# Patient Record
Sex: FEMALE | Race: OTHER | NOT HISPANIC OR LATINO | Employment: OTHER | URBAN - METROPOLITAN AREA
[De-identification: names, ages, dates, MRNs, and addresses within clinical notes are randomized per-mention and may not be internally consistent; named-entity substitution may affect disease eponyms.]

---

## 2022-07-18 ENCOUNTER — OFFICE VISIT (OUTPATIENT)
Dept: URGENT CARE | Facility: CLINIC | Age: 67
End: 2022-07-18
Payer: MEDICARE

## 2022-07-18 VITALS
HEIGHT: 64 IN | SYSTOLIC BLOOD PRESSURE: 142 MMHG | BODY MASS INDEX: 21.34 KG/M2 | HEART RATE: 101 BPM | OXYGEN SATURATION: 98 % | WEIGHT: 125 LBS | RESPIRATION RATE: 18 BRPM | DIASTOLIC BLOOD PRESSURE: 90 MMHG | TEMPERATURE: 99 F

## 2022-07-18 DIAGNOSIS — M25.572 ACUTE LEFT ANKLE PAIN: ICD-10-CM

## 2022-07-18 DIAGNOSIS — M79.672 LEFT FOOT PAIN: Primary | ICD-10-CM

## 2022-07-18 PROCEDURE — 73630 XR FOOT COMPLETE 3 VIEW LEFT: ICD-10-PCS | Mod: LT,S$GLB,, | Performed by: RADIOLOGY

## 2022-07-18 PROCEDURE — 73610 X-RAY EXAM OF ANKLE: CPT | Mod: LT,S$GLB,, | Performed by: RADIOLOGY

## 2022-07-18 PROCEDURE — 73610 XR ANKLE COMPLETE 3 VIEW LEFT: ICD-10-PCS | Mod: LT,S$GLB,, | Performed by: RADIOLOGY

## 2022-07-18 PROCEDURE — 99204 PR OFFICE/OUTPT VISIT, NEW, LEVL IV, 45-59 MIN: ICD-10-PCS | Mod: S$GLB,,, | Performed by: NURSE PRACTITIONER

## 2022-07-18 PROCEDURE — 73630 X-RAY EXAM OF FOOT: CPT | Mod: LT,S$GLB,, | Performed by: RADIOLOGY

## 2022-07-18 PROCEDURE — 99204 OFFICE O/P NEW MOD 45 MIN: CPT | Mod: S$GLB,,, | Performed by: NURSE PRACTITIONER

## 2022-07-18 NOTE — PROGRESS NOTES
"Subjective:       Patient ID: Radha Vu is a 67 y.o. female.    Vitals:  height is 5' 4" (1.626 m) and weight is 56.7 kg (125 lb). Her oral temperature is 98.9 °F (37.2 °C). Her blood pressure is 142/90 (abnormal) and her pulse is 101. Her respiration is 18 and oxygen saturation is 98%.     Chief Complaint: Foot Injury    Pt stomped left foot down on Saturday, she instantly felt sharp , hot, shooting pain. Left ankle is swollen and bruising is present under foot. Pain radiates up the front of shin just below the knee. Pt's  states she has dementia.    Foot Injury   The incident occurred 2 days ago. The incident occurred at home. The injury mechanism was a direct blow. The pain is present in the left ankle and left foot. The quality of the pain is described as burning and shooting. The pain is at a severity of 8/10. Associated symptoms include an inability to bear weight. She has tried ice for the symptoms.       Musculoskeletal: Positive for pain.         Objective:      Physical Exam   HENT:   Head: Normocephalic and atraumatic.   Ears:   Right Ear: External ear normal.   Left Ear: External ear normal.   Pulmonary/Chest: Effort normal. No respiratory distress.   Abdominal: Normal appearance.   Musculoskeletal:         General: Swelling, tenderness and signs of injury present.      Right ankle: Normal. Achilles tendon normal.      Left ankle: She exhibits decreased range of motion. Tenderness. Lateral malleolus and medial malleolus tenderness found.      Comments: Plantar bruising and tenderness    Neurological: She is alert.   Skin: Skin is warm.   Nursing note and vitals reviewed.      XR ANKLE COMPLETE 3 VIEW LEFT    Result Date: 7/18/2022  EXAMINATION: XR ANKLE COMPLETE 3 VIEW LEFT CLINICAL HISTORY: Pain in left ankle and joints of left foot TECHNIQUE: AP, lateral and oblique views of the left ankle were performed. COMPARISON: None FINDINGS: An acute fracture of the tibia, fibula or talus is not " seen.  Soft tissue swelling is not noted.  The ankle mortise is intact.  Mild bony irregularity is noted at the inferior tip of the left lateral malleolus.  This may be congenital, an accessory ossicle, or secondary to prior avulsion fracture or degenerative changes.     Osseous structure inferior to the lateral malleolus may be and accessory ossicle, congenital or secondary to degenerative changes or possible a prior avulsion fracture.  An acute fracture or soft tissue swelling is not seen. Electronically signed by: Miles Hong MD Date:    07/18/2022 Time:    16:42    XR FOOT COMPLETE 3 VIEW LEFT    Result Date: 7/18/2022  EXAMINATION: XR FOOT COMPLETE 3 VIEW LEFT CLINICAL HISTORY: .  Pain in left foot TECHNIQUE: AP, lateral and oblique views of the left foot were performed. COMPARISON: None FINDINGS: There is bunion formation with metatarsus varus and hallux valgus.  A fracture is not identified.  Soft tissue swelling or foreign bodies are not seen.     Bunion formation with metatarsus varus and hallux valgus. Electronically signed by: Miles Hong MD Date:    07/18/2022 Time:    16:40  Assessment:       1. Left foot pain    2. Acute left ankle pain          Plan:     Posterior short leg with Stirrup    applied to left lower extremity. Pulses, circulation and sensation intact.  Extremity pink and warm  Pt referred to Podiatry and splinted until they can rule out LiscFranc injury or other ligament/tendon injury . Xray WNL.    Left foot pain  -     XR FOOT COMPLETE 3 VIEW LEFT; Future; Expected date: 07/18/2022  -     Splint application; Future  -     Ambulatory referral/consult to Podiatry    Acute left ankle pain  -     XR ANKLE COMPLETE 3 VIEW LEFT; Future; Expected date: 07/18/2022                 Patient Instructions                                Orthopedic Follow up Discharge Instructions    If your condition worsens or fails to improve we recommend that you receive another evaluation at the ER  immediately or contact your PCP to discuss your concerns or return here. You must understand that you've received an urgent care treatment only and that you may be released before all your medical problems are known or treated. You the patient will arrange for follouwp care as instructed.   If you were prescribed a narcotic or muscle relaxant do not drive or operate heavy machinery while taking these medications   Tylenol or ibuprofen can also be used as directed for pain unless you have an allergy to them or medical condition such as stomach ulcers, kidney or liver disease or blood thinners etc for which you should not be taking these type of medications.   If you were given a prescription NSAID here do not also take any over the counter NSAID like ibuprofen, aleve, advil, motrin etc   RICE which means rest, ice compression and elevation are helpful.   If you have Low Back Pain and develop bowel or bladder symptoms or increase pain going down your legs go to the ER immediately.   If you were given a splint wear it at all times.   If you were given crutches use them as we instructed. Do not rest your armpits on the foam pad or you risk compressing the nerves and the vessels there   Follow up with the orthopedist in 1 week if you continue with pain.   Sometimes it can take up to 1 week for stress fractures to show up on an X-ray, and may need reimaging or a CT or MRI of the affected area.

## 2022-07-20 ENCOUNTER — OFFICE VISIT (OUTPATIENT)
Dept: PODIATRY | Facility: CLINIC | Age: 67
End: 2022-07-20
Payer: MEDICARE

## 2022-07-20 VITALS — OXYGEN SATURATION: 98 % | HEART RATE: 91 BPM | HEIGHT: 64 IN | WEIGHT: 125 LBS | BODY MASS INDEX: 21.34 KG/M2

## 2022-07-20 DIAGNOSIS — M25.572 ACUTE LEFT ANKLE PAIN: ICD-10-CM

## 2022-07-20 DIAGNOSIS — S99.912A INJURY OF LEFT ANKLE, INITIAL ENCOUNTER: ICD-10-CM

## 2022-07-20 DIAGNOSIS — M25.472 LEFT ANKLE SWELLING: ICD-10-CM

## 2022-07-20 DIAGNOSIS — S96.912A TENDON TEAR, ANKLE, LEFT, INITIAL ENCOUNTER: Primary | ICD-10-CM

## 2022-07-20 DIAGNOSIS — M79.672 LEFT FOOT PAIN: ICD-10-CM

## 2022-07-20 DIAGNOSIS — T14.8XXA LIGAMENT TEAR: ICD-10-CM

## 2022-07-20 DIAGNOSIS — S93.402A SPRAIN OF LEFT ANKLE, UNSPECIFIED LIGAMENT, INITIAL ENCOUNTER: ICD-10-CM

## 2022-07-20 PROCEDURE — 99203 PR OFFICE/OUTPT VISIT, NEW, LEVL III, 30-44 MIN: ICD-10-PCS | Mod: S$GLB,,, | Performed by: PODIATRIST

## 2022-07-20 PROCEDURE — 99203 OFFICE O/P NEW LOW 30 MIN: CPT | Mod: S$GLB,,, | Performed by: PODIATRIST
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
## 2022-07-31 ENCOUNTER — HOSPITAL ENCOUNTER (OUTPATIENT)
Dept: RADIOLOGY | Facility: HOSPITAL | Age: 67
Discharge: HOME | End: 2022-07-31
Attending: PODIATRIST
Payer: MEDICARE

## 2022-07-31 DIAGNOSIS — S96.912A TENDON TEAR, ANKLE, LEFT, INITIAL ENCOUNTER: ICD-10-CM

## 2022-07-31 DIAGNOSIS — T14.8XXA LIGAMENT TEAR: ICD-10-CM

## 2022-07-31 PROCEDURE — 73721 MRI JNT OF LWR EXTRE W/O DYE: CPT | Mod: TC,LT

## 2022-07-31 NOTE — PROGRESS NOTES
MRI of the Left Ankle was done. Patient injured ankle several months ago. Increasing pain and limited range of motion. Hurts to bear weight on it.

## 2022-08-02 DIAGNOSIS — S92.045A OTHER CLOSED NONDISPLACED FRACTURE OF TUBEROSITY OF LEFT CALCANEUS, INITIAL ENCOUNTER: Primary | ICD-10-CM

## 2022-08-03 ENCOUNTER — OFFICE VISIT (OUTPATIENT)
Dept: ORTHOPEDICS | Facility: CLINIC | Age: 67
End: 2022-08-03
Payer: MEDICARE

## 2022-08-03 ENCOUNTER — TELEPHONE (OUTPATIENT)
Dept: ORTHOPEDICS | Facility: CLINIC | Age: 67
End: 2022-08-03
Payer: MEDICARE

## 2022-08-03 VITALS — HEIGHT: 64 IN | WEIGHT: 125 LBS | BODY MASS INDEX: 21.34 KG/M2

## 2022-08-03 DIAGNOSIS — S92.045A OTHER CLOSED NONDISPLACED FRACTURE OF TUBEROSITY OF LEFT CALCANEUS, INITIAL ENCOUNTER: ICD-10-CM

## 2022-08-03 PROCEDURE — 99203 PR OFFICE/OUTPT VISIT, NEW, LEVL III, 30-44 MIN: ICD-10-PCS | Mod: S$PBB,,, | Performed by: ORTHOPAEDIC SURGERY

## 2022-08-03 PROCEDURE — 99203 OFFICE O/P NEW LOW 30 MIN: CPT | Mod: S$PBB,,, | Performed by: ORTHOPAEDIC SURGERY

## 2022-08-03 PROCEDURE — 99213 OFFICE O/P EST LOW 20 MIN: CPT | Mod: PBBFAC,PN | Performed by: ORTHOPAEDIC SURGERY

## 2022-08-03 PROCEDURE — 99999 PR PBB SHADOW E&M-EST. PATIENT-LVL III: CPT | Mod: PBBFAC,,, | Performed by: ORTHOPAEDIC SURGERY

## 2022-08-03 PROCEDURE — 99999 PR PBB SHADOW E&M-EST. PATIENT-LVL III: ICD-10-PCS | Mod: PBBFAC,,, | Performed by: ORTHOPAEDIC SURGERY

## 2022-08-03 RX ORDER — HYDROCODONE BITARTRATE AND ACETAMINOPHEN 5; 325 MG/1; MG/1
1 TABLET ORAL EVERY 8 HOURS PRN
Qty: 30 TABLET | Refills: 0 | Status: SHIPPED | OUTPATIENT
Start: 2022-08-03 | End: 2023-01-20
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
## 2022-08-03 NOTE — TELEPHONE ENCOUNTER
----- Message from Dyllan Martins MA sent at 8/2/2022  2:44 PM CDT -----  Contact: [facility]  Please note referral in system from Dr. Green for S92.045A (ICD-10-CM) - Other closed nondisplaced fracture of tuberosity of left calcaneus, initial encounter.    Please call patient to schedule.    Thanks,  Dyllan

## 2022-08-05 ENCOUNTER — HOSPITAL ENCOUNTER (EMERGENCY)
Facility: HOSPITAL | Age: 67
Discharge: PSYCHIATRIC OR SUBSTANCE USE FACILITY | End: 2022-08-06
Attending: EMERGENCY MEDICINE
Payer: MEDICARE

## 2022-08-05 DIAGNOSIS — F03.91 DEMENTIA WITH BEHAVIORAL DISTURBANCE, UNSPECIFIED DEMENTIA TYPE: Primary | ICD-10-CM

## 2022-08-05 LAB
ALBUMIN SERPL BCP-MCNC: 4.4 G/DL (ref 3.5–5.2)
ALP SERPL-CCNC: 138 U/L (ref 55–135)
ALT SERPL W/O P-5'-P-CCNC: 25 U/L (ref 10–44)
AMPHET+METHAMPHET UR QL: NEGATIVE
ANION GAP SERPL CALC-SCNC: 16 MMOL/L (ref 8–16)
APAP SERPL-MCNC: <3 UG/ML (ref 10–20)
AST SERPL-CCNC: 22 U/L (ref 10–40)
BACTERIA #/AREA URNS HPF: ABNORMAL /HPF
BARBITURATES UR QL SCN>200 NG/ML: NEGATIVE
BASOPHILS # BLD AUTO: 0.03 K/UL (ref 0–0.2)
BASOPHILS NFR BLD: 0.2 % (ref 0–1.9)
BENZODIAZ UR QL SCN>200 NG/ML: NEGATIVE
BILIRUB SERPL-MCNC: 0.7 MG/DL (ref 0.1–1)
BILIRUB UR QL STRIP: NEGATIVE
BUN SERPL-MCNC: 14 MG/DL (ref 8–23)
BZE UR QL SCN: NEGATIVE
CALCIUM SERPL-MCNC: 10.9 MG/DL (ref 8.7–10.5)
CANNABINOIDS UR QL SCN: ABNORMAL
CHLORIDE SERPL-SCNC: 104 MMOL/L (ref 95–110)
CLARITY UR: CLEAR
CO2 SERPL-SCNC: 21 MMOL/L (ref 23–29)
COLOR UR: YELLOW
CREAT SERPL-MCNC: 1.2 MG/DL (ref 0.5–1.4)
CREAT UR-MCNC: 109.8 MG/DL (ref 15–325)
DIFFERENTIAL METHOD: ABNORMAL
EOSINOPHIL # BLD AUTO: 0 K/UL (ref 0–0.5)
EOSINOPHIL NFR BLD: 0 % (ref 0–8)
ERYTHROCYTE [DISTWIDTH] IN BLOOD BY AUTOMATED COUNT: 13.8 % (ref 11.5–14.5)
EST. GFR  (NO RACE VARIABLE): 50 ML/MIN/1.73 M^2
ETHANOL SERPL-MCNC: <10 MG/DL
GLUCOSE SERPL-MCNC: 109 MG/DL (ref 70–110)
GLUCOSE UR QL STRIP: NEGATIVE
HCT VFR BLD AUTO: 39.4 % (ref 37–48.5)
HGB BLD-MCNC: 13.6 G/DL (ref 12–16)
HGB UR QL STRIP: ABNORMAL
HYALINE CASTS #/AREA URNS LPF: 6 /LPF
IMM GRANULOCYTES # BLD AUTO: 0.05 K/UL (ref 0–0.04)
IMM GRANULOCYTES NFR BLD AUTO: 0.3 % (ref 0–0.5)
KETONES UR QL STRIP: ABNORMAL
LEUKOCYTE ESTERASE UR QL STRIP: NEGATIVE
LYMPHOCYTES # BLD AUTO: 0.9 K/UL (ref 1–4.8)
LYMPHOCYTES NFR BLD: 5.6 % (ref 18–48)
MCH RBC QN AUTO: 30.8 PG (ref 27–31)
MCHC RBC AUTO-ENTMCNC: 34.5 G/DL (ref 32–36)
MCV RBC AUTO: 89 FL (ref 82–98)
METHADONE UR QL SCN>300 NG/ML: NEGATIVE
MICROSCOPIC COMMENT: ABNORMAL
MONOCYTES # BLD AUTO: 1.1 K/UL (ref 0.3–1)
MONOCYTES NFR BLD: 6.6 % (ref 4–15)
NEUTROPHILS # BLD AUTO: 14.6 K/UL (ref 1.8–7.7)
NEUTROPHILS NFR BLD: 87.3 % (ref 38–73)
NITRITE UR QL STRIP: NEGATIVE
NRBC BLD-RTO: 0 /100 WBC
OPIATES UR QL SCN: ABNORMAL
PCP UR QL SCN>25 NG/ML: NEGATIVE
PH UR STRIP: 7 [PH] (ref 5–8)
PLATELET # BLD AUTO: 257 K/UL (ref 150–450)
PMV BLD AUTO: 11.5 FL (ref 9.2–12.9)
POTASSIUM SERPL-SCNC: 3.6 MMOL/L (ref 3.5–5.1)
PROT SERPL-MCNC: 8.3 G/DL (ref 6–8.4)
PROT UR QL STRIP: ABNORMAL
RBC # BLD AUTO: 4.42 M/UL (ref 4–5.4)
RBC #/AREA URNS HPF: 0 /HPF (ref 0–4)
SARS-COV-2 RDRP RESP QL NAA+PROBE: NEGATIVE
SODIUM SERPL-SCNC: 141 MMOL/L (ref 136–145)
SP GR UR STRIP: 1.02 (ref 1–1.03)
SQUAMOUS #/AREA URNS HPF: 4 /HPF
TOXICOLOGY INFORMATION: ABNORMAL
TSH SERPL DL<=0.005 MIU/L-ACNC: 0.86 UIU/ML (ref 0.4–4)
URN SPEC COLLECT METH UR: ABNORMAL
UROBILINOGEN UR STRIP-ACNC: NEGATIVE EU/DL
WBC # BLD AUTO: 16.72 K/UL (ref 3.9–12.7)
WBC #/AREA URNS HPF: 5 /HPF (ref 0–5)

## 2022-08-05 PROCEDURE — 85025 COMPLETE CBC W/AUTO DIFF WBC: CPT | Performed by: EMERGENCY MEDICINE

## 2022-08-05 PROCEDURE — 80307 DRUG TEST PRSMV CHEM ANLYZR: CPT | Performed by: EMERGENCY MEDICINE

## 2022-08-05 PROCEDURE — 80143 DRUG ASSAY ACETAMINOPHEN: CPT | Performed by: EMERGENCY MEDICINE

## 2022-08-05 PROCEDURE — G0425 INPT/ED TELECONSULT30: HCPCS | Mod: 95,,, | Performed by: PSYCHIATRY & NEUROLOGY

## 2022-08-05 PROCEDURE — 84443 ASSAY THYROID STIM HORMONE: CPT | Performed by: EMERGENCY MEDICINE

## 2022-08-05 PROCEDURE — 80053 COMPREHEN METABOLIC PANEL: CPT | Performed by: EMERGENCY MEDICINE

## 2022-08-05 PROCEDURE — G0425 PR INPT TELEHEALTH CONSULT 30M: ICD-10-PCS | Mod: 95,,, | Performed by: PSYCHIATRY & NEUROLOGY

## 2022-08-05 PROCEDURE — 99285 EMERGENCY DEPT VISIT HI MDM: CPT | Mod: 25

## 2022-08-05 PROCEDURE — 82077 ASSAY SPEC XCP UR&BREATH IA: CPT | Performed by: EMERGENCY MEDICINE

## 2022-08-05 PROCEDURE — U0002 COVID-19 LAB TEST NON-CDC: HCPCS | Performed by: EMERGENCY MEDICINE

## 2022-08-05 PROCEDURE — 36415 COLL VENOUS BLD VENIPUNCTURE: CPT | Performed by: EMERGENCY MEDICINE

## 2022-08-05 PROCEDURE — 86803 HEPATITIS C AB TEST: CPT | Performed by: EMERGENCY MEDICINE

## 2022-08-05 PROCEDURE — 81000 URINALYSIS NONAUTO W/SCOPE: CPT | Mod: 59 | Performed by: EMERGENCY MEDICINE

## 2022-08-05 PROCEDURE — 25000003 PHARM REV CODE 250: Performed by: EMERGENCY MEDICINE

## 2022-08-05 RX ORDER — ACETAMINOPHEN 500 MG
1000 TABLET ORAL
Status: COMPLETED | OUTPATIENT
Start: 2022-08-05 | End: 2022-08-05

## 2022-08-05 RX ORDER — CLONIDINE HYDROCHLORIDE 0.1 MG/1
0.2 TABLET ORAL
Status: COMPLETED | OUTPATIENT
Start: 2022-08-05 | End: 2022-08-05

## 2022-08-05 RX ORDER — QUETIAPINE FUMARATE 25 MG/1
25 TABLET, FILM COATED ORAL 2 TIMES DAILY
Status: DISCONTINUED | OUTPATIENT
Start: 2022-08-05 | End: 2022-08-06 | Stop reason: HOSPADM

## 2022-08-05 RX ADMIN — CLONIDINE HYDROCHLORIDE 0.2 MG: 0.1 TABLET ORAL at 03:08

## 2022-08-05 RX ADMIN — QUETIAPINE 25 MG: 25 TABLET ORAL at 07:08

## 2022-08-05 RX ADMIN — ACETAMINOPHEN 1000 MG: 500 TABLET ORAL at 11:08
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
## 2022-08-06 VITALS
HEART RATE: 106 BPM | WEIGHT: 125 LBS | SYSTOLIC BLOOD PRESSURE: 173 MMHG | DIASTOLIC BLOOD PRESSURE: 98 MMHG | BODY MASS INDEX: 21.34 KG/M2 | HEIGHT: 64 IN | OXYGEN SATURATION: 98 % | RESPIRATION RATE: 18 BRPM | TEMPERATURE: 98 F

## 2022-08-08 DIAGNOSIS — R76.8 HEPATITIS C ANTIBODY POSITIVE IN BLOOD: Primary | ICD-10-CM

## 2022-08-08 LAB — HCV AB SERPL QL IA: POSITIVE

## 2022-08-24 DIAGNOSIS — S92.045A OTHER CLOSED NONDISPLACED FRACTURE OF TUBEROSITY OF LEFT CALCANEUS, INITIAL ENCOUNTER: Primary | ICD-10-CM

## 2022-08-31 ENCOUNTER — OFFICE VISIT (OUTPATIENT)
Dept: ORTHOPEDICS | Facility: CLINIC | Age: 67
End: 2022-08-31
Payer: MEDICARE

## 2022-08-31 ENCOUNTER — HOSPITAL ENCOUNTER (OUTPATIENT)
Dept: RADIOLOGY | Facility: HOSPITAL | Age: 67
Discharge: HOME | End: 2022-08-31
Attending: ORTHOPAEDIC SURGERY
Payer: MEDICARE

## 2022-08-31 DIAGNOSIS — S92.015D CLOSED NONDISPLACED FRACTURE OF BODY OF LEFT CALCANEUS WITH ROUTINE HEALING, SUBSEQUENT ENCOUNTER: Primary | ICD-10-CM

## 2022-08-31 DIAGNOSIS — S92.045A OTHER CLOSED NONDISPLACED FRACTURE OF TUBEROSITY OF LEFT CALCANEUS, INITIAL ENCOUNTER: ICD-10-CM

## 2022-08-31 PROCEDURE — 99213 PR OFFICE/OUTPT VISIT, EST, LEVL III, 20-29 MIN: ICD-10-PCS | Mod: S$PBB,,, | Performed by: ORTHOPAEDIC SURGERY

## 2022-08-31 PROCEDURE — 99211 OFF/OP EST MAY X REQ PHY/QHP: CPT | Mod: PBBFAC,PN | Performed by: ORTHOPAEDIC SURGERY

## 2022-08-31 PROCEDURE — 73610 X-RAY EXAM OF ANKLE: CPT | Mod: 26,LT,, | Performed by: RADIOLOGY

## 2022-08-31 PROCEDURE — 73610 X-RAY EXAM OF ANKLE: CPT | Mod: TC,PN,LT

## 2022-08-31 PROCEDURE — 99213 OFFICE O/P EST LOW 20 MIN: CPT | Mod: S$PBB,,, | Performed by: ORTHOPAEDIC SURGERY

## 2022-08-31 PROCEDURE — 99999 PR PBB SHADOW E&M-EST. PATIENT-LVL I: CPT | Mod: PBBFAC,,, | Performed by: ORTHOPAEDIC SURGERY

## 2022-08-31 PROCEDURE — 73610 XR ANKLE COMPLETE 3 VIEW LEFT: ICD-10-PCS | Mod: 26,LT,, | Performed by: RADIOLOGY

## 2022-08-31 PROCEDURE — 99999 PR PBB SHADOW E&M-EST. PATIENT-LVL I: ICD-10-PCS | Mod: PBBFAC,,, | Performed by: ORTHOPAEDIC SURGERY
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
## 2022-10-03 DIAGNOSIS — S92.015D CLOSED NONDISPLACED FRACTURE OF BODY OF LEFT CALCANEUS WITH ROUTINE HEALING, SUBSEQUENT ENCOUNTER: Primary | ICD-10-CM

## 2022-10-05 ENCOUNTER — HOSPITAL ENCOUNTER (OUTPATIENT)
Dept: RADIOLOGY | Facility: HOSPITAL | Age: 67
Discharge: HOME | End: 2022-10-05
Attending: ORTHOPAEDIC SURGERY
Payer: MEDICARE

## 2022-10-05 ENCOUNTER — OFFICE VISIT (OUTPATIENT)
Dept: ORTHOPEDICS | Facility: CLINIC | Age: 67
End: 2022-10-05
Payer: MEDICARE

## 2022-10-05 VITALS — BODY MASS INDEX: 21.34 KG/M2 | WEIGHT: 125 LBS | HEIGHT: 64 IN

## 2022-10-05 DIAGNOSIS — S92.015D CLOSED NONDISPLACED FRACTURE OF BODY OF LEFT CALCANEUS WITH ROUTINE HEALING, SUBSEQUENT ENCOUNTER: Primary | ICD-10-CM

## 2022-10-05 DIAGNOSIS — S92.015D CLOSED NONDISPLACED FRACTURE OF BODY OF LEFT CALCANEUS WITH ROUTINE HEALING, SUBSEQUENT ENCOUNTER: ICD-10-CM

## 2022-10-05 PROCEDURE — 73610 XR ANKLE COMPLETE 3 VIEW LEFT: ICD-10-PCS | Mod: 26,LT,, | Performed by: RADIOLOGY

## 2022-10-05 PROCEDURE — 99999 PR PBB SHADOW E&M-EST. PATIENT-LVL III: CPT | Mod: PBBFAC,,, | Performed by: ORTHOPAEDIC SURGERY

## 2022-10-05 PROCEDURE — 99213 OFFICE O/P EST LOW 20 MIN: CPT | Mod: PBBFAC,PN | Performed by: ORTHOPAEDIC SURGERY

## 2022-10-05 PROCEDURE — 73610 X-RAY EXAM OF ANKLE: CPT | Mod: TC,PN,LT

## 2022-10-05 PROCEDURE — 99213 OFFICE O/P EST LOW 20 MIN: CPT | Mod: S$PBB,,, | Performed by: ORTHOPAEDIC SURGERY

## 2022-10-05 PROCEDURE — 73610 X-RAY EXAM OF ANKLE: CPT | Mod: 26,LT,, | Performed by: RADIOLOGY

## 2022-10-05 PROCEDURE — 99213 PR OFFICE/OUTPT VISIT, EST, LEVL III, 20-29 MIN: ICD-10-PCS | Mod: S$PBB,,, | Performed by: ORTHOPAEDIC SURGERY

## 2022-10-05 PROCEDURE — 99999 PR PBB SHADOW E&M-EST. PATIENT-LVL III: ICD-10-PCS | Mod: PBBFAC,,, | Performed by: ORTHOPAEDIC SURGERY

## 2022-10-05 RX ORDER — GABAPENTIN 100 MG/1
CAPSULE ORAL
COMMUNITY
Start: 2022-08-19 | End: 2023-01-20

## 2022-10-05 RX ORDER — AMLODIPINE BESYLATE 2.5 MG/1
TABLET ORAL
COMMUNITY
Start: 2022-08-19 | End: 2023-01-20

## 2022-10-05 RX ORDER — OLANZAPINE 5 MG/1
TABLET ORAL NIGHTLY
COMMUNITY
Start: 2022-08-19 | End: 2023-04-19 | Stop reason: SDUPTHER

## 2022-10-05 RX ORDER — NYSTATIN 500000 [USP'U]/1
TABLET, COATED ORAL
COMMUNITY
Start: 2022-06-24 | End: 2023-03-21

## 2022-10-05 NOTE — PROGRESS NOTES
10/5/2022    Past Medical History:   Diagnosis Date    Arthritis     Cancer     COPD (chronic obstructive pulmonary disease)     Hypertension     Pulmonary embolism     Respiratory distress     Unspecified viral hepatitis C without hepatic coma        Past Surgical History:   Procedure Laterality Date    HYSTERECTOMY         Current Outpatient Medications   Medication Sig    amLODIPine (NORVASC) 2.5 MG tablet     gabapentin (NEURONTIN) 100 MG capsule     HYDROcodone-acetaminophen (NORCO) 5-325 mg per tablet Take 1 tablet by mouth every 8 (eight) hours as needed for Pain. (Patient not taking: Reported on 10/5/2022)    nystatin (MYCOSTATIN) 500,000 unit Tab Take by mouth.    OLANZapine (ZYPREXA) 5 MG tablet      No current facility-administered medications for this visit.       Review of patient's allergies indicates:   Allergen Reactions    Penicillins     Sulfa (sulfonamide antibiotics)        History reviewed. No pertinent family history.    Social History     Socioeconomic History    Marital status:    Tobacco Use    Smoking status: Every Day     Packs/day: 2.00     Years: 50.00     Pack years: 100.00     Types: Cigarettes    Smokeless tobacco: Never   Substance and Sexual Activity    Alcohol use: Not Currently    Drug use: Never       Chief Complaint:   Chief Complaint   Patient presents with    Left Foot - Follow-up, Fracture     DOI: 07/18/2022 - 3 m 5 d S/P Left Calcaneus FX. She states she still has moderate pain the increases at night. She states she gets a lot of cramping in her foot as well.          History of present illness:    This is a 67 y.o. year old female who complains of patient is now about 3 months status post left calcaneus fracture she states she will still has some moderate pain which increases at night she complains some cramping in her foot is well    Review of Systems:    Constitution: Denies chills, fever, and sweats.  HENT: Denies headaches or blurry vision.  Cardiovascular:  "Denies chest pain or irregular heart beat.  Respiratory: Denies cough or shortness of breath.  Gastrointestinal: Denies abdominal pain, nausea, or vomiting.  Musculoskeletal:  Denies muscle cramps.  Neurological: Denies dizziness or focal weakness.  Psychiatric/Behavioral: Normal mental status.  Hematologic/Lymphatic: Denies bleeding problem or easy bruising/bleeding.  Skin: Denies rash or suspicious lesions.    Examination:    Vital Signs:    Vitals:    10/05/22 1313 10/05/22 1343   Weight: 56.7 kg (125 lb) 56.7 kg (125 lb)   Height: 5' 4" (1.626 m) 5' 4" (1.626 m)   PainSc:    5   PainLoc:  Foot       Body mass index is 21.46 kg/m².    This a well-developed, well nourished patient in no acute distress.    Alert and oriented x 3 and cooperative to examination.       Physical Exam:  Left foot-patient is ambulating full weight-bearing with no evidence of any significant discomfort she has full range of motion of the ankle full subtalar joint motion she has no tenderness to palpation on the plantar aspect of the foot    Imaging:  X-ray of the left ankle show healing of the posterior inferior portion of the calcaneus the ankle joint looks good no evidence of any arthritic changes fractures     Assessment: Closed nondisplaced fracture of body of left calcaneus with routine healing, subsequent encounter      Plan:  The patient is able to fully weightbear she can come out of the walking boot she can take some Motrin over-the-counter as needed she has been instructed on some stretching exercises some warm soaks she can return to her normal shoe wear and return as needed      DISCLAIMER: This note may have been dictated using voice recognition software and may contain grammatical errors.     NOTE: Consult report sent to referring provider via [employer] EMR.      "

## 2023-01-19 DIAGNOSIS — M25.512 ACUTE PAIN OF LEFT SHOULDER: Primary | ICD-10-CM

## 2023-01-20 ENCOUNTER — HOSPITAL ENCOUNTER (OUTPATIENT)
Dept: RADIOLOGY | Facility: HOSPITAL | Age: 68
Discharge: HOME | End: 2023-01-20
Attending: ORTHOPAEDIC SURGERY
Payer: MEDICARE

## 2023-01-20 ENCOUNTER — OFFICE VISIT (OUTPATIENT)
Dept: ORTHOPEDICS | Facility: CLINIC | Age: 68
End: 2023-01-20
Payer: MEDICARE

## 2023-01-20 VITALS — BODY MASS INDEX: 21.34 KG/M2 | HEIGHT: 64 IN | WEIGHT: 125 LBS

## 2023-01-20 DIAGNOSIS — M25.512 ACUTE PAIN OF LEFT SHOULDER: ICD-10-CM

## 2023-01-20 DIAGNOSIS — M54.2 NECK PAIN: Primary | ICD-10-CM

## 2023-01-20 DIAGNOSIS — M54.2 NECK PAIN: ICD-10-CM

## 2023-01-20 DIAGNOSIS — M50.90 CERVICAL DISC DISEASE: Primary | ICD-10-CM

## 2023-01-20 PROCEDURE — 99999 PR PBB SHADOW E&M-EST. PATIENT-LVL III: CPT | Mod: PBBFAC,,, | Performed by: ORTHOPAEDIC SURGERY

## 2023-01-20 PROCEDURE — 99213 PR OFFICE/OUTPT VISIT, EST, LEVL III, 20-29 MIN: ICD-10-PCS | Mod: S$PBB,,, | Performed by: ORTHOPAEDIC SURGERY

## 2023-01-20 PROCEDURE — 99213 OFFICE O/P EST LOW 20 MIN: CPT | Mod: S$PBB,,, | Performed by: ORTHOPAEDIC SURGERY

## 2023-01-20 PROCEDURE — 73030 X-RAY EXAM OF SHOULDER: CPT | Mod: 26,LT,, | Performed by: RADIOLOGY

## 2023-01-20 PROCEDURE — 73030 X-RAY EXAM OF SHOULDER: CPT | Mod: TC,PN,LT

## 2023-01-20 PROCEDURE — 73030 XR SHOULDER COMPLETE 2 OR MORE VIEWS LEFT: ICD-10-PCS | Mod: 26,LT,, | Performed by: RADIOLOGY

## 2023-01-20 PROCEDURE — 72040 XR CERVICAL SPINE AP LATERAL: ICD-10-PCS | Mod: 26,,, | Performed by: RADIOLOGY

## 2023-01-20 PROCEDURE — 72040 X-RAY EXAM NECK SPINE 2-3 VW: CPT | Mod: TC,PN

## 2023-01-20 PROCEDURE — 99999 PR PBB SHADOW E&M-EST. PATIENT-LVL III: ICD-10-PCS | Mod: PBBFAC,,, | Performed by: ORTHOPAEDIC SURGERY

## 2023-01-20 PROCEDURE — 72040 X-RAY EXAM NECK SPINE 2-3 VW: CPT | Mod: 26,,, | Performed by: RADIOLOGY

## 2023-01-20 PROCEDURE — 99213 OFFICE O/P EST LOW 20 MIN: CPT | Mod: PBBFAC,PN | Performed by: ORTHOPAEDIC SURGERY

## 2023-01-20 RX ORDER — HYDROCODONE BITARTRATE AND ACETAMINOPHEN 5; 325 MG/1; MG/1
1 TABLET ORAL EVERY 6 HOURS PRN
Qty: 30 TABLET | Refills: 0 | Status: ON HOLD | OUTPATIENT
Start: 2023-01-20 | End: 2023-03-29 | Stop reason: HOSPADM

## 2023-01-20 RX ORDER — COLESEVELAM 180 1/1
TABLET ORAL 2 TIMES DAILY WITH MEALS
COMMUNITY
Start: 2023-01-18
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
## 2023-01-27 ENCOUNTER — HOSPITAL ENCOUNTER (OUTPATIENT)
Dept: RADIOLOGY | Facility: HOSPITAL | Age: 68
Discharge: HOME | End: 2023-01-27
Attending: ORTHOPAEDIC SURGERY
Payer: MEDICARE

## 2023-01-27 DIAGNOSIS — M50.90 CERVICAL DISC DISEASE: ICD-10-CM

## 2023-01-27 PROCEDURE — 72141 MRI NECK SPINE W/O DYE: CPT | Mod: TC,PO

## 2023-01-31 ENCOUNTER — OFFICE VISIT (OUTPATIENT)
Dept: ORTHOPEDICS | Facility: CLINIC | Age: 68
End: 2023-01-31
Payer: MEDICARE

## 2023-01-31 VITALS — HEIGHT: 64 IN | BODY MASS INDEX: 21.34 KG/M2 | WEIGHT: 125 LBS

## 2023-01-31 DIAGNOSIS — M50.90 CERVICAL DISC DISEASE: Primary | ICD-10-CM

## 2023-01-31 PROCEDURE — 99999 PR PBB SHADOW E&M-EST. PATIENT-LVL III: ICD-10-PCS | Mod: PBBFAC,,, | Performed by: ORTHOPAEDIC SURGERY

## 2023-01-31 PROCEDURE — 99213 OFFICE O/P EST LOW 20 MIN: CPT | Mod: S$PBB,,, | Performed by: ORTHOPAEDIC SURGERY

## 2023-01-31 PROCEDURE — 99213 PR OFFICE/OUTPT VISIT, EST, LEVL III, 20-29 MIN: ICD-10-PCS | Mod: S$PBB,,, | Performed by: ORTHOPAEDIC SURGERY

## 2023-01-31 PROCEDURE — 99999 PR PBB SHADOW E&M-EST. PATIENT-LVL III: CPT | Mod: PBBFAC,,, | Performed by: ORTHOPAEDIC SURGERY

## 2023-01-31 PROCEDURE — 99213 OFFICE O/P EST LOW 20 MIN: CPT | Mod: PBBFAC,PN | Performed by: ORTHOPAEDIC SURGERY

## 2023-01-31 RX ORDER — METHYLPREDNISOLONE 4 MG/1
TABLET ORAL
Qty: 1 EACH | Refills: 0 | Status: SHIPPED | OUTPATIENT
Start: 2023-01-31 | End: 2023-03-15

## 2023-01-31 RX ORDER — HYDROCODONE BITARTRATE AND ACETAMINOPHEN 5; 325 MG/1; MG/1
1 TABLET ORAL EVERY 6 HOURS PRN
Qty: 30 TABLET | Refills: 0 | Status: ON HOLD | OUTPATIENT
Start: 2023-01-31 | End: 2023-03-29 | Stop reason: HOSPADM

## 2023-01-31 NOTE — PROGRESS NOTES
1/31/2023    Past Medical History:   Diagnosis Date    Arthritis     Cancer     COPD (chronic obstructive pulmonary disease)     Hypertension     Pulmonary embolism     Respiratory distress     Unspecified viral hepatitis C without hepatic coma        Past Surgical History:   Procedure Laterality Date    HYSTERECTOMY         Current Outpatient Medications   Medication Sig    colesevelam (WELCHOL) 625 mg tablet Take by mouth.    HYDROcodone-acetaminophen (NORCO) 5-325 mg per tablet Take 1 tablet by mouth every 6 (six) hours as needed for Pain.    nystatin (MYCOSTATIN) 500,000 unit Tab Take by mouth.    OLANZapine (ZYPREXA) 5 MG tablet      No current facility-administered medications for this visit.       Review of patient's allergies indicates:   Allergen Reactions    Penicillins     Sulfa (sulfonamide antibiotics)        History reviewed. No pertinent family history.    Social History     Socioeconomic History    Marital status:    Tobacco Use    Smoking status: Every Day     Packs/day: 2.00     Years: 50.00     Pack years: 100.00     Types: Cigarettes    Smokeless tobacco: Never   Substance and Sexual Activity    Alcohol use: Not Currently    Drug use: Never       Chief Complaint:   Chief Complaint   Patient presents with    Spine - Results, Follow-up     MRI Review of C-spine. PL 8/10 today. Reports no improvement in symptoms. States pain has actually progressed since her last office visit.          History of present illness:    This is a 67 y.o. year old female who complains of patient is following up today for her neck pain her pain level is 8/10 no improvement of symptoms    Review of Systems:    Constitution: Denies chills, fever, and sweats.  HENT: Denies headaches or blurry vision.  Cardiovascular: Denies chest pain or irregular heart beat.  Respiratory: Denies cough or shortness of breath.  Gastrointestinal: Denies abdominal pain, nausea, or vomiting.  Musculoskeletal:  Denies muscle  "cramps.  Neurological: Denies dizziness or focal weakness.  Psychiatric/Behavioral: Normal mental status.  Hematologic/Lymphatic: Denies bleeding problem or easy bruising/bleeding.  Skin: Denies rash or suspicious lesions.    Examination:    Vital Signs:    Vitals:    01/31/23 1459   Weight: 56.7 kg (125 lb)   Height: 5' 4" (1.626 m)   PainSc:   8   PainLoc: Neck       Body mass index is 21.46 kg/m².    This a well-developed, well nourished patient in no acute distress.    Alert and oriented x 3 and cooperative to examination.       Physical Exam:  Neck-patient has restricted range of motion of the neck has pain radiating into trapezial area and into the left arm some weakness on extension of the elbow    Imaging:  Patient had an MRI of her cervical spine the MRI showed moderate facet arthritic changes at the C5-C6 area resulting in moderate stenosis bilaterally possibly involving the C6 nerve root this appeared to be the worst involved level       Assessment: Cervical disc disease        Plan:  We will refer this patient to Dr. Heller for neurosurgical evaluation I am going to put her on a Medrol Dosepak and also refill her Norco      DISCLAIMER: This note may have been dictated using voice recognition software and may contain grammatical errors.     NOTE: Consult report sent to referring provider via [employer] EMR.    "

## 2023-03-06 ENCOUNTER — OFFICE VISIT (OUTPATIENT)
Dept: NEUROSURGERY | Facility: CLINIC | Age: 68
End: 2023-03-06
Payer: MEDICARE

## 2023-03-06 VITALS
HEIGHT: 64 IN | SYSTOLIC BLOOD PRESSURE: 155 MMHG | BODY MASS INDEX: 20.65 KG/M2 | HEART RATE: 105 BPM | DIASTOLIC BLOOD PRESSURE: 91 MMHG | WEIGHT: 120.94 LBS

## 2023-03-06 DIAGNOSIS — G99.2 STENOSIS OF CERVICAL SPINE WITH MYELOPATHY: ICD-10-CM

## 2023-03-06 DIAGNOSIS — M50.20 CERVICAL DISC DISPLACEMENT: Primary | ICD-10-CM

## 2023-03-06 DIAGNOSIS — M50.30 DDD (DEGENERATIVE DISC DISEASE), CERVICAL: ICD-10-CM

## 2023-03-06 DIAGNOSIS — M48.02 STENOSIS OF CERVICAL SPINE WITH MYELOPATHY: ICD-10-CM

## 2023-03-06 DIAGNOSIS — M50.90 CERVICAL DISC DISEASE: ICD-10-CM

## 2023-03-06 PROCEDURE — 99205 OFFICE O/P NEW HI 60 MIN: CPT | Mod: S$GLB,,, | Performed by: NEUROLOGICAL SURGERY

## 2023-03-06 PROCEDURE — 99205 PR OFFICE/OUTPT VISIT, NEW, LEVL V, 60-74 MIN: ICD-10-PCS | Mod: S$GLB,,, | Performed by: NEUROLOGICAL SURGERY

## 2023-03-06 RX ORDER — HYDROCODONE BITARTRATE AND ACETAMINOPHEN 5; 325 MG/1; MG/1
1 TABLET ORAL EVERY 8 HOURS PRN
Qty: 21 TABLET | Refills: 0 | Status: ON HOLD | OUTPATIENT
Start: 2023-03-06 | End: 2023-03-29 | Stop reason: HOSPADM

## 2023-03-06 NOTE — H&P (VIEW-ONLY)
I appreciate this referral from Dr. Fernandez    HPI:  This is a very pleasant 67-year-old woman presents with progressive, severe neck pain with radiation into the left arm, forearm and hand.  She describes burning, stabbing pain in the left C6 dermatome.  She endorses dropping objects with both hands.  She reports imbalance and notesa fall downstairs approximately 2 months ago.  She states she struck her head and had multiple teeth knocked out.  She denies loss of consciousness.  Her neck and left arm symptoms began shortly after the fall.  She notes a history of neck pain has undergone previous STELLA.  She reports that the most recent STELLA was not helpful.  In addition to the above, she also reports urinary urgency and wears diapers daily.    She is accompanied by her  who reports that she has mild dementia.  She is a retired .    Her  reports she has a history of rheumatoid arthritis and lupus.  They recently relocated to [place].  She has no PCP.  They live on a boat.    She notes that she will likely require extensive dental work    Smoking status:  Active.  Currently smokes 1 and half packs per day.  Notes previous 2-1/2-3 packs daily.  One hundred pack years  Past Medical History:   Diagnosis Date    Arthritis     Cancer     COPD (chronic obstructive pulmonary disease)     Hypertension     Pulmonary embolism     Respiratory distress     Unspecified viral hepatitis C without hepatic coma       Past Surgical History:   Procedure Laterality Date    HYSTERECTOMY       Social History     Tobacco Use    Smoking status: Every Day     Packs/day: 2.00     Years: 50.00     Pack years: 100.00     Types: Cigarettes    Smokeless tobacco: Never   Substance Use Topics    Alcohol use: Not Currently    Drug use: Never       Review of Systems: All systems reviewed and are as above or otherwise negative.    General: well developed, well nourished, no distress.   Head: normocephalic, atraumatic  Eyes:  pupils equal, round, reactive to light with accomodation, EOMI.   Neck: trachea midline. No JVD  Cardiovascular: No LE edema   Pulmonary: normal respirations, no signs of respiratory distress  Abdomen: soft, non-distended, not tender to palpation  Sensory: intact to light touch throughout  Extremities: No bruising, deformity  Skin: Skin is warm, dry and intact.    Motor Strength: Moves all extremities spontaneously with good tone.  Full strength upper and lower extremities. No abnormal movements seen.     Strength  Deltoids Triceps Biceps Wrist Extension Wrist Flexion Hand    Upper: R 5/5 5/5 5/5 5/5 5/5 5/5    L 5/5 5/5 5/5 5/5 5/5 5/5     Iliopsoas Quadriceps Knee  Flexion Tibialis  anterior Gastro- cnemius EHL   Lower: R 5/5 5/5 5/5 5/5 5/5 5/5    L 5/5 5/5 5/5 5/5 5/5 5/5     Neurologic: Alert and oriented. Thought content appropriate.  GCS: Motor: 6/Verbal: 5/Eyes: 4 GCS Total: 15  Mental Status: Awake, Alert, Oriented x 4  Language: No aphasia  Speech: No dysarthria  Cranial nerves: face symmetric, tongue midline, CN II-XII grossly intact.     Pronator Drift: no drift noted  Finger-to-nose: Intact bilaterally  DTR's: 2 + and symmetric in UE and LE  Cummins: absent  Clonus: absent  Babinski: absent    Gait: normal    Tandem Gait: No difficulty           Able to walk on heels & toes    Cervical Spine  ROM: full    Nontender to palpation    Lumbar Spine   ROM: full   Nontender to palpation    Pain on Hip ROM: Negative  Straight leg raise: negative bilaterally     SI Joint tenderness: Negative bilaterally   FRANCI: Negative bilaterally  Thigh Thrust: Negative bilaterally  Gaenslen: Negative bilaterally    Significant Exam Findings:  Left  graded 4/5, left triceps graded 4/5.  Huy sign present right.  Hyperreflexic in all extremities.  Tandem walking normal.    Significant Radiology Findings:  I reviewed MRI cervical spine without contrast in detail with the patient and her .  Date of the study  01/27/2023.  Pertinent findings include advanced degenerative disc disease C5-6 C6-7.  Retrolisthesis C5 on C6.  Severe spinal canal and foraminal stenosis bilaterally at C5-6.  On my review there is at least moderate spinal canal and bilateral foraminal stenosis at C6-7.    Analysis:  Based on the above clinical and radiographic findings, I recommended ACDF C5-6 C6-7.  I outlined the goals of surgery, material risks, and treatment alternatives.  I explained that her heavy smoking increases her risk for nonunion or other postoperative complications.  I explained that an updated bone density DEXA would be indicated to exclude significant osteoporosis.  She voiced understanding and wishes to proceed with surgery.  Her  also voiced understanding and concurs with surgery.  I provided her with a rigid cervical orthosis and instructed her to wear when ambulating or riding in a car.  I ordered a DEXA bone density, CT cervical spine, and cervical x-rays with flexion-extension views for preoperative planning.  I also recommended she establish care with a local primary care physician; referral made today.  I provided her with a refill of Norco 5/325 for symptom management until surgery.    [unfilled]   Nichelle was seen today for neck pain.    Diagnoses and all orders for this visit:    Cervical disc displacement  -     HYDROcodone-acetaminophen (NORCO) 5-325 mg per tablet; Take 1 tablet by mouth every 8 (eight) hours as needed for Pain.    Cervical disc disease  -     Ambulatory referral/consult to Neurosurgery  -     HYDROcodone-acetaminophen (NORCO) 5-325 mg per tablet; Take 1 tablet by mouth every 8 (eight) hours as needed for Pain.    DDD (degenerative disc disease), cervical  -     HYDROcodone-acetaminophen (NORCO) 5-325 mg per tablet; Take 1 tablet by mouth every 8 (eight) hours as needed for Pain.    Stenosis of cervical spine with myelopathy  -     HYDROcodone-acetaminophen (NORCO) 5-325 mg per tablet; Take 1  tablet by mouth every 8 (eight) hours as needed for Pain.         No follow-ups on file.     I spent a total of 60 minutes on the day of the visit.  This includes face to face time and non-face to face time preparing to see the patient (eg, review of tests), obtaining and/or reviewing separately obtained history, documenting clinical information in the electronic or other health record, independently interpreting results and communicating results to the patient/family/caregiver, or care coordinator.

## 2023-03-06 NOTE — PROGRESS NOTES
I appreciate this referral from Dr. Fernandez    HPI:  This is a very pleasant 67-year-old woman presents with progressive, severe neck pain with radiation into the left arm, forearm and hand.  She describes burning, stabbing pain in the left C6 dermatome.  She endorses dropping objects with both hands.  She reports imbalance and notesa fall downstairs approximately 2 months ago.  She states she struck her head and had multiple teeth knocked out.  She denies loss of consciousness.  Her neck and left arm symptoms began shortly after the fall.  She notes a history of neck pain has undergone previous STELLA.  She reports that the most recent STELLA was not helpful.  In addition to the above, she also reports urinary urgency and wears diapers daily.    She is accompanied by her  who reports that she has mild dementia.  She is a retired .    Her  reports she has a history of rheumatoid arthritis and lupus.  They recently relocated to [place].  She has no PCP.  They live on a boat.    She notes that she will likely require extensive dental work    Smoking status:  Active.  Currently smokes 1 and half packs per day.  Notes previous 2-1/2-3 packs daily.  One hundred pack years  Past Medical History:   Diagnosis Date    Arthritis     Cancer     COPD (chronic obstructive pulmonary disease)     Hypertension     Pulmonary embolism     Respiratory distress     Unspecified viral hepatitis C without hepatic coma       Past Surgical History:   Procedure Laterality Date    HYSTERECTOMY       Social History     Tobacco Use    Smoking status: Every Day     Packs/day: 2.00     Years: 50.00     Pack years: 100.00     Types: Cigarettes    Smokeless tobacco: Never   Substance Use Topics    Alcohol use: Not Currently    Drug use: Never       Review of Systems: All systems reviewed and are as above or otherwise negative.    General: well developed, well nourished, no distress.   Head: normocephalic, atraumatic  Eyes:  pupils equal, round, reactive to light with accomodation, EOMI.   Neck: trachea midline. No JVD  Cardiovascular: No LE edema   Pulmonary: normal respirations, no signs of respiratory distress  Abdomen: soft, non-distended, not tender to palpation  Sensory: intact to light touch throughout  Extremities: No bruising, deformity  Skin: Skin is warm, dry and intact.    Motor Strength: Moves all extremities spontaneously with good tone.  Full strength upper and lower extremities. No abnormal movements seen.     Strength  Deltoids Triceps Biceps Wrist Extension Wrist Flexion Hand    Upper: R 5/5 5/5 5/5 5/5 5/5 5/5    L 5/5 5/5 5/5 5/5 5/5 5/5     Iliopsoas Quadriceps Knee  Flexion Tibialis  anterior Gastro- cnemius EHL   Lower: R 5/5 5/5 5/5 5/5 5/5 5/5    L 5/5 5/5 5/5 5/5 5/5 5/5     Neurologic: Alert and oriented. Thought content appropriate.  GCS: Motor: 6/Verbal: 5/Eyes: 4 GCS Total: 15  Mental Status: Awake, Alert, Oriented x 4  Language: No aphasia  Speech: No dysarthria  Cranial nerves: face symmetric, tongue midline, CN II-XII grossly intact.     Pronator Drift: no drift noted  Finger-to-nose: Intact bilaterally  DTR's: 2 + and symmetric in UE and LE  Cummins: absent  Clonus: absent  Babinski: absent    Gait: normal    Tandem Gait: No difficulty           Able to walk on heels & toes    Cervical Spine  ROM: full    Nontender to palpation    Lumbar Spine   ROM: full   Nontender to palpation    Pain on Hip ROM: Negative  Straight leg raise: negative bilaterally     SI Joint tenderness: Negative bilaterally   FRANCI: Negative bilaterally  Thigh Thrust: Negative bilaterally  Gaenslen: Negative bilaterally    Significant Exam Findings:  Left  graded 4/5, left triceps graded 4/5.  Huy sign present right.  Hyperreflexic in all extremities.  Tandem walking normal.    Significant Radiology Findings:  I reviewed MRI cervical spine without contrast in detail with the patient and her .  Date of the study  01/27/2023.  Pertinent findings include advanced degenerative disc disease C5-6 C6-7.  Retrolisthesis C5 on C6.  Severe spinal canal and foraminal stenosis bilaterally at C5-6.  On my review there is at least moderate spinal canal and bilateral foraminal stenosis at C6-7.    Analysis:  Based on the above clinical and radiographic findings, I recommended ACDF C5-6 C6-7.  I outlined the goals of surgery, material risks, and treatment alternatives.  I explained that her heavy smoking increases her risk for nonunion or other postoperative complications.  I explained that an updated bone density DEXA would be indicated to exclude significant osteoporosis.  She voiced understanding and wishes to proceed with surgery.  Her  also voiced understanding and concurs with surgery.  I provided her with a rigid cervical orthosis and instructed her to wear when ambulating or riding in a car.  I ordered a DEXA bone density, CT cervical spine, and cervical x-rays with flexion-extension views for preoperative planning.  I also recommended she establish care with a local primary care physician; referral made today.  I provided her with a refill of Norco 5/325 for symptom management until surgery.    [unfilled]   Nichelle was seen today for neck pain.    Diagnoses and all orders for this visit:    Cervical disc displacement  -     HYDROcodone-acetaminophen (NORCO) 5-325 mg per tablet; Take 1 tablet by mouth every 8 (eight) hours as needed for Pain.    Cervical disc disease  -     Ambulatory referral/consult to Neurosurgery  -     HYDROcodone-acetaminophen (NORCO) 5-325 mg per tablet; Take 1 tablet by mouth every 8 (eight) hours as needed for Pain.    DDD (degenerative disc disease), cervical  -     HYDROcodone-acetaminophen (NORCO) 5-325 mg per tablet; Take 1 tablet by mouth every 8 (eight) hours as needed for Pain.    Stenosis of cervical spine with myelopathy  -     HYDROcodone-acetaminophen (NORCO) 5-325 mg per tablet; Take 1  tablet by mouth every 8 (eight) hours as needed for Pain.         No follow-ups on file.     I spent a total of 60 minutes on the day of the visit.  This includes face to face time and non-face to face time preparing to see the patient (eg, review of tests), obtaining and/or reviewing separately obtained history, documenting clinical information in the electronic or other health record, independently interpreting results and communicating results to the patient/family/caregiver, or care coordinator.

## 2023-03-07 ENCOUNTER — TELEPHONE (OUTPATIENT)
Dept: FAMILY MEDICINE | Facility: CLINIC | Age: 68
End: 2023-03-07
Payer: MEDICARE

## 2023-03-07 NOTE — TELEPHONE ENCOUNTER
----- Message from Briana Baeza sent at 3/7/2023  1:56 PM CST -----  Contact: KAYLEY HANNAH [phone]  Type:  Sooner Appointment Request    Caller is requesting a sooner appointment.  Caller declined first available appointment listed below.  Caller will not accept being placed on the waitlist and is requesting a message be sent to doctor.    Name of Caller:   - Spouse     When is the first available appointment?  05-04-23      Best Call Back Number:  [phone] (home)        Additional Information:  Patient is calling office to speak with nurse/MA to schedule sooner appointment. Patient is scheduled for surgery on 04-04-23 and would like to [facility] with Dr. Amanda.    Please call back and advise. Thanks

## 2023-03-07 NOTE — TELEPHONE ENCOUNTER
Called patient back to schedule an establish care/pre-op appointment with Dr. Amanda. Patient's  answered. Patient has dementia, so  takes care of all her business. Messaged superusers to schedule patient on 03/15/2023 at 1pm with Dr. Amanda.

## 2023-03-13 DIAGNOSIS — M48.02 SPINAL STENOSIS, CERVICAL REGION: ICD-10-CM

## 2023-03-13 DIAGNOSIS — Z01.818 PRE-OP EXAM: ICD-10-CM

## 2023-03-13 DIAGNOSIS — M48.02 CERVICAL STENOSIS OF SPINE: Primary | ICD-10-CM

## 2023-03-13 DIAGNOSIS — M81.8 OTHER OSTEOPOROSIS WITHOUT CURRENT PATHOLOGICAL FRACTURE: ICD-10-CM

## 2023-03-13 DIAGNOSIS — M79.603 PAIN OF UPPER EXTREMITY, UNSPECIFIED LATERALITY: ICD-10-CM

## 2023-03-13 RX ORDER — MUPIROCIN 20 MG/G
OINTMENT TOPICAL
Status: CANCELLED | OUTPATIENT
Start: 2023-03-13

## 2023-03-13 RX ORDER — MUPIROCIN 20 MG/G
1 OINTMENT TOPICAL 2 TIMES DAILY
Status: CANCELLED | OUTPATIENT
Start: 2023-03-13 | End: 2023-03-14

## 2023-03-15 ENCOUNTER — OFFICE VISIT (OUTPATIENT)
Dept: FAMILY MEDICINE | Facility: CLINIC | Age: 68
End: 2023-03-15
Payer: MEDICARE

## 2023-03-15 VITALS
OXYGEN SATURATION: 98 % | WEIGHT: 126.56 LBS | HEART RATE: 86 BPM | BODY MASS INDEX: 21.61 KG/M2 | TEMPERATURE: 98 F | SYSTOLIC BLOOD PRESSURE: 130 MMHG | HEIGHT: 64 IN | DIASTOLIC BLOOD PRESSURE: 84 MMHG

## 2023-03-15 DIAGNOSIS — Z13.220 SCREENING FOR LIPID DISORDERS: ICD-10-CM

## 2023-03-15 DIAGNOSIS — G99.2 STENOSIS OF CERVICAL SPINE WITH MYELOPATHY: ICD-10-CM

## 2023-03-15 DIAGNOSIS — Z01.818 PRE-OPERATIVE CLEARANCE: Primary | ICD-10-CM

## 2023-03-15 DIAGNOSIS — M48.02 STENOSIS OF CERVICAL SPINE WITH MYELOPATHY: ICD-10-CM

## 2023-03-15 DIAGNOSIS — Z72.0 NICOTINE ABUSE: ICD-10-CM

## 2023-03-15 DIAGNOSIS — M32.9 SYSTEMIC LUPUS ERYTHEMATOSUS, UNSPECIFIED SLE TYPE, UNSPECIFIED ORGAN INVOLVEMENT STATUS: ICD-10-CM

## 2023-03-15 DIAGNOSIS — M50.20 DISPLACEMENT OF CERVICAL INTERVERTEBRAL DISC: ICD-10-CM

## 2023-03-15 DIAGNOSIS — F02.A4 MILD DEMENTIA ASSOCIATED WITH OTHER UNDERLYING DISEASE, WITH ANXIETY: ICD-10-CM

## 2023-03-15 PROBLEM — S72.90XA FRACTURE, FEMUR: Status: ACTIVE | Noted: 2023-03-15

## 2023-03-15 PROCEDURE — 99214 PR OFFICE/OUTPT VISIT, EST, LEVL IV, 30-39 MIN: ICD-10-PCS | Mod: S$PBB,,, | Performed by: NURSE PRACTITIONER

## 2023-03-15 PROCEDURE — 99999 PR PBB SHADOW E&M-EST. PATIENT-LVL III: ICD-10-PCS | Mod: PBBFAC,,, | Performed by: NURSE PRACTITIONER

## 2023-03-15 PROCEDURE — 99214 OFFICE O/P EST MOD 30 MIN: CPT | Mod: S$PBB,,, | Performed by: NURSE PRACTITIONER

## 2023-03-15 PROCEDURE — 99213 OFFICE O/P EST LOW 20 MIN: CPT | Mod: PBBFAC,PO | Performed by: NURSE PRACTITIONER

## 2023-03-15 PROCEDURE — 99999 PR PBB SHADOW E&M-EST. PATIENT-LVL III: CPT | Mod: PBBFAC,,, | Performed by: NURSE PRACTITIONER

## 2023-03-15 NOTE — PROGRESS NOTES
Patient ID: Nichelle Barone is a 68 y.o. female.    Chief Complaint: Pre-op Exam ([facility] neck surgery ) and Neck Pain (Pain radiate down left arm)      Nichelle Barone is in the office [facility] for discectomy .  Surgery scheduled on 04/04/2023 per Soto Villanueva  First time in [facility]  She is accompanied by   Neck Pain      [place] [facility] [place] States lives on a boat.  Does not have a primary care  provider here in area.  Past Medical History:   Diagnosis Date    Arthritis     Cancer     COPD (chronic obstructive pulmonary disease)     Fracture, femur     right    Fracture, foot     right    Heel fracture     left    Hypertension     Pulmonary embolism     Respiratory distress     Unspecified viral hepatitis C without hepatic coma               Current Outpatient Medications:     HYDROcodone-acetaminophen (NORCO) 5-325 mg per tablet, Take 1 tablet by mouth every 6 (six) hours as needed for Pain., Disp: 30 tablet, Rfl: 0    HYDROcodone-acetaminophen (NORCO) 5-325 mg per tablet, Take 1 tablet by mouth every 6 (six) hours as needed for Pain., Disp: 30 tablet, Rfl: 0    HYDROcodone-acetaminophen (NORCO) 5-325 mg per tablet, Take 1 tablet by mouth every 8 (eight) hours as needed for Pain., Disp: 21 tablet, Rfl: 0    OLANZapine (ZYPREXA) 5 MG tablet, , Disp: , Rfl:     colesevelam (WELCHOL) 625 mg tablet, Take by mouth., Disp: , Rfl:     nystatin (MYCOSTATIN) 500,000 unit Tab, Take by mouth., Disp: , Rfl:     The ASCVD Risk score (Britta DK, et al., 2019) failed to calculate for the following reasons:    Cannot find a previous HDL lab    Cannot find a previous total cholesterol lab     Wt Readings from Last 3 Encounters:   03/15/23 57.4 kg (126 lb 8.7 oz)   03/06/23 54.9 kg (120 lb 14.8 oz)   01/31/23 56.7 kg (125 lb)     Temp Readings from Last 3 Encounters:   03/15/23 97.8 °F (36.6 °C)   08/05/22 98 °F (36.7 °C) (Oral)   07/18/22 98.9 °F (37.2 °C)  (Oral)     BP Readings from Last 3 Encounters:   03/15/23 130/84   03/06/23 (!) 155/91   08/05/22 (!) 173/98     Pulse Readings from Last 3 Encounters:   03/15/23 86   03/06/23 105   08/05/22 106     Resp Readings from Last 3 Encounters:   08/06/22 18   07/18/22 18     PF Readings from Last 3 Encounters:   No data found for PF     SpO2 Readings from Last 3 Encounters:   03/15/23 98%   08/05/22 98%   07/20/22 98%        No results found for: HGBA1C  Lab Results   Component Value Date    CREATININE 1.2 08/05/2022       Review of Systems   Musculoskeletal:  Positive for neck pain.         Objective:      Physical Exam  Vitals and nursing note reviewed.   Constitutional:       Appearance: Normal appearance.   HENT:      Head: Normocephalic and atraumatic.      Mouth/Throat:      Dentition: Abnormal dentition.        Comments: She has 2 upper posterior molars and 2 front upper teeth left,  Most missing on bottom right and left  Eyes:      General: Lids are normal.   Cardiovascular:      Rate and Rhythm: Normal rate and regular rhythm.      Heart sounds: Normal heart sounds.      Comments: Tenderness to left lower leg and states pain from neuropathy and history of broken achilles heel  Pulmonary:      Effort: Pulmonary effort is normal.      Breath sounds: Normal breath sounds and air entry.   Musculoskeletal:      Cervical back: Full passive range of motion without pain, normal range of motion and neck supple.      Right lower leg: No edema.      Left lower leg: No edema.   Skin:     General: Skin is warm and dry.   Neurological:      General: No focal deficit present.      Mental Status: She is alert and oriented to person, place, and time.      Motor: No weakness.      Comments: Tenderness to left lower leg and states pain from neuropathy and history of broken achilles heel   Psychiatric:         Attention and Perception: Attention and perception normal.         Mood and Affect: Mood and affect normal.         Speech:  Speech normal.         Behavior: Behavior normal. Behavior is cooperative.         Thought Content: Thought content normal.         Cognition and Memory: Cognition and memory normal.         Judgment: Judgment normal.           Screening recommendations appropriate to age and health status were reviewed.    Pre-operative clearance    Stenosis of cervical spine with myelopathy    Displacement of cervical intervertebral disc    Mild dementia associated with other underlying disease, with anxiety    Systemic lupus erythematosus, unspecified SLE type, unspecified organ involvement status    Nicotine abuse    Screening for lipid disorders        RCRI risk factors include: high risk type of surgery, history of ischemic disease, history of heart failure, history of cerebrovascular disease, diabetes requiring treatment with insulin, pre-op serum creatinine >2.0, and no known RCRI risk factors. As such, per RCRI the risk of cardiac death, nonfatal myocardial infarction, or nonfatal cardiac arrest is 0.4% and the risk of myocardial infarction, pulmonary edema, ventricular fibrillation, primary cardiac arrest, or complete heart block is 0.5%.  Overall this patient can be considered low risk for this low risk procedure. No further cardiac testing is recommended at this time.     Patient denies any symptoms (as per HPI) concerning for undiagnosed lung disease including KEYON. Would not recommend obtaining chest X-ray, sleep study, or PFTs at this time. Patient was counseled on the importance of quitting smoking ideally 8 weeks prior to surgery. We discussed the benefits of early mobilization and deep breathing after surgery.      Screened patient for alcohol misuse, use of illicit drugs, and personal or family history of anesthetic complications or bleeding diathesis and no substantial concerns were identified. Denies any of the above  Smokes about 3/4 pack cigarettes per day  She has smoked for 50+ years    All current  medications were reviewed and at this time no changes to medications are recommended prior to surgery.     I recommend use of standard pre-op and post-op precautions for this patient. In my opinion, she is medically optimized for this procedure, and can proceed without further evaluation.

## 2023-03-16 DIAGNOSIS — M48.02 CERVICAL STENOSIS OF SPINE: Primary | ICD-10-CM

## 2023-03-16 RX ORDER — HYDROCODONE BITARTRATE AND ACETAMINOPHEN 5; 325 MG/1; MG/1
1 TABLET ORAL EVERY 6 HOURS PRN
Qty: 21 TABLET | Refills: 0 | Status: SHIPPED | OUTPATIENT
Start: 2023-03-16 | End: 2023-03-23

## 2023-03-16 NOTE — TELEPHONE ENCOUNTER
----- Message from Cynthia Barrera sent at 3/16/2023  1:18 PM CDT -----  Contact: Pt's spouse/ Lito  Type:  RX Refill Request    Who Called: Pt's spouse/ Lito  Refill or New Rx:Refill  RX Name and Strength:HYDROcodone-acetaminophen (NORCO) 5-325 mg per tablet  How is the patient currently taking it? (ex. 1XDay):2XDay  Is this a 30 day or 90 day RX:30 day  Preferred Pharmacy with phone number:  [facility] [place], [place] - [address]  [address]  [facility] [address]  Phone: [phone] Fax: [phone]      Local or Mail Order:Local  Ordering Provider:Soto Heller  Would the patient rather a call back or a response via [facility]? call  Best Call Back Number:[phone]    Additional Information: Pt's spouse would like a refill on pt's prescription called in to pt's pharmacy.

## 2023-03-16 NOTE — TELEPHONE ENCOUNTER
----- Message from Cynthia Barrera sent at 3/16/2023  1:18 PM CDT -----  Contact: Pt's spouse/ Lito  Type:  RX Refill Request    Who Called: Pt's spouse/ Lito  Refill or New Rx:Refill  RX Name and Strength:HYDROcodone-acetaminophen (NORCO) 5-325 mg per tablet  How is the patient currently taking it? (ex. 1XDay):2XDay  Is this a 30 day or 90 day RX:30 day  Preferred Pharmacy with phone number:  [facility] [place], [place] - [address]  [address]  [facility] [address]  Phone: [phone] Fax: [phone]      Local or Mail Order:Local  Ordering Provider:Soto Heller  Would the patient rather a call back or a response via [facility]? call  Best Call Back Number:[phone]    Additional Information: Pt's spouse would like a refill on pt's prescription called in to pt's pharmacy.

## 2023-03-16 NOTE — TELEPHONE ENCOUNTER
----- Message from Cynthia Barrera sent at 3/16/2023  1:18 PM CDT -----  Contact: Pt's spouse/ Lito  Type:  RX Refill Request    Who Called: Pt's spouse/ Lito  Refill or New Rx:Refill  RX Name and Strength:HYDROcodone-acetaminophen (NORCO) 5-325 mg per tablet  How is the patient currently taking it? (ex. 1XDay):2XDay  Is this a 30 day or 90 day RX:30 day  Preferred Pharmacy with phone number:  [facility] [place], [place] - [address]  [address]  [facility] [address]  Phone: [phone] Fax: [phone]      Local or Mail Order:Local  Ordering Provider:Soto Heller  Would the patient rather a call back or a response via [facility]? call  Best Call Back Number:[phone]    Additional Information: Pt's spouse would like a refill on pt's prescription called in to pt's pharmacy.

## 2023-03-17 ENCOUNTER — HOSPITAL ENCOUNTER (OUTPATIENT)
Dept: RADIOLOGY | Facility: HOSPITAL | Age: 68
Discharge: HOME | End: 2023-03-17
Attending: NEUROLOGICAL SURGERY
Payer: MEDICARE

## 2023-03-17 DIAGNOSIS — M48.02 SPINAL STENOSIS, CERVICAL REGION: ICD-10-CM

## 2023-03-17 DIAGNOSIS — Z01.818 PRE-OP EXAM: ICD-10-CM

## 2023-03-17 DIAGNOSIS — M81.8 OTHER OSTEOPOROSIS WITHOUT CURRENT PATHOLOGICAL FRACTURE: ICD-10-CM

## 2023-03-17 PROCEDURE — 72125 CT NECK SPINE W/O DYE: CPT | Mod: TC,PO

## 2023-03-17 PROCEDURE — 77080 DXA BONE DENSITY AXIAL: CPT | Mod: TC,PO

## 2023-03-17 PROCEDURE — 72052 X-RAY EXAM NECK SPINE 6/>VWS: CPT | Mod: TC,PO

## 2023-03-21 ENCOUNTER — HOSPITAL ENCOUNTER (OUTPATIENT)
Dept: PREADMISSION TESTING | Facility: HOSPITAL | Age: 68
Discharge: HOME | End: 2023-03-21
Attending: NEUROLOGICAL SURGERY
Payer: MEDICARE

## 2023-03-21 ENCOUNTER — HOSPITAL ENCOUNTER (OUTPATIENT)
Dept: RADIOLOGY | Facility: HOSPITAL | Age: 68
Discharge: HOME | End: 2023-03-21
Attending: NEUROLOGICAL SURGERY
Payer: MEDICARE

## 2023-03-21 VITALS
HEART RATE: 69 BPM | OXYGEN SATURATION: 98 % | WEIGHT: 125 LBS | SYSTOLIC BLOOD PRESSURE: 146 MMHG | RESPIRATION RATE: 20 BRPM | BODY MASS INDEX: 21.34 KG/M2 | DIASTOLIC BLOOD PRESSURE: 89 MMHG | TEMPERATURE: 98 F | HEIGHT: 64 IN

## 2023-03-21 DIAGNOSIS — Z01.818 PRE-OP TESTING: Primary | ICD-10-CM

## 2023-03-21 DIAGNOSIS — M79.603 PAIN OF UPPER EXTREMITY, UNSPECIFIED LATERALITY: ICD-10-CM

## 2023-03-21 DIAGNOSIS — Z01.818 PRE-OP TESTING: ICD-10-CM

## 2023-03-21 DIAGNOSIS — Z01.818 PRE-OP EXAM: ICD-10-CM

## 2023-03-21 DIAGNOSIS — M48.02 SPINAL STENOSIS, CERVICAL REGION: ICD-10-CM

## 2023-03-21 LAB
ANION GAP SERPL CALC-SCNC: 12 MMOL/L (ref 8–16)
APTT BLDCRRT: 28.5 SEC (ref 21–32)
BASOPHILS # BLD AUTO: 0.05 K/UL (ref 0–0.2)
BASOPHILS NFR BLD: 0.5 % (ref 0–1.9)
BUN SERPL-MCNC: 10 MG/DL (ref 8–23)
CALCIUM SERPL-MCNC: 10 MG/DL (ref 8.7–10.5)
CHLORIDE SERPL-SCNC: 98 MMOL/L (ref 95–110)
CO2 SERPL-SCNC: 31 MMOL/L (ref 23–29)
CREAT SERPL-MCNC: 1 MG/DL (ref 0.5–1.4)
DIFFERENTIAL METHOD: NORMAL
EOSINOPHIL # BLD AUTO: 0.1 K/UL (ref 0–0.5)
EOSINOPHIL NFR BLD: 0.8 % (ref 0–8)
ERYTHROCYTE [DISTWIDTH] IN BLOOD BY AUTOMATED COUNT: 13.6 % (ref 11.5–14.5)
EST. GFR  (NO RACE VARIABLE): >60 ML/MIN/1.73 M^2
GLUCOSE SERPL-MCNC: 95 MG/DL (ref 70–110)
HCT VFR BLD AUTO: 44.9 % (ref 37–48.5)
HGB BLD-MCNC: 14.5 G/DL (ref 12–16)
IMM GRANULOCYTES # BLD AUTO: 0.03 K/UL (ref 0–0.04)
IMM GRANULOCYTES NFR BLD AUTO: 0.3 % (ref 0–0.5)
INR PPP: 1 (ref 0.8–1.2)
LYMPHOCYTES # BLD AUTO: 3.2 K/UL (ref 1–4.8)
LYMPHOCYTES NFR BLD: 28.7 % (ref 18–48)
MCH RBC QN AUTO: 30.5 PG (ref 27–31)
MCHC RBC AUTO-ENTMCNC: 32.3 G/DL (ref 32–36)
MCV RBC AUTO: 94 FL (ref 82–98)
MONOCYTES # BLD AUTO: 0.8 K/UL (ref 0.3–1)
MONOCYTES NFR BLD: 7.2 % (ref 4–15)
NEUTROPHILS # BLD AUTO: 6.9 K/UL (ref 1.8–7.7)
NEUTROPHILS NFR BLD: 62.5 % (ref 38–73)
NRBC BLD-RTO: 0 /100 WBC
PLATELET # BLD AUTO: 239 K/UL (ref 150–450)
PMV BLD AUTO: 11 FL (ref 9.2–12.9)
POTASSIUM SERPL-SCNC: 3.8 MMOL/L (ref 3.5–5.1)
PROTHROMBIN TIME: 10.3 SEC (ref 9–12.5)
RBC # BLD AUTO: 4.76 M/UL (ref 4–5.4)
SODIUM SERPL-SCNC: 141 MMOL/L (ref 136–145)
WBC # BLD AUTO: 11.02 K/UL (ref 3.9–12.7)

## 2023-03-21 PROCEDURE — 93010 ELECTROCARDIOGRAM REPORT: CPT | Mod: ,,, | Performed by: GENERAL PRACTICE

## 2023-03-21 PROCEDURE — 85730 THROMBOPLASTIN TIME PARTIAL: CPT | Performed by: NEUROLOGICAL SURGERY

## 2023-03-21 PROCEDURE — 85025 COMPLETE CBC W/AUTO DIFF WBC: CPT | Performed by: NEUROLOGICAL SURGERY

## 2023-03-21 PROCEDURE — 93010 EKG 12-LEAD: ICD-10-PCS | Mod: ,,, | Performed by: GENERAL PRACTICE

## 2023-03-21 PROCEDURE — 93005 ELECTROCARDIOGRAM TRACING: CPT | Performed by: GENERAL PRACTICE

## 2023-03-21 PROCEDURE — 80048 BASIC METABOLIC PNL TOTAL CA: CPT | Performed by: NEUROLOGICAL SURGERY

## 2023-03-21 PROCEDURE — 71046 X-RAY EXAM CHEST 2 VIEWS: CPT | Mod: TC

## 2023-03-21 PROCEDURE — 85610 PROTHROMBIN TIME: CPT | Performed by: NEUROLOGICAL SURGERY

## 2023-03-21 RX ORDER — LANOLIN ALCOHOL/MO/W.PET/CERES
100 CREAM (GRAM) TOPICAL DAILY
COMMUNITY
End: 2023-07-19

## 2023-03-21 RX ORDER — ERGOCALCIFEROL 1.25 MG/1
50000 CAPSULE ORAL
COMMUNITY

## 2023-03-27 ENCOUNTER — TELEPHONE (OUTPATIENT)
Dept: NEUROSURGERY | Facility: CLINIC | Age: 68
End: 2023-03-27
Payer: MEDICARE

## 2023-03-27 NOTE — TELEPHONE ENCOUNTER
Contacted pt and spoke with spouse. Confirmed that pt has not taken any blood thinners/anti inflammatories in the past 10 days and does not have any open wounds/rashes or new medical problems since evaluated by Dr. Heller. Reminded him that pt should be npo after midnight and to contact our office with any questions or concerns. He voiced understanding.

## 2023-03-28 ENCOUNTER — HOSPITAL ENCOUNTER (INPATIENT)
Facility: HOSPITAL | Age: 68
LOS: 1 days | Discharge: HOME | DRG: 472 | End: 2023-03-29
Attending: NEUROLOGICAL SURGERY | Admitting: NEUROLOGICAL SURGERY
Payer: MEDICARE

## 2023-03-28 ENCOUNTER — ANESTHESIA EVENT (OUTPATIENT)
Dept: SURGERY | Facility: HOSPITAL | Age: 68
DRG: 472 | End: 2023-03-28
Payer: MEDICARE

## 2023-03-28 ENCOUNTER — ANESTHESIA (OUTPATIENT)
Dept: SURGERY | Facility: HOSPITAL | Age: 68
DRG: 472 | End: 2023-03-28
Payer: MEDICARE

## 2023-03-28 DIAGNOSIS — R52 PAIN: ICD-10-CM

## 2023-03-28 DIAGNOSIS — M50.30 DDD (DEGENERATIVE DISC DISEASE), CERVICAL: Primary | ICD-10-CM

## 2023-03-28 DIAGNOSIS — G99.2 STENOSIS OF CERVICAL SPINE WITH MYELOPATHY: Primary | ICD-10-CM

## 2023-03-28 DIAGNOSIS — M48.02 STENOSIS OF CERVICAL SPINE WITH MYELOPATHY: Primary | ICD-10-CM

## 2023-03-28 DIAGNOSIS — M48.02 CERVICAL STENOSIS OF SPINE: ICD-10-CM

## 2023-03-28 PROBLEM — K52.9 CHRONIC DIARRHEA: Status: ACTIVE | Noted: 2023-03-28

## 2023-03-28 PROBLEM — J44.9 CHRONIC OBSTRUCTIVE PULMONARY DISEASE: Status: ACTIVE | Noted: 2023-03-28

## 2023-03-28 PROBLEM — I10 ESSENTIAL HYPERTENSION: Status: ACTIVE | Noted: 2023-03-28

## 2023-03-28 PROCEDURE — 63600175 PHARM REV CODE 636 W HCPCS: Performed by: NEUROLOGICAL SURGERY

## 2023-03-28 PROCEDURE — 22853 PR INSERT BIOMECH DEV W/INTERBODY ARTHRODESIS, EA CONTIGUOUS DEFECT: ICD-10-PCS | Mod: ,,, | Performed by: NEUROLOGICAL SURGERY

## 2023-03-28 PROCEDURE — 36620 INSERTION CATHETER ARTERY: CPT | Mod: ,,, | Performed by: ANESTHESIOLOGY

## 2023-03-28 PROCEDURE — 37000009 HC ANESTHESIA EA ADD 15 MINS: Performed by: NEUROLOGICAL SURGERY

## 2023-03-28 PROCEDURE — D9220A PRA ANESTHESIA: ICD-10-PCS | Mod: CRNA,,, | Performed by: NURSE ANESTHETIST, CERTIFIED REGISTERED

## 2023-03-28 PROCEDURE — 37000008 HC ANESTHESIA 1ST 15 MINUTES: Performed by: NEUROLOGICAL SURGERY

## 2023-03-28 PROCEDURE — 25000003 PHARM REV CODE 250

## 2023-03-28 PROCEDURE — 20936 PR AUTOGRAFT SPINE SURGERY LOCAL FROM SAME INCISION: ICD-10-PCS | Mod: ,,, | Performed by: NEUROLOGICAL SURGERY

## 2023-03-28 PROCEDURE — D9220A PRA ANESTHESIA: Mod: ANES,,, | Performed by: ANESTHESIOLOGY

## 2023-03-28 PROCEDURE — 63600175 PHARM REV CODE 636 W HCPCS: Performed by: ANESTHESIOLOGY

## 2023-03-28 PROCEDURE — 25000003 PHARM REV CODE 250: Performed by: NEUROLOGICAL SURGERY

## 2023-03-28 PROCEDURE — C1713 ANCHOR/SCREW BN/BN,TIS/BN: HCPCS | Performed by: NEUROLOGICAL SURGERY

## 2023-03-28 PROCEDURE — 36000711: Performed by: NEUROLOGICAL SURGERY

## 2023-03-28 PROCEDURE — 99900035 HC TECH TIME PER 15 MIN (STAT)

## 2023-03-28 PROCEDURE — 22551 PR ARTHRODESIS ANT INTERBODY INC DISCECTOMY, CERVICAL BELOW C2: ICD-10-PCS | Mod: ,,, | Performed by: NEUROLOGICAL SURGERY

## 2023-03-28 PROCEDURE — 20936 SP BONE AGRFT LOCAL ADD-ON: CPT | Mod: ,,, | Performed by: NEUROLOGICAL SURGERY

## 2023-03-28 PROCEDURE — 22845 PR ANTERIOR INSTRUMENTATION 2-3 VERTEBRAL SEGMENTS: ICD-10-PCS | Mod: 59,,, | Performed by: NEUROLOGICAL SURGERY

## 2023-03-28 PROCEDURE — 36620 ARTERIAL: ICD-10-PCS | Mod: ,,, | Performed by: ANESTHESIOLOGY

## 2023-03-28 PROCEDURE — 71000033 HC RECOVERY, INTIAL HOUR: Performed by: NEUROLOGICAL SURGERY

## 2023-03-28 PROCEDURE — 63600175 PHARM REV CODE 636 W HCPCS: Performed by: NURSE ANESTHETIST, CERTIFIED REGISTERED

## 2023-03-28 PROCEDURE — 25000003 PHARM REV CODE 250: Performed by: ANESTHESIOLOGY

## 2023-03-28 PROCEDURE — D9220A PRA ANESTHESIA: Mod: CRNA,,, | Performed by: NURSE ANESTHETIST, CERTIFIED REGISTERED

## 2023-03-28 PROCEDURE — 22551 ARTHRD ANT NTRBDY CERVICAL: CPT | Mod: ,,, | Performed by: NEUROLOGICAL SURGERY

## 2023-03-28 PROCEDURE — 71000039 HC RECOVERY, EACH ADD'L HOUR: Performed by: NEUROLOGICAL SURGERY

## 2023-03-28 PROCEDURE — 36000710: Performed by: NEUROLOGICAL SURGERY

## 2023-03-28 PROCEDURE — 94761 N-INVAS EAR/PLS OXIMETRY MLT: CPT

## 2023-03-28 PROCEDURE — 20930 PR ALLOGRAFT FOR SPINE SURGERY ONLY MORSELIZED: ICD-10-PCS | Mod: ,,, | Performed by: NEUROLOGICAL SURGERY

## 2023-03-28 PROCEDURE — 22845 INSERT SPINE FIXATION DEVICE: CPT | Mod: 59,,, | Performed by: NEUROLOGICAL SURGERY

## 2023-03-28 PROCEDURE — 22853 INSJ BIOMECHANICAL DEVICE: CPT | Mod: ,,, | Performed by: NEUROLOGICAL SURGERY

## 2023-03-28 PROCEDURE — C1762 CONN TISS, HUMAN(INC FASCIA): HCPCS | Performed by: NEUROLOGICAL SURGERY

## 2023-03-28 PROCEDURE — 25000003 PHARM REV CODE 250: Performed by: NURSE ANESTHETIST, CERTIFIED REGISTERED

## 2023-03-28 PROCEDURE — D9220A PRA ANESTHESIA: ICD-10-PCS | Mod: ANES,,, | Performed by: ANESTHESIOLOGY

## 2023-03-28 PROCEDURE — 21400001 HC TELEMETRY ROOM

## 2023-03-28 PROCEDURE — 94799 UNLISTED PULMONARY SVC/PX: CPT

## 2023-03-28 PROCEDURE — 22552 PR ARTHRODESIS ANT INTERBODY INC DISCECTOMY, CERVICAL BELOW C2 EACH ADDL: ICD-10-PCS | Mod: ,,, | Performed by: NEUROLOGICAL SURGERY

## 2023-03-28 PROCEDURE — 27201423 OPTIME MED/SURG SUP & DEVICES STERILE SUPPLY: Performed by: NEUROLOGICAL SURGERY

## 2023-03-28 PROCEDURE — 20930 SP BONE ALGRFT MORSEL ADD-ON: CPT | Mod: ,,, | Performed by: NEUROLOGICAL SURGERY

## 2023-03-28 PROCEDURE — 22552 ARTHRD ANT NTRBD CERVICAL EA: CPT | Mod: ,,, | Performed by: NEUROLOGICAL SURGERY

## 2023-03-28 DEVICE — IMPLANTABLE DEVICE: Type: IMPLANTABLE DEVICE | Site: SPINE CERVICAL | Status: FUNCTIONAL

## 2023-03-28 RX ORDER — GABAPENTIN 300 MG/1
300 CAPSULE ORAL ONCE
Status: COMPLETED | OUTPATIENT
Start: 2023-03-28 | End: 2023-03-28

## 2023-03-28 RX ORDER — CEFAZOLIN SODIUM 2 G/50ML
2 SOLUTION INTRAVENOUS
Status: COMPLETED | OUTPATIENT
Start: 2023-03-28 | End: 2023-03-29

## 2023-03-28 RX ORDER — BISACODYL 10 MG
10 SUPPOSITORY, RECTAL RECTAL DAILY
Status: DISCONTINUED | OUTPATIENT
Start: 2023-03-29 | End: 2023-03-29 | Stop reason: HOSPADM

## 2023-03-28 RX ORDER — METHYLPREDNISOLONE ACETATE 80 MG/ML
INJECTION, SUSPENSION INTRA-ARTICULAR; INTRALESIONAL; INTRAMUSCULAR; SOFT TISSUE
Status: DISCONTINUED | OUTPATIENT
Start: 2023-03-28 | End: 2023-03-28 | Stop reason: HOSPADM

## 2023-03-28 RX ORDER — PROCHLORPERAZINE EDISYLATE 5 MG/ML
5 INJECTION INTRAMUSCULAR; INTRAVENOUS EVERY 6 HOURS PRN
Status: DISCONTINUED | OUTPATIENT
Start: 2023-03-28 | End: 2023-03-29 | Stop reason: HOSPADM

## 2023-03-28 RX ORDER — FAMOTIDINE 10 MG/ML
INJECTION INTRAVENOUS
Status: DISCONTINUED | OUTPATIENT
Start: 2023-03-28 | End: 2023-03-28

## 2023-03-28 RX ORDER — KETAMINE HYDROCHLORIDE 50 MG/ML
INJECTION, SOLUTION INTRAMUSCULAR; INTRAVENOUS
Status: DISCONTINUED | OUTPATIENT
Start: 2023-03-28 | End: 2023-03-28

## 2023-03-28 RX ORDER — METOPROLOL TARTRATE 1 MG/ML
INJECTION, SOLUTION INTRAVENOUS
Status: COMPLETED
Start: 2023-03-28 | End: 2023-03-28

## 2023-03-28 RX ORDER — LIDOCAINE HYDROCHLORIDE 20 MG/ML
INJECTION, SOLUTION EPIDURAL; INFILTRATION; INTRACAUDAL; PERINEURAL
Status: DISCONTINUED | OUTPATIENT
Start: 2023-03-28 | End: 2023-03-28

## 2023-03-28 RX ORDER — ONDANSETRON 2 MG/ML
INJECTION INTRAMUSCULAR; INTRAVENOUS
Status: DISCONTINUED | OUTPATIENT
Start: 2023-03-28 | End: 2023-03-28

## 2023-03-28 RX ORDER — MUPIROCIN 20 MG/G
OINTMENT TOPICAL
Status: DISCONTINUED | OUTPATIENT
Start: 2023-03-28 | End: 2023-03-28 | Stop reason: HOSPADM

## 2023-03-28 RX ORDER — MUPIROCIN 20 MG/G
OINTMENT TOPICAL 2 TIMES DAILY
Status: DISCONTINUED | OUTPATIENT
Start: 2023-03-28 | End: 2023-03-29 | Stop reason: HOSPADM

## 2023-03-28 RX ORDER — DIPHENHYDRAMINE HYDROCHLORIDE 50 MG/ML
12.5 INJECTION INTRAMUSCULAR; INTRAVENOUS
Status: DISCONTINUED | OUTPATIENT
Start: 2023-03-28 | End: 2023-03-28 | Stop reason: HOSPADM

## 2023-03-28 RX ORDER — MAG HYDROX/ALUMINUM HYD/SIMETH 200-200-20
30 SUSPENSION, ORAL (FINAL DOSE FORM) ORAL EVERY 4 HOURS PRN
Status: DISCONTINUED | OUTPATIENT
Start: 2023-03-28 | End: 2023-03-29 | Stop reason: HOSPADM

## 2023-03-28 RX ORDER — ONDANSETRON 4 MG/1
8 TABLET, ORALLY DISINTEGRATING ORAL EVERY 6 HOURS PRN
Status: DISCONTINUED | OUTPATIENT
Start: 2023-03-28 | End: 2023-03-29 | Stop reason: HOSPADM

## 2023-03-28 RX ORDER — DEXAMETHASONE SODIUM PHOSPHATE 4 MG/ML
2 INJECTION, SOLUTION INTRA-ARTICULAR; INTRALESIONAL; INTRAMUSCULAR; INTRAVENOUS; SOFT TISSUE EVERY 6 HOURS
Status: DISCONTINUED | OUTPATIENT
Start: 2023-03-29 | End: 2023-03-29 | Stop reason: HOSPADM

## 2023-03-28 RX ORDER — HYDRALAZINE HYDROCHLORIDE 20 MG/ML
5 INJECTION INTRAMUSCULAR; INTRAVENOUS
Status: COMPLETED | OUTPATIENT
Start: 2023-03-28 | End: 2023-03-28

## 2023-03-28 RX ORDER — METOPROLOL TARTRATE 1 MG/ML
2.5 INJECTION, SOLUTION INTRAVENOUS EVERY 5 MIN PRN
Status: DISCONTINUED | OUTPATIENT
Start: 2023-03-28 | End: 2023-03-28 | Stop reason: HOSPADM

## 2023-03-28 RX ORDER — FENTANYL CITRATE 50 UG/ML
INJECTION, SOLUTION INTRAMUSCULAR; INTRAVENOUS
Status: DISCONTINUED | OUTPATIENT
Start: 2023-03-28 | End: 2023-03-28

## 2023-03-28 RX ORDER — MUPIROCIN 20 MG/G
1 OINTMENT TOPICAL 2 TIMES DAILY
Status: DISCONTINUED | OUTPATIENT
Start: 2023-03-28 | End: 2023-03-28 | Stop reason: HOSPADM

## 2023-03-28 RX ORDER — OXYCODONE HYDROCHLORIDE 5 MG/1
5 TABLET ORAL
Status: DISCONTINUED | OUTPATIENT
Start: 2023-03-28 | End: 2023-03-28 | Stop reason: HOSPADM

## 2023-03-28 RX ORDER — AMOXICILLIN 250 MG
2 CAPSULE ORAL NIGHTLY PRN
Status: DISCONTINUED | OUTPATIENT
Start: 2023-03-28 | End: 2023-03-29 | Stop reason: HOSPADM

## 2023-03-28 RX ORDER — ACETAMINOPHEN 10 MG/ML
INJECTION, SOLUTION INTRAVENOUS
Status: DISCONTINUED | OUTPATIENT
Start: 2023-03-28 | End: 2023-03-28

## 2023-03-28 RX ORDER — VANCOMYCIN HCL IN 5 % DEXTROSE 1G/250ML
1000 PLASTIC BAG, INJECTION (ML) INTRAVENOUS
Status: DISCONTINUED | OUTPATIENT
Start: 2023-03-28 | End: 2023-03-28 | Stop reason: HOSPADM

## 2023-03-28 RX ORDER — OXYCODONE AND ACETAMINOPHEN 5; 325 MG/1; MG/1
1 TABLET ORAL EVERY 4 HOURS PRN
Status: DISCONTINUED | OUTPATIENT
Start: 2023-03-28 | End: 2023-03-29 | Stop reason: HOSPADM

## 2023-03-28 RX ORDER — SODIUM CHLORIDE 9 MG/ML
INJECTION, SOLUTION INTRAVENOUS CONTINUOUS
Status: DISCONTINUED | OUTPATIENT
Start: 2023-03-28 | End: 2023-03-29 | Stop reason: HOSPADM

## 2023-03-28 RX ORDER — SUCCINYLCHOLINE CHLORIDE 20 MG/ML
INJECTION INTRAMUSCULAR; INTRAVENOUS
Status: DISCONTINUED | OUTPATIENT
Start: 2023-03-28 | End: 2023-03-28

## 2023-03-28 RX ORDER — OXYCODONE HYDROCHLORIDE 5 MG/1
10 TABLET ORAL EVERY 4 HOURS PRN
Status: DISCONTINUED | OUTPATIENT
Start: 2023-03-28 | End: 2023-03-29 | Stop reason: HOSPADM

## 2023-03-28 RX ORDER — DEXMEDETOMIDINE HYDROCHLORIDE 100 UG/ML
INJECTION, SOLUTION INTRAVENOUS
Status: DISPENSED
Start: 2023-03-28 | End: 2023-03-29

## 2023-03-28 RX ORDER — BUPIVACAINE HYDROCHLORIDE AND EPINEPHRINE 5; 5 MG/ML; UG/ML
INJECTION, SOLUTION EPIDURAL; INTRACAUDAL; PERINEURAL
Status: DISCONTINUED | OUTPATIENT
Start: 2023-03-28 | End: 2023-03-28 | Stop reason: HOSPADM

## 2023-03-28 RX ORDER — PROPOFOL 10 MG/ML
VIAL (ML) INTRAVENOUS CONTINUOUS PRN
Status: DISCONTINUED | OUTPATIENT
Start: 2023-03-28 | End: 2023-03-28

## 2023-03-28 RX ORDER — DEXAMETHASONE SODIUM PHOSPHATE 4 MG/ML
INJECTION, SOLUTION INTRA-ARTICULAR; INTRALESIONAL; INTRAMUSCULAR; INTRAVENOUS; SOFT TISSUE
Status: DISCONTINUED | OUTPATIENT
Start: 2023-03-28 | End: 2023-03-28

## 2023-03-28 RX ORDER — SCOLOPAMINE TRANSDERMAL SYSTEM 1 MG/1
1 PATCH, EXTENDED RELEASE TRANSDERMAL ONCE
Status: DISCONTINUED | OUTPATIENT
Start: 2023-03-28 | End: 2023-03-29 | Stop reason: HOSPADM

## 2023-03-28 RX ORDER — MIDAZOLAM HYDROCHLORIDE 1 MG/ML
INJECTION INTRAMUSCULAR; INTRAVENOUS
Status: DISCONTINUED | OUTPATIENT
Start: 2023-03-28 | End: 2023-03-28

## 2023-03-28 RX ORDER — OLANZAPINE 5 MG/1
5 TABLET ORAL NIGHTLY
Status: DISCONTINUED | OUTPATIENT
Start: 2023-03-28 | End: 2023-03-29 | Stop reason: HOSPADM

## 2023-03-28 RX ORDER — PROPOFOL 10 MG/ML
VIAL (ML) INTRAVENOUS
Status: DISCONTINUED | OUTPATIENT
Start: 2023-03-28 | End: 2023-03-28

## 2023-03-28 RX ORDER — PHENYLEPHRINE HCL IN 0.9% NACL 1 MG/10 ML
SYRINGE (ML) INTRAVENOUS
Status: DISCONTINUED | OUTPATIENT
Start: 2023-03-28 | End: 2023-03-28

## 2023-03-28 RX ORDER — ONDANSETRON 2 MG/ML
4 INJECTION INTRAMUSCULAR; INTRAVENOUS DAILY PRN
Status: DISCONTINUED | OUTPATIENT
Start: 2023-03-28 | End: 2023-03-28 | Stop reason: HOSPADM

## 2023-03-28 RX ORDER — HYDROMORPHONE HYDROCHLORIDE 1 MG/ML
0.2 INJECTION, SOLUTION INTRAMUSCULAR; INTRAVENOUS; SUBCUTANEOUS EVERY 5 MIN PRN
Status: DISCONTINUED | OUTPATIENT
Start: 2023-03-28 | End: 2023-03-28 | Stop reason: HOSPADM

## 2023-03-28 RX ORDER — HYDROMORPHONE HYDROCHLORIDE 1 MG/ML
1 INJECTION, SOLUTION INTRAMUSCULAR; INTRAVENOUS; SUBCUTANEOUS
Status: DISCONTINUED | OUTPATIENT
Start: 2023-03-28 | End: 2023-03-29 | Stop reason: HOSPADM

## 2023-03-28 RX ORDER — ACETAMINOPHEN 325 MG/1
650 TABLET ORAL EVERY 6 HOURS PRN
Status: DISCONTINUED | OUTPATIENT
Start: 2023-03-28 | End: 2023-03-29 | Stop reason: HOSPADM

## 2023-03-28 RX ADMIN — PROPOFOL 150 MG: 10 INJECTION, EMULSION INTRAVENOUS at 02:03

## 2023-03-28 RX ADMIN — OXYCODONE HYDROCHLORIDE 5 MG: 5 TABLET ORAL at 07:03

## 2023-03-28 RX ADMIN — ONDANSETRON 4 MG: 2 INJECTION INTRAMUSCULAR; INTRAVENOUS at 02:03

## 2023-03-28 RX ADMIN — KETAMINE HYDROCHLORIDE 50 MG: 50 INJECTION INTRAMUSCULAR; INTRAVENOUS at 03:03

## 2023-03-28 RX ADMIN — HYDROMORPHONE HYDROCHLORIDE 0.2 MG: 1 INJECTION, SOLUTION INTRAMUSCULAR; INTRAVENOUS; SUBCUTANEOUS at 07:03

## 2023-03-28 RX ADMIN — FAMOTIDINE 20 MG: 10 INJECTION, SOLUTION INTRAVENOUS at 03:03

## 2023-03-28 RX ADMIN — SODIUM CHLORIDE: 0.9 INJECTION, SOLUTION INTRAVENOUS at 07:03

## 2023-03-28 RX ADMIN — Medication 100 MG: at 02:03

## 2023-03-28 RX ADMIN — FENTANYL CITRATE 100 MCG: 50 INJECTION, SOLUTION INTRAMUSCULAR; INTRAVENOUS at 04:03

## 2023-03-28 RX ADMIN — GABAPENTIN 300 MG: 300 CAPSULE ORAL at 02:03

## 2023-03-28 RX ADMIN — Medication 100 MCG: at 03:03

## 2023-03-28 RX ADMIN — METOPROLOL TARTRATE 2.5 MG: 1 INJECTION, SOLUTION INTRAVENOUS at 07:03

## 2023-03-28 RX ADMIN — OXYCODONE AND ACETAMINOPHEN 1 TABLET: 5; 325 TABLET ORAL at 11:03

## 2023-03-28 RX ADMIN — ACETAMINOPHEN 1000 MG: 10 INJECTION, SOLUTION INTRAVENOUS at 03:03

## 2023-03-28 RX ADMIN — SODIUM CHLORIDE, SODIUM LACTATE, POTASSIUM CHLORIDE, AND CALCIUM CHLORIDE: .6; .31; .03; .02 INJECTION, SOLUTION INTRAVENOUS at 02:03

## 2023-03-28 RX ADMIN — DEXAMETHASONE SODIUM PHOSPHATE 8 MG: 4 INJECTION, SOLUTION INTRAMUSCULAR; INTRAVENOUS at 03:03

## 2023-03-28 RX ADMIN — SCOPOLAMINE 1 PATCH: 1 PATCH TRANSDERMAL at 02:03

## 2023-03-28 RX ADMIN — KETAMINE HYDROCHLORIDE 50 MG: 50 INJECTION INTRAMUSCULAR; INTRAVENOUS at 02:03

## 2023-03-28 RX ADMIN — MUPIROCIN 1 G: 20 OINTMENT TOPICAL at 10:03

## 2023-03-28 RX ADMIN — SODIUM CHLORIDE: 0.9 INJECTION, SOLUTION INTRAVENOUS at 03:03

## 2023-03-28 RX ADMIN — FENTANYL CITRATE 100 MCG: 50 INJECTION, SOLUTION INTRAMUSCULAR; INTRAVENOUS at 03:03

## 2023-03-28 RX ADMIN — LIDOCAINE HYDROCHLORIDE 100 MG: 20 INJECTION, SOLUTION INTRAVENOUS at 02:03

## 2023-03-28 RX ADMIN — HYDRALAZINE HYDROCHLORIDE 5 MG: 20 INJECTION INTRAMUSCULAR; INTRAVENOUS at 07:03

## 2023-03-28 RX ADMIN — HYDROMORPHONE HYDROCHLORIDE 1 MG: 1 INJECTION, SOLUTION INTRAMUSCULAR; INTRAVENOUS; SUBCUTANEOUS at 10:03

## 2023-03-28 RX ADMIN — DEXAMETHASONE SODIUM PHOSPHATE 2 MG: 4 INJECTION, SOLUTION INTRA-ARTICULAR; INTRALESIONAL; INTRAMUSCULAR; INTRAVENOUS; SOFT TISSUE at 11:03

## 2023-03-28 RX ADMIN — PROPOFOL 50 MCG/KG/MIN: 10 INJECTION, EMULSION INTRAVENOUS at 03:03

## 2023-03-28 RX ADMIN — MIDAZOLAM HYDROCHLORIDE 2 MG: 1 INJECTION, SOLUTION INTRAMUSCULAR; INTRAVENOUS at 02:03

## 2023-03-28 RX ADMIN — PROPOFOL 150 MCG/KG/MIN: 10 INJECTION, EMULSION INTRAVENOUS at 04:03

## 2023-03-28 RX ADMIN — CEFAZOLIN SODIUM 2 G: 2 SOLUTION INTRAVENOUS at 06:03

## 2023-03-28 NOTE — ANESTHESIA PROCEDURE NOTES
Intubation    Date/Time: 3/28/2023 2:54 PM  Performed by: Gloria Amin CRNA  Authorized by: Rodolfo Jensen MD     Intubation:     Induction:  Intravenous    Intubated:  Postinduction    Mask Ventilation:  Easy mask    Attempts:  1    Attempted By:  CRNA    Method of Intubation:  Video laryngoscopy    Blade:  Estes 3    Laryngeal View Grade: Grade IIb - only the arytenoids and epiglottis seen      Difficult Airway Encountered?: No      Complications:  None    Airway Device:  Oral endotracheal tube    Airway Device Size:  7.0    Style/Cuff Inflation:  Cuffed (inflated to minimal occlusive pressure)    Inflation Amount (mL):  9    Tube secured:  21    Secured at:  The lips    Placement Verified By:  Capnometry    Complicating Factors:  None  Notes:      Pt had thick secretions in mouth. Inline suction done with minimal return from ETT tube.

## 2023-03-28 NOTE — TRANSFER OF CARE
"Anesthesia Transfer of Care Note    Patient: Nichelle Barone    Procedure(s) Performed: Procedure(s) (LRB):  DISCECTOMY, SPINE, CERVICAL, ANTERIOR APPROACH, WITH FUSION (N/A)    Patient location: PACU    Anesthesia Type: general    Transport from OR: Transported from OR on room air with adequate spontaneous ventilation    Post pain: adequate analgesia    Post assessment: no apparent anesthetic complications    Post vital signs: stable    Level of consciousness: awake and alert    Nausea/Vomiting: no nausea/vomiting    Complications: none    Transfer of care protocol was followed      Last vitals:   Visit Vitals  /87   Pulse 85   Temp 36.6 °C (97.8 °F) (Oral)   Resp 18   Ht 5' 4" (1.626 m)   Wt 56.7 kg (125 lb)   SpO2 98%   Breastfeeding No   BMI 21.46 kg/m²     "

## 2023-03-28 NOTE — ANESTHESIA PREPROCEDURE EVALUATION
03/28/2023  Nichelle Barone is a 68 y.o., female.    Patient Active Problem List   Diagnosis    Fracture, femur    SLE (systemic lupus erythematosus)    Stenosis of cervical spine with myelopathy    Displacement of cervical intervertebral disc       Past Surgical History:   Procedure Laterality Date    HYSTERECTOMY      TONSILLECTOMY          Tobacco Use:  The patient  reports that she has been smoking cigarettes. She has a 13.75 pack-year smoking history. She has never used smokeless tobacco.     Results for orders placed or performed during the hospital encounter of 03/21/23   EKG 12-lead    Collection Time: 03/21/23  1:37 PM    Narrative    Test Reason : M48.02,Z01.818,    Vent. Rate : 073 BPM     Atrial Rate : 073 BPM     P-R Int : 174 ms          QRS Dur : 066 ms      QT Int : 424 ms       P-R-T Axes : 080 073 057 degrees     QTc Int : 467 ms    Normal sinus rhythm with sinus arrhythmia  Septal infarct ,age undetermined  Abnormal ECG  No previous ECGs available  Confirmed by Sharron SALINAS, Abel REID ([id number]) on 3/23/2023 10:41:26 AM    Referred By:             Confirmed By:Abel Mcdermott MD             Lab Results   Component Value Date    WBC 11.02 03/21/2023    HGB 14.5 03/21/2023    HCT 44.9 03/21/2023    MCV 94 03/21/2023     03/21/2023     BMP  Lab Results   Component Value Date     03/21/2023    K 3.8 03/21/2023    CL 98 03/21/2023    CO2 31 (H) 03/21/2023    BUN 10 03/21/2023    CREATININE 1.0 03/21/2023    CALCIUM 10.0 03/21/2023    ANIONGAP 12 03/21/2023    GLU 95 03/21/2023     08/05/2022       No results found for this or any previous visit.            Pre-op Assessment    I have reviewed the Patient Summary Reports.     I have reviewed the Nursing Notes. I have reviewed the NPO Status.   I have reviewed the Medications.     Review of Systems  Anesthesia Hx:  No problems  with previous Anesthesia  Denies Family Hx of Anesthesia complications.   Denies Personal Hx of Anesthesia complications.   Social:  Smoker    Hematology/Oncology:  Hematology Normal      Hematology Comments: History of pulmonary embolism 1990's   EENT/Dental:EENT/Dental Normal   Cardiovascular:   Hypertension, well controlled    Pulmonary:   COPD    Renal/:  Renal/ Normal     Hepatic/GI:   Hiatal Hernia, GERD, well controlled Hepatitis, C    Musculoskeletal:   Arthritis (osteoarthritis, Rheumatoid arthritis)     Neurological:  Neurology Normal Chronic neck pain and stiffness with occ Left arm, forearm, and hand numbness, burning and tingling   Endocrine:  Endocrine Normal    Psych:   Psychiatric History (psychosis)          Physical Exam  General: Well nourished    Airway:  Mallampati: II / II  Mouth Opening: Normal  TM Distance: Normal  Tongue: Normal  Neck ROM: Extension Decreased    Dental:  Periodontal disease    Chest/Lungs:  Clear to auscultation, Normal Respiratory Rate    Heart:  Rate: Normal  Rhythm: Regular Rhythm        Anesthesia Plan  Type of Anesthesia, risks & benefits discussed:    Anesthesia Type: Gen ETT  Intra-op Monitoring Plan: Standard ASA Monitors  Post Op Pain Control Plan: IV/PO Opioids PRN and multimodal analgesia  Induction:  IV  Airway Plan: Video  Informed Consent: Informed consent signed with the Patient and all parties understand the risks and agree with anesthesia plan.  All questions answered.   ASA Score: 3  Anesthesia Plan Notes:   GETA.  Neurontin 300 po, Scopolamine patch applied in holding  PIV x 2  A-line  IV tylenol  Zofran 4, Decadron (check with surgeon), Pepcid 20      Ready For Surgery From Anesthesia Perspective.     .

## 2023-03-28 NOTE — BRIEF OP NOTE
Date of procedure:  March 28, 2023     Preoperative diagnosis:    1. Cervical spinal stenosis, severe    2. Cervical radiculopathy    3. Cervical myelopathy    4. Cervical degenerative disc disease     5. Osteoporosis    6. COPD     7. Hypertension     Postoperative diagnosis:    1. Same    Procedures:    1. Anterior cervical diskectomy, decompression of spinal cord, bilateral foraminotomy C5-6, C6-7    2. Anterior cervical arthrodesis C5-6, C6-7    3. Insertion of biomechanical device C5-6 C6-7    4. Anterior cervical plating C5-C7     5. Harvesting local autograft, implantation of cell based allograft     6. Intraoperative neuro monitoring    Surgeon: Soto Heller D.O., MBA    Assistant: See medical record     Anesthesia:  General endotracheal     Estimated blood loss:  100 cc     Specimens:  None     Complications:  None    The patient tolerated the above procedure well.  She was extubated in the operating room and transferred to the recovery room in hemodynamically stable condition.  Neuro monitoring signals were stable throughout the procedure The patient's  was updated postoperatively.

## 2023-03-29 VITALS
DIASTOLIC BLOOD PRESSURE: 80 MMHG | RESPIRATION RATE: 18 BRPM | HEIGHT: 64 IN | BODY MASS INDEX: 20.97 KG/M2 | SYSTOLIC BLOOD PRESSURE: 173 MMHG | TEMPERATURE: 99 F | HEART RATE: 109 BPM | WEIGHT: 122.81 LBS | OXYGEN SATURATION: 93 %

## 2023-03-29 LAB
ANION GAP SERPL CALC-SCNC: 13 MMOL/L (ref 8–16)
BASOPHILS # BLD AUTO: 0.01 K/UL (ref 0–0.2)
BASOPHILS NFR BLD: 0.1 % (ref 0–1.9)
BUN SERPL-MCNC: 15 MG/DL (ref 8–23)
CALCIUM SERPL-MCNC: 9.1 MG/DL (ref 8.7–10.5)
CHLORIDE SERPL-SCNC: 104 MMOL/L (ref 95–110)
CO2 SERPL-SCNC: 22 MMOL/L (ref 23–29)
CREAT SERPL-MCNC: 1.2 MG/DL (ref 0.5–1.4)
DIFFERENTIAL METHOD: ABNORMAL
EOSINOPHIL # BLD AUTO: 0 K/UL (ref 0–0.5)
EOSINOPHIL NFR BLD: 0 % (ref 0–8)
ERYTHROCYTE [DISTWIDTH] IN BLOOD BY AUTOMATED COUNT: 13.4 % (ref 11.5–14.5)
EST. GFR  (NO RACE VARIABLE): 49.6 ML/MIN/1.73 M^2
GLUCOSE SERPL-MCNC: 140 MG/DL (ref 70–110)
HCT VFR BLD AUTO: 38.8 % (ref 37–48.5)
HGB BLD-MCNC: 13 G/DL (ref 12–16)
IMM GRANULOCYTES # BLD AUTO: 0.08 K/UL (ref 0–0.04)
IMM GRANULOCYTES NFR BLD AUTO: 0.5 % (ref 0–0.5)
LYMPHOCYTES # BLD AUTO: 0.8 K/UL (ref 1–4.8)
LYMPHOCYTES NFR BLD: 4.3 % (ref 18–48)
MCH RBC QN AUTO: 31 PG (ref 27–31)
MCHC RBC AUTO-ENTMCNC: 33.5 G/DL (ref 32–36)
MCV RBC AUTO: 92 FL (ref 82–98)
MONOCYTES # BLD AUTO: 0.4 K/UL (ref 0.3–1)
MONOCYTES NFR BLD: 2.5 % (ref 4–15)
NEUTROPHILS # BLD AUTO: 16.2 K/UL (ref 1.8–7.7)
NEUTROPHILS NFR BLD: 92.6 % (ref 38–73)
NRBC BLD-RTO: 0 /100 WBC
PLATELET # BLD AUTO: 214 K/UL (ref 150–450)
PMV BLD AUTO: 11 FL (ref 9.2–12.9)
POTASSIUM SERPL-SCNC: 3.9 MMOL/L (ref 3.5–5.1)
RBC # BLD AUTO: 4.2 M/UL (ref 4–5.4)
SODIUM SERPL-SCNC: 139 MMOL/L (ref 136–145)
WBC # BLD AUTO: 17.44 K/UL (ref 3.9–12.7)

## 2023-03-29 PROCEDURE — 97165 OT EVAL LOW COMPLEX 30 MIN: CPT

## 2023-03-29 PROCEDURE — 97535 SELF CARE MNGMENT TRAINING: CPT

## 2023-03-29 PROCEDURE — 63600175 PHARM REV CODE 636 W HCPCS: Performed by: NEUROLOGICAL SURGERY

## 2023-03-29 PROCEDURE — 25000003 PHARM REV CODE 250: Performed by: NEUROLOGICAL SURGERY

## 2023-03-29 PROCEDURE — 36415 COLL VENOUS BLD VENIPUNCTURE: CPT | Performed by: NEUROLOGICAL SURGERY

## 2023-03-29 PROCEDURE — 80048 BASIC METABOLIC PNL TOTAL CA: CPT | Performed by: NEUROLOGICAL SURGERY

## 2023-03-29 PROCEDURE — 97161 PT EVAL LOW COMPLEX 20 MIN: CPT

## 2023-03-29 PROCEDURE — 85025 COMPLETE CBC W/AUTO DIFF WBC: CPT | Performed by: NEUROLOGICAL SURGERY

## 2023-03-29 RX ORDER — AMOXICILLIN 250 MG
2 CAPSULE ORAL NIGHTLY PRN
Qty: 30 TABLET | Refills: 0 | Status: SHIPPED | OUTPATIENT
Start: 2023-03-29 | End: 2023-06-02

## 2023-03-29 RX ORDER — OXYCODONE AND ACETAMINOPHEN 5; 325 MG/1; MG/1
1 TABLET ORAL EVERY 4 HOURS PRN
Qty: 28 TABLET | Refills: 0 | Status: SHIPPED | OUTPATIENT
Start: 2023-03-29 | End: 2023-04-17 | Stop reason: SDUPTHER

## 2023-03-29 RX ORDER — TIZANIDINE 4 MG/1
4 TABLET ORAL EVERY 8 HOURS
Qty: 30 TABLET | Refills: 2 | Status: SHIPPED | OUTPATIENT
Start: 2023-03-29 | End: 2023-04-17

## 2023-03-29 RX ORDER — CEFADROXIL 500 MG/1
500 CAPSULE ORAL EVERY 12 HOURS
Qty: 14 CAPSULE | Refills: 0 | Status: SHIPPED | OUTPATIENT
Start: 2023-03-29 | End: 2023-04-19

## 2023-03-29 RX ADMIN — MUPIROCIN 1 G: 20 OINTMENT TOPICAL at 09:03

## 2023-03-29 RX ADMIN — DEXAMETHASONE SODIUM PHOSPHATE 2 MG: 4 INJECTION, SOLUTION INTRA-ARTICULAR; INTRALESIONAL; INTRAMUSCULAR; INTRAVENOUS; SOFT TISSUE at 05:03

## 2023-03-29 RX ADMIN — HYDROMORPHONE HYDROCHLORIDE 1 MG: 1 INJECTION, SOLUTION INTRAMUSCULAR; INTRAVENOUS; SUBCUTANEOUS at 08:03

## 2023-03-29 RX ADMIN — ONDANSETRON 8 MG: 4 TABLET, ORALLY DISINTEGRATING ORAL at 01:03

## 2023-03-29 RX ADMIN — HYDROMORPHONE HYDROCHLORIDE 1 MG: 1 INJECTION, SOLUTION INTRAMUSCULAR; INTRAVENOUS; SUBCUTANEOUS at 01:03

## 2023-03-29 RX ADMIN — OXYCODONE HYDROCHLORIDE 10 MG: 5 TABLET ORAL at 10:03

## 2023-03-29 RX ADMIN — CEFAZOLIN SODIUM 2 G: 2 SOLUTION INTRAVENOUS at 05:03

## 2023-03-29 NOTE — DISCHARGE SUMMARY
[facility]  Discharge Note  Short Stay    Procedure(s) (LRB):  DISCECTOMY, SPINE, CERVICAL, ANTERIOR APPROACH, WITH FUSION (N/A)      OUTCOME: Patient tolerated treatment/procedure well without complication and is now ready for discharge.    DISPOSITION: Home or Self Care    FINAL DIAGNOSIS:  Chronic obstructive pulmonary disease    FOLLOWUP: In clinic    DISCHARGE INSTRUCTIONS:  No discharge procedures on file.      Clinical Reference Documents Added to Patient Instructions         Document    INTERVERTEBRAL DISCECTOMY DISCHARGE INSTRUCTIONS (ENGLISH)    SPINAL STENOSIS DISCHARGE INSTRUCTIONS (ENGLISH)            TIME SPENT ON DISCHARGE: 20 minutes

## 2023-03-29 NOTE — NURSING
Patient given discharge instructions. Patient informed dr corral office will call with appt for d/c on ЮЛИЯ drain . Educated patient importance of keeping [place] collar on. Patient safety in car .

## 2023-03-29 NOTE — PT/OT/SLP EVAL
Occupational Therapy   Evaluation and Discharge Note    Name: Nichelle Barone  MRN: [id number]  Admitting Diagnosis: Chronic obstructive pulmonary disease  Recent Surgery: Procedure(s) (LRB):  DISCECTOMY, SPINE, CERVICAL, ANTERIOR APPROACH, WITH FUSION (N/A) 1 Day Post-Op    Recommendations:     Discharge Recommendations: home  Discharge Equipment Recommendations: none  Barriers to discharge:  None    Assessment:     Nichelle Barone is a 68 y.o. female with a medical diagnosis of Chronic obstructive pulmonary disease. At this time, patient is functioning at their prior level of function and does not require further acute OT services.     Plan:     During this hospitalization, patient does not require further acute OT services.  Please re-consult if situation changes.    Plan of Care Reviewed with: patient, spouse    Subjective     Chief Complaint: None  Patient/Family Comments/goals: To go home.    Occupational Profile:  Living Environment: lives with spouse on a boat.  Previous level of function: independent with ADLs, IADLs and driving.  Roles and Routines: primary homemaker  Equipment Used at home: none  Assistance upon Discharge: Spouse    Pain/Comfort:  Pain Rating 1: 8/10  Location - Orientation 1: anterior  Location 1: neck  Pain Rating Post-Intervention 1: 8/10    Patients cultural, spiritual, Shinto conflicts given the current situation: no    Objective:     Communicated with: nurse prior to session.  Patient found up in chair with telemetry, peripheral IV upon OT entry to room.    General Precautions: Standard,  (None)  Orthopedic Precautions: spinal precautions  Braces: [place] J collar  Respiratory Status: Room air     Occupational Performance:    Functional Mobility/Transfers:  Patient completed Sit <> Stand Transfer with stand by assistance  with  no assistive device   Functional Mobility: ambulated 10 feet in the hospital room with stand by assistance using no AD.    Activities of Daily Living:  Upper  Body Dressing: stand by assistance for spouse to don/doff [place]. Collar sitting in chair.    Cognitive/Visual Perceptual:  Cognitive/Psychosocial Skills:     -       Oriented to: Person, Place, Time, and Situation   -       Follows Commands/attention:Follows multistep  commands  -       Communication: clear/fluent  -       Memory: No Deficits noted  -       Safety awareness/insight to disability: intact   -       Mood/Affect/Coping skills/emotional control: Cooperative and Pleasant  Visual/Perceptual:      -Intact Acuity    Physical Exam:  Balance:    -       Sitting/Standing: Stand by Assist  Upper Extremity Range of Motion:     -       Right Upper Extremity: WFL  -       Left Upper Extremity: WFL  Upper Extremity Strength:    -       Right Upper Extremity: WFL  -       Left Upper Extremity: WFL   Strength:    -       Right Upper Extremity: WFL  -       Left Upper Extremity: WFL  Fine Motor Coordination:    -       Intact    AMPAC 6 Click ADL:  AMPAC Total Score: 19    Treatment & Education:  Patient and spouse educated on cervical spinal precautions, [place]. Collar wearing and cleaning schedule.    Patient left up in chair with all lines intact, call button in reach, and spouse present    GOALS:   Multidisciplinary Problems       Occupational Therapy Goals       Not on file                    History:     Past Medical History:   Diagnosis Date    Arthritis     Cancer     not sure    Coma 1990    ? after medication  x11 days    COPD (chronic obstructive pulmonary disease)     Decreased circulation     Dementia with psychosis     Endometriosis, unspecified     Fracture, femur     right    Fracture, foot     right    Heel fracture     left    Hiatal hernia with GERD     Hypertension     Pulmonary embolism     Respiratory distress     Rheumatoid arthritis, unspecified     Systemic lupus erythematosus, organ or system involvement unspecified     Unspecified osteoarthritis, unspecified site     Unspecified viral  hepatitis C without hepatic coma          Past Surgical History:   Procedure Laterality Date    ANTERIOR CERVICAL DISCECTOMY W/ FUSION N/A 3/28/2023    Procedure: DISCECTOMY, SPINE, CERVICAL, ANTERIOR APPROACH, WITH FUSION;  Surgeon: Soto Heller DO;  Location: [facility];  Service: Neurosurgery;  Laterality: N/A;  IOM notified 3/27 JG, C-ARM, MICRO, [employer] notified 3/27 JG    HYSTERECTOMY      TONSILLECTOMY         Time Tracking:     OT Date of Treatment: 03/29/23  OT Start Time: 1053  OT Stop Time: 1114  OT Total Time (min): 21 min    Billable Minutes:Evaluation 6  Self Care/Home Management 15    3/29/2023

## 2023-03-29 NOTE — CONSULTS
[facility]  Consult Note    Patient Name: Nichelle Barone  MRN: [id number]  Admission Date: 3/28/2023  Hospital Length of Stay: 0 days  Attending Physician: Soto Heller DO   Primary Care Provider: Primary Doctor No           Patient information was obtained from patient, spouse/SO, past medical records and ER records.     Consults  Subjective:     Principal Problem: Chronic obstructive pulmonary disease    Chief Complaint: No chief complaint on file.       HPI: This is a very pleasant 67-year-old woman presents with progressive, severe neck pain with radiation into the left arm, forearm and hand.  She describes burning, stabbing pain in the left C6 dermatome.  She endorses dropping objects with both hands.  She reports imbalance and notesa fall downstairs approximately 2 months ago.  She states she struck her head and had multiple teeth knocked out.  She denies loss of consciousness.  Her neck and left arm symptoms began shortly after the fall.  She notes a history of neck pain has undergone previous STELLA.  She reports that the most recent STELLA was not helpful.  In addition to the above, she also reports urinary urgency and wears diapers daily.     She is accompanied by her  who reports that she has mild dementia.  She is a retired .    Her  reports she has a history of rheumatoid arthritis and lupus.  They recently relocated to [place].  She has no PCP.  They live on a boat.     She notes that she will likely require extensive dental work     Smoking status:  Active.  Currently smokes 1 and half packs per day.  Notes previous 2-1/2-3 packs daily.  One hundred pack years      Past Medical History:   Diagnosis Date    Arthritis     Cancer     not sure    Coma 1990    ? after medication  x11 days    COPD (chronic obstructive pulmonary disease)     Decreased circulation     Dementia with psychosis     Endometriosis, unspecified     Fracture, femur      right    Fracture, foot     right    Heel fracture     left    Hiatal hernia with GERD     Hypertension     Pulmonary embolism     Respiratory distress     Rheumatoid arthritis, unspecified     Systemic lupus erythematosus, organ or system involvement unspecified     Unspecified osteoarthritis, unspecified site     Unspecified viral hepatitis C without hepatic coma        Past Surgical History:   Procedure Laterality Date    HYSTERECTOMY      TONSILLECTOMY         Review of patient's allergies indicates:   Allergen Reactions    Demerol [meperidine] Nausea And Vomiting    Penicillins Nausea And Vomiting and Rash    Sulfa (sulfonamide antibiotics) Rash and Blisters    Bactrim [sulfamethoxazole-trimethoprim]      Not work for infection    Chantix [varenicline] Other (See Comments)     Tongue bleed,  and bleeding ulcer    Keflex [cephalexin] Other (See Comments)     Works for day/or two, than infection back     Nicotine Dermatitis     Patch     Wellbutrin [bupropion hcl] Other (See Comments)     Tongue bleed, bleeding ulcer        No current facility-administered medications on file prior to encounter.     Current Outpatient Medications on File Prior to Encounter   Medication Sig    colesevelam (WELCHOL) 625 mg tablet Take by mouth.    HYDROcodone-acetaminophen (NORCO) 5-325 mg per tablet Take 1 tablet by mouth every 6 (six) hours as needed for Pain.    OLANZapine (ZYPREXA) 5 MG tablet every evening.    HYDROcodone-acetaminophen (NORCO) 5-325 mg per tablet Take 1 tablet by mouth every 6 (six) hours as needed for Pain.    HYDROcodone-acetaminophen (NORCO) 5-325 mg per tablet Take 1 tablet by mouth every 8 (eight) hours as needed for Pain.     Family History    None       Tobacco Use    Smoking status: Every Day     Packs/day: 0.25     Years: 55.00     Pack years: 13.75     Types: Cigarettes    Smokeless tobacco: Never    Tobacco comments:     3/21/23---instructed no smoking 24 hours before  sx   Substance and Sexual Activity    Alcohol use: Not Currently    Drug use: Never    Sexual activity: Not on file     Review of Systems   Constitutional: Negative.    HENT: Negative.     Eyes: Negative.    Respiratory: Negative.     Cardiovascular: Negative.    Gastrointestinal: Negative.    Endocrine: Negative.    Genitourinary: Negative.    Musculoskeletal: Negative.    Allergic/Immunologic: Negative.    Neurological: Negative.    Hematological: Negative.    Objective:     Vital Signs (Most Recent):  Temp: 98.4 °F (36.9 °C) (03/28/23 2131)  Pulse: 75 (03/28/23 2149)  Resp: 20 (03/28/23 2243)  BP: 136/76 (99) (03/28/23 2131)  SpO2: 97 % (03/28/23 2149) Vital Signs (24h Range):  Temp:  [97.5 °F (36.4 °C)-98.4 °F (36.9 °C)] 98.4 °F (36.9 °C)  Pulse:  [] 75  Resp:  [10-21] 20  SpO2:  [91 %-100 %] 97 %  BP: (136-217)/(55-96) 136/76     Weight: 56.7 kg (125 lb)  Body mass index is 21.46 kg/m².    Physical Exam  Vitals and nursing note reviewed. Exam conducted with a chaperone present.   HENT:      Head: Normocephalic and atraumatic.      Comments: C-collar in place  Eyes:      Pupils: Pupils are equal, round, and reactive to light.   Neck:      Comments: Patient complains of incisional pain  No evidence of bleeding or infection  Cardiovascular:      Rate and Rhythm: Normal rate and regular rhythm.   Pulmonary:      Effort: Pulmonary effort is normal.      Comments: Moderate air movement  No wheezes or crackles heard  Abdominal:      General: Bowel sounds are normal.   Skin:     General: Skin is warm and dry.      Capillary Refill: Capillary refill takes less than 2 seconds.   Neurological:      General: No focal deficit present.      Mental Status: She is alert.       Significant Labs: All pertinent labs within the past 24 hours have been reviewed.    Significant Imaging: I have reviewed all pertinent imaging results/findings within the past 24 hours.    Assessment/Plan:     * Chronic obstructive pulmonary  disease  Reason for consult  No current exacerbation  Nicotine replacement as needed  Incentive spirometry  Early ambulation when possible    Essential hypertension  Monitor and treat  Patient initially was on losartan  After that change patient maintain control without medication  Continue to monitor and treat      Chronic diarrhea  No diarrhea present  Complains of abdominal pain and diarrhea  No colonoscopy of note  Consider GI input as outpatient      Stenosis of cervical spine with myelopathy  Status postoperative intervention  Pain control      VTE Risk Mitigation (From admission, onward)    None              Thank you for your consult. I will follow-up with patient. Please contact us if you have any additional questions.    Marah Beatty MD  Department of Hospital Medicine   [facility]

## 2023-03-29 NOTE — CARE UPDATE
03/28/23 2149   Patient Assessment/Suction   Respiratory Effort Unlabored   Rhythm/Pattern, Respiratory pattern regular   PRE-TX-O2   Device (Oxygen Therapy) nasal cannula   Flow (L/min) 2   SpO2 97 %   Pulse Oximetry Type Continuous   $ Pulse Oximetry - Multiple Charge Pulse Oximetry - Multiple   Pulse 75   Resp 16   Respiratory Evaluation   $ Care Plan Tech Time 15 min   $ Eval/Re-eval Charges Evaluation   Evaluation For New Orders

## 2023-03-29 NOTE — PLAN OF CARE
Elevated BP overnight, now controlled.  Incision pain controlled.  Neuro stable.  Tolerating p.o. intake.  Ambulating.  Voiding without difficulty.  Afebrile vital signs stable.  Labs reviewed.  Postop x-rays stable.  Medically stable.  Appreciate Hospital Medicine input.    Plan:    Discharge today  My nurse will contact patient for drain removal in office tomorrow  After visit medications sent to patient's pharmacy  Activity, collar, wound care instructions provided  D/w nursing staff

## 2023-03-29 NOTE — OP NOTE
Date of procedure:  March 28, 2023     Preoperative diagnosis:    1. Cervical spinal stenosis, severe    2. Cervical radiculopathy    3. Cervical myelopathy    4. Cervical degenerative disc disease     5. Osteoporosis    6. COPD     7. Hypertension     Postoperative diagnosis:    1. Same    Procedures:    1. Anterior cervical diskectomy, decompression of spinal cord, bilateral foraminotomy C5-6, C6-7    2. Anterior cervical arthrodesis C5-6, C6-7    3. Insertion of biomechanical device C5-6 C6-7    4. Anterior cervical plating C5-C7     5. Harvesting local autograft, implantation of cell based allograft     6. Intraoperative neuro monitoring    Surgeon: Soto Heller D.O., MBA    Assistant: See medical record     Anesthesia:  General endotracheal     Estimated blood loss:  100 cc     Specimens:  None     Complications:  None      Operative procedure:     The patient was brought to the operating room where adequate IV access was obtained .  Upon anesthesia induction, the patient was gently endotracheally intubated using a glide scope while maintaining the cervical spine in neutral position.  A Yeh catheter and a left radial arterial pressure monitor were placed without difficulty.  Neuro monitoring leads were placed in the usual fashion, including erbs point bilaterally..  Pre positioning motor and somatosensory evoked potentials were established in all extremities.  The patient was then positioned supine on the operative table with the cervical spine gently extended.  Following surgical positioning, neuro monitoring potentials were unchanged.  All bony prominences were padded.  Eye goggles were applied. The shoulders were gently retracted inferiorly with surgical tape. The anterior cervical region was clipped prepped and draped in sterile fashion.  The C-arm was draped in sterile fashion and brought to the operative field.  Using intraoperative fluoroscopy the C5-6 and C6-7 disc spaces were identified  A  surgical time-out was performed.  The patient received IV antibiotics within 1 hour of incision.  Lidocaine with epinephrine was injected at the incision site.  A 10 blade scalpel was used to make a oblique incision based on the medial border of the right sternocleidomastoid muscle.  Self retaining retractors were placed.  Sharp dissection was carried along the medial border of the sternocleidomastoid muscle.  The carotid sheath was identified and maintained lateral to the plane of dissection.  Sharp dissection was continued  through the pretracheal and prevertebral fascia. The C5-6 and C6-7  disc spaces were confirmed with intraoperative fluoroscopy.  The longus coli muscles were elevated bilaterally with electrocautery.  Self retaining retractors were placed overlying C5-6 disc space.  [place] distracting pins were placed in the mid portion of C5 and mid portion of C5 bodies..  The microscope was draped and brought to the operative field.  The C5-6 disc was completely removed.  The posterior longitudinal ligament was elevated and resected in piecemeal fashion.  Bilateral foraminotomies were completed at C5-6. The spinal canal was inspected and adequately decompressed circumferentially.  Meticulous hemostasis was obtained.  A Medtronic titanium interbody device was sized and packed with cell based allograft.    The interbody device was then placed in the C5-6 interbody space with adequate positioning confirmed via fluoroscopy.  The retractor and distracting pins were then placed overlying the C6-7 disc and a complete diskectomy was again performed.  Endplate preparation was carried out with a high-speed bur.  The posterior longitudinal ligament was elevated and resected.  Bilateral foraminotomies at C6-7 were completed.  The neural elements were inspected with a ball ended probe for areas of compression at C6-7 and none was identified.  A Medtronic titanium interbody device was packed with autograft and allograft  and placed in the C6-7 disc space.  Fluoroscopy confirmed adequate positioning of the interbody device at C6-7.    A Medtronic Zevo anterior cervical plate was then sized, contoured, and afixed to the anterior spinal column extending from C5-C7 using self drilling screws.  Intermittent lateral and AP fluoroscopic images were utilized to ensure adequate implant positioning.  The wound was copiously irrigated.  Meticulous hemostasis was then again established.  The esophagus and vital neck structures were carefully inspected under the microscope with no evidence of iatrogenic injury.  A soft round silicone perforated drain was placed along with anterior spinal column and tunneled remote to the incision site and secured. The platysma was reapproximated with running 3-0 Vicryl suture.  Subcutaneous tissue was reapproximated with 2-0 Vicryl suture in interrupted fashion.  The dermis was reapproximated with running 4-0 Monocryl.  A sterile surgical dressing was applied.  A rigid cervical orthosis was then placed on the patient. The patient was extubated in the operating room and transferred to the PACU in stable condition. .  Sponge and needle counts were correct. Neuromonitoring potentials remained unchanged throughout the above procedures.   Detail Level: Zone Include Location In Plan?: No

## 2023-03-29 NOTE — ASSESSMENT & PLAN NOTE
Monitor and treat  Patient initially was on losartan  After that change patient maintain control without medication  Continue to monitor and treat

## 2023-03-29 NOTE — HOSPITAL COURSE
03/28/2023  The patient is a 68-year-old female, who underwent anterior fusion per Dr. Heller.  Consult hospitalist very inpatient medical management of COPD.  Her past medical history includes COPD hypertension inpatient admits to smoking.  Tobacco intake proximally 1 pack per day for greater than 20 years.  Per history patient has smoked 2-1/2 packs per day for 15 years.  History of mild dementia but currently doing well.   is at bedside and provides majority history.  Distant history of rheumatoid arthritis and lupus but no details.  She has not seen a primary care doctor in years.  She complains of chronic diarrhea her last colonoscopy greater than 7 years ago.    Plan to encourage incentive spirometry.  Nicotine replacement as needed.  Would consider Gastroenterology as outpatient    Patient complains of chest pain at the point for C-collar touchy chest bone.  Pain reproduced.  Appears to be chest wall pain due to C-collar.  Noncardiac

## 2023-03-29 NOTE — ASSESSMENT & PLAN NOTE
No diarrhea present  Complains of abdominal pain and diarrhea  No colonoscopy of note  Consider GI input as outpatient

## 2023-03-29 NOTE — PT/OT/SLP EVAL
Physical Therapy Evaluation and Discharge Note    Patient Name:  Nichelle Barone   MRN:  [id number]    Recommendations:     Discharge Recommendations:  Home  Discharge Equipment Recommendations: none   Barriers to discharge: None    Assessment:     Nichelle Barone is a 68 y.o. female admitted with a medical diagnosis of Chronic obstructive pulmonary disease. .  At this time, patient is functioning at her  prior level of function and does not require further acute PT services.     Recent Surgery: Procedure(s) (LRB):  DISCECTOMY, SPINE, CERVICAL, ANTERIOR APPROACH, WITH FUSION (N/A) 1 Day Post-Op    Plan:     During this hospitalization, patient does not require further acute PT services.  Please re-consult if situation changes.      Subjective     Chief Complaint: [place] J collar  Patient/Family Comments/goals: Home with spouse  Pain/Comfort:  Pain Rating 1: 6/10  Location 1: neck  Pain Addressed 1: Pre-medicate for activity    Patients cultural, spiritual, Uatsdin conflicts given the current situation:      Living Environment:  Pt lives with her  on  boat house   Prior to admission, patients level of function was ambulatory  w/o AD.  Equipment used at home: none.  DME owned (not currently used): none.  Upon discharge, patient will have assistance from her .    Objective:     Communicated with nurse prior to session.  Patient found up in chair with telemetry, peripheral IV upon PT entry to room.    General Precautions: Standard,      Orthopedic Precautions:    Braces: [place] J collar  Respiratory Status: Room air    Exams:  Cognitive Exam:  Patient is oriented to Person, Place, Time, and Situation  RLE ROM: WFL  RLE Strength: WFL  LLE ROM: WFL  LLE Strength: WFL    Functional Mobility:  Transfers:     Sit to Stand:  stand by assistance with no AD  Gait: 40 ft with close SBA    AM-PAC 6 CLICK MOBILITY  Total Score:        Treatment and Education: PT eval  and D/C close to baseline level of function    AM-PAC  6 CLICK MOBILITY  Total Score:      Patient left up in chair with all lines intact, call button in reach, and her  present.    GOALS:   Multidisciplinary Problems       Physical Therapy Goals       Not on file                    History:     Past Medical History:   Diagnosis Date    Arthritis     Cancer     not sure    Coma 1990    ? after medication  x11 days    COPD (chronic obstructive pulmonary disease)     Decreased circulation     Dementia with psychosis     Endometriosis, unspecified     Fracture, femur     right    Fracture, foot     right    Heel fracture     left    Hiatal hernia with GERD     Hypertension     Pulmonary embolism     Respiratory distress     Rheumatoid arthritis, unspecified     Systemic lupus erythematosus, organ or system involvement unspecified     Unspecified osteoarthritis, unspecified site     Unspecified viral hepatitis C without hepatic coma        Past Surgical History:   Procedure Laterality Date    ANTERIOR CERVICAL DISCECTOMY W/ FUSION N/A 3/28/2023    Procedure: DISCECTOMY, SPINE, CERVICAL, ANTERIOR APPROACH, WITH FUSION;  Surgeon: Soto Heller DO;  Location: [facility];  Service: Neurosurgery;  Laterality: N/A;  IOM notified 3/27 JG, C-ARM, MICRO, [employer] notified 3/27 JG    HYSTERECTOMY      TONSILLECTOMY         Time Tracking:     PT Received On: 03/29/23  PT Start Time: 0940     PT Stop Time: 0950  PT Total Time (min): 10 min     Billable Minutes: Evaluation 10 minutes      03/29/2023

## 2023-03-29 NOTE — ASSESSMENT & PLAN NOTE
Reason for consult  No current exacerbation  Nicotine replacement as needed  Incentive spirometry  Early ambulation when possible

## 2023-03-29 NOTE — PLAN OF CARE
03/29/23 1500   Final Note   Assessment Type Final Discharge Note   Anticipated Discharge Disposition Home   Hospital Resources/Appts/Education Provided Appointments scheduled by Navigator/Coordinator

## 2023-03-29 NOTE — PLAN OF CARE
[facility]  Initial Discharge Assessment       Primary Care Provider: Primary Doctor No    Admission Diagnosis: Cervical stenosis of spine [M48.02]  Stenosis of cervical spine with myelopathy [M48.02, G99.2]    Admission Date: 3/28/2023  Expected Discharge Date: 3/29/2023    Discharge Barriers Identified: None    Payor: MEDICARE / Plan: MEDICARE PART A & B / Product Type: Government /     Extended Emergency Contact Information  Primary Emergency Contact: michaela ling  Mobile Phone: [phone]  Relation: Spouse   needed? No    Discharge Plan A: Home  Discharge Plan B: Home with family      [facility] - [place] - [address]  [address]  [place] [address]  Phone: [phone] Fax: [phone]    SW met with patient at bedside to complete discharge planning assessment.  Patient alert and oriented xs 4.  Patient verified all demographic information on facesheet is correct.  Patient verified she has NO PCP.  Patient verified primary health insurance is Medicare and secondary is BCBS.  Patient with no financial issues at this time.  Patient family will provide transportation upon discharge from facility.  Patient independent with ADLs, live with spouse, drives self.      Initial Assessment (most recent)       Adult Discharge Assessment - 03/29/23 1056          Discharge Assessment    Assessment Type Discharge Planning Assessment     Confirmed/corrected address, phone number and insurance Yes     Confirmed Demographics Correct on Facesheet     Source of Information patient     Communicated LYNETTE with patient/caregiver Yes     People in Home spouse     Facility Arrived From: home     Do you expect to return to your current living situation? Yes     Do you have help at home or someone to help you manage your care at home? Yes     Who are your caregiver(s) and their phone number(s)? spouse     Prior to hospitilization cognitive status: Alert/Oriented     Current cognitive status:  Alert/Oriented     Readmission within 30 days? No     Patient currently being followed by outpatient case management? No     Do you currently have service(s) that help you manage your care at home? No     Do you take prescription medications? Yes     Do you have prescription coverage? Yes     Do you have any problems affording any of your prescribed medications? No     Is the patient taking medications as prescribed? yes     Who is going to help you get home at discharge? spouse     How do you get to doctors appointments? car, drives self     Are you on dialysis? No     Do you take coumadin? No     Discharge Plan A Home     Discharge Plan B Home with family     DME Needed Upon Discharge  none     Discharge Plan discussed with: Patient     Discharge Barriers Identified None

## 2023-03-29 NOTE — PROGRESS NOTES
03/28/23 2020   Handoff Report   Given To GASTON Dean       Nursing Transfer Note      3/28/2023     Reason patient is being transferred: Admission post surgery and recovery    Transfer To: [id number]    Transfer via bed    Transfer with 3 liters NC to O2    Transported by GASTON Poole and Liv RN     Medicines sent: None    Any special needs or follow-up needed: None    Chart send with patient: Yes    Notified: spouse    Patient reassessed at: 03/28/2023, 2020    Upon arrival to floor: cardiac monitor applied, patient oriented to room, call bell in reach, and bed in lowest position

## 2023-03-29 NOTE — DISCHARGE INSTRUCTIONS
Keep [place] collar on at all times.   May bath as long as incision is kept dry .   Nurse to remove ЮЛИЯ drain in office.

## 2023-03-29 NOTE — SUBJECTIVE & OBJECTIVE
Past Medical History:   Diagnosis Date    Arthritis     Cancer     not sure    Coma 1990    ? after medication  x11 days    COPD (chronic obstructive pulmonary disease)     Decreased circulation     Dementia with psychosis     Endometriosis, unspecified     Fracture, femur     right    Fracture, foot     right    Heel fracture     left    Hiatal hernia with GERD     Hypertension     Pulmonary embolism     Respiratory distress     Rheumatoid arthritis, unspecified     Systemic lupus erythematosus, organ or system involvement unspecified     Unspecified osteoarthritis, unspecified site     Unspecified viral hepatitis C without hepatic coma        Past Surgical History:   Procedure Laterality Date    HYSTERECTOMY      TONSILLECTOMY         Review of patient's allergies indicates:   Allergen Reactions    Demerol [meperidine] Nausea And Vomiting    Penicillins Nausea And Vomiting and Rash    Sulfa (sulfonamide antibiotics) Rash and Blisters    Bactrim [sulfamethoxazole-trimethoprim]      Not work for infection    Chantix [varenicline] Other (See Comments)     Tongue bleed,  and bleeding ulcer    Keflex [cephalexin] Other (See Comments)     Works for day/or two, than infection back     Nicotine Dermatitis     Patch     Wellbutrin [bupropion hcl] Other (See Comments)     Tongue bleed, bleeding ulcer        No current facility-administered medications on file prior to encounter.     Current Outpatient Medications on File Prior to Encounter   Medication Sig    colesevelam (WELCHOL) 625 mg tablet Take by mouth.    HYDROcodone-acetaminophen (NORCO) 5-325 mg per tablet Take 1 tablet by mouth every 6 (six) hours as needed for Pain.    OLANZapine (ZYPREXA) 5 MG tablet every evening.    HYDROcodone-acetaminophen (NORCO) 5-325 mg per tablet Take 1 tablet by mouth every 6 (six) hours as needed for Pain.    HYDROcodone-acetaminophen (NORCO) 5-325 mg per tablet Take 1 tablet by mouth every 8 (eight) hours as needed for Pain.      Family History    None       Tobacco Use    Smoking status: Every Day     Packs/day: 0.25     Years: 55.00     Pack years: 13.75     Types: Cigarettes    Smokeless tobacco: Never    Tobacco comments:     3/21/23---instructed no smoking 24 hours before sx   Substance and Sexual Activity    Alcohol use: Not Currently    Drug use: Never    Sexual activity: Not on file     Review of Systems   Constitutional: Negative.    HENT: Negative.     Eyes: Negative.    Respiratory: Negative.     Cardiovascular: Negative.    Gastrointestinal: Negative.    Endocrine: Negative.    Genitourinary: Negative.    Musculoskeletal: Negative.    Allergic/Immunologic: Negative.    Neurological: Negative.    Hematological: Negative.    Objective:     Vital Signs (Most Recent):  Temp: 98.4 °F (36.9 °C) (03/28/23 2131)  Pulse: 75 (03/28/23 2149)  Resp: 20 (03/28/23 2243)  BP: 136/76 (99) (03/28/23 2131)  SpO2: 97 % (03/28/23 2149) Vital Signs (24h Range):  Temp:  [97.5 °F (36.4 °C)-98.4 °F (36.9 °C)] 98.4 °F (36.9 °C)  Pulse:  [] 75  Resp:  [10-21] 20  SpO2:  [91 %-100 %] 97 %  BP: (136-217)/(55-96) 136/76     Weight: 56.7 kg (125 lb)  Body mass index is 21.46 kg/m².    Physical Exam  Vitals and nursing note reviewed. Exam conducted with a chaperone present.   HENT:      Head: Normocephalic and atraumatic.      Comments: C-collar in place  Eyes:      Pupils: Pupils are equal, round, and reactive to light.   Neck:      Comments: Patient complains of incisional pain  No evidence of bleeding or infection  Cardiovascular:      Rate and Rhythm: Normal rate and regular rhythm.   Pulmonary:      Effort: Pulmonary effort is normal.      Comments: Moderate air movement  No wheezes or crackles heard  Abdominal:      General: Bowel sounds are normal.   Skin:     General: Skin is warm and dry.      Capillary Refill: Capillary refill takes less than 2 seconds.   Neurological:      General: No focal deficit present.      Mental Status: She is  alert.       Significant Labs: All pertinent labs within the past 24 hours have been reviewed.    Significant Imaging: I have reviewed all pertinent imaging results/findings within the past 24 hours.

## 2023-03-29 NOTE — HPI
This is a very pleasant 67-year-old woman presents with progressive, severe neck pain with radiation into the left arm, forearm and hand.  She describes burning, stabbing pain in the left C6 dermatome.  She endorses dropping objects with both hands.  She reports imbalance and notesa fall downstairs approximately 2 months ago.  She states she struck her head and had multiple teeth knocked out.  She denies loss of consciousness.  Her neck and left arm symptoms began shortly after the fall.  She notes a history of neck pain has undergone previous STELLA.  She reports that the most recent STELLA was not helpful.  In addition to the above, she also reports urinary urgency and wears diapers daily.     She is accompanied by her  who reports that she has mild dementia.  She is a retired .    Her  reports she has a history of rheumatoid arthritis and lupus.  They recently relocated to [place].  She has no PCP.  They live on a boat.     She notes that she will likely require extensive dental work     Smoking status:  Active.  Currently smokes 1 and half packs per day.  Notes previous 2-1/2-3 packs daily.  One hundred pack years

## 2023-03-29 NOTE — ANESTHESIA POSTPROCEDURE EVALUATION
Anesthesia Post Evaluation    Patient: Nichelle Barone    Procedure(s) Performed: Procedure(s) (LRB):  DISCECTOMY, SPINE, CERVICAL, ANTERIOR APPROACH, WITH FUSION (N/A)    Final Anesthesia Type: general      Patient location during evaluation: PACU  Patient participation: Yes- Able to Participate  Level of consciousness: awake and alert  Post-procedure vital signs: reviewed and stable  Pain management: adequate  Airway patency: patent    PONV status at discharge: No PONV  Anesthetic complications: no      Cardiovascular status: blood pressure returned to baseline and stable  Respiratory status: unassisted and nasal cannula  Hydration status: euvolemic  Follow-up not needed.          Vitals Value Taken Time   /70 03/28/23 1945   Temp 36.6 °C (97.8 °F) 03/28/23 1930   Pulse 83 03/28/23 1957   Resp 16 03/28/23 1957   SpO2 99 % 03/28/23 1957   Vitals shown include unvalidated device data.      No case tracking events are documented in the log.      Pain/Alicia Score: Pain Rating Prior to Med Admin: 8 (3/28/2023  7:54 PM)  Alicia Score: 7 (3/28/2023  7:30 PM)

## 2023-03-30 ENCOUNTER — CLINICAL SUPPORT (OUTPATIENT)
Dept: NEUROSURGERY | Facility: CLINIC | Age: 68
End: 2023-03-30
Payer: MEDICARE

## 2023-03-30 DIAGNOSIS — Z98.1 STATUS POST CERVICAL SPINAL FUSION: Primary | ICD-10-CM

## 2023-03-30 DIAGNOSIS — Z98.890 POSTOPERATIVE STATE: Primary | ICD-10-CM

## 2023-03-30 DIAGNOSIS — M48.02 CERVICAL STENOSIS OF SPINE: ICD-10-CM

## 2023-03-30 PROCEDURE — 99024 POSTOP FOLLOW-UP VISIT: CPT | Mod: S$GLB,POP,, | Performed by: NEUROLOGICAL SURGERY

## 2023-03-30 PROCEDURE — 99024 PR POST-OP FOLLOW-UP VISIT: ICD-10-PCS | Mod: S$GLB,POP,, | Performed by: NEUROLOGICAL SURGERY

## 2023-03-30 RX ORDER — ONDANSETRON 4 MG/1
4 TABLET, ORALLY DISINTEGRATING ORAL EVERY 8 HOURS PRN
Qty: 15 TABLET | Refills: 0 | Status: SHIPPED | OUTPATIENT
Start: 2023-03-30 | End: 2023-04-19

## 2023-03-30 NOTE — PROGRESS NOTES
Pt presented to clinic for ЮЛИЯ drain removal with drain set to suction and 10ml serosanguinous fluid present. Suture removed and drain pulled with no resistance met. No drainage or bleeding noted. Clean and dry dressing applied. Pt tolerated well and ambulated from clinic without assistance.

## 2023-04-06 DIAGNOSIS — Z98.1 STATUS POST CERVICAL SPINAL FUSION: Primary | ICD-10-CM

## 2023-04-06 RX ORDER — OXYCODONE AND ACETAMINOPHEN 5; 325 MG/1; MG/1
1 TABLET ORAL EVERY 6 HOURS PRN
Qty: 28 TABLET | Refills: 0 | Status: SHIPPED | OUTPATIENT
Start: 2023-04-06 | End: 2023-04-13

## 2023-04-06 NOTE — TELEPHONE ENCOUNTER
----- Message from Stacey M Lefort sent at 4/6/2023 12:55 PM CDT -----  Pt is requesting a callback at [phone]. She needs pain meds. Thank you.

## 2023-04-06 NOTE — TELEPHONE ENCOUNTER
----- Message from Ann-Marie Lang sent at 4/6/2023  3:47 PM CDT -----  Contact: pt  Pt is returning your call and would like to speak with you   Please give pt a call back [phone]

## 2023-04-17 ENCOUNTER — OFFICE VISIT (OUTPATIENT)
Dept: NEUROSURGERY | Facility: CLINIC | Age: 68
End: 2023-04-17
Payer: MEDICARE

## 2023-04-17 ENCOUNTER — HOSPITAL ENCOUNTER (OUTPATIENT)
Dept: RADIOLOGY | Facility: HOSPITAL | Age: 68
Discharge: HOME | End: 2023-04-17
Attending: NEUROLOGICAL SURGERY
Payer: MEDICARE

## 2023-04-17 VITALS
HEART RATE: 98 BPM | HEIGHT: 64 IN | DIASTOLIC BLOOD PRESSURE: 95 MMHG | SYSTOLIC BLOOD PRESSURE: 150 MMHG | WEIGHT: 119.19 LBS | BODY MASS INDEX: 20.35 KG/M2

## 2023-04-17 DIAGNOSIS — Z98.1 STATUS POST CERVICAL SPINAL FUSION: ICD-10-CM

## 2023-04-17 DIAGNOSIS — M48.02 SPINAL STENOSIS, CERVICAL REGION: ICD-10-CM

## 2023-04-17 DIAGNOSIS — Z98.890 POSTOPERATIVE STATE: Primary | ICD-10-CM

## 2023-04-17 PROCEDURE — 99024 POSTOP FOLLOW-UP VISIT: CPT | Mod: S$GLB,POP,, | Performed by: NEUROLOGICAL SURGERY

## 2023-04-17 PROCEDURE — 72040 X-RAY EXAM NECK SPINE 2-3 VW: CPT | Mod: TC,FY

## 2023-04-17 PROCEDURE — 72040 XR CERVICAL SPINE AP LATERAL: ICD-10-PCS | Mod: 26,,, | Performed by: RADIOLOGY

## 2023-04-17 PROCEDURE — 99024 PR POST-OP FOLLOW-UP VISIT: ICD-10-PCS | Mod: S$GLB,POP,, | Performed by: NEUROLOGICAL SURGERY

## 2023-04-17 PROCEDURE — 72040 X-RAY EXAM NECK SPINE 2-3 VW: CPT | Mod: 26,,, | Performed by: RADIOLOGY

## 2023-04-17 RX ORDER — OXYCODONE AND ACETAMINOPHEN 5; 325 MG/1; MG/1
1 TABLET ORAL EVERY 6 HOURS PRN
Qty: 28 TABLET | Refills: 0 | Status: SHIPPED | OUTPATIENT
Start: 2023-04-17 | End: 2023-07-19

## 2023-04-17 NOTE — PROGRESS NOTES
Nichelle is status post uneventful ACDF C5-7 performed on March 20, 2023.  She reports she is doing well postoperatively.  She denies neck pain arm pain weakness numbness.  She endorses interscapular soreness which improves with analgesics and relaxants.  She denies dysphagia or dysphonia.  She denies incision related issues.  She denies fevers chills malaise.  She has started using a bone stimulator.  She continues to smoke.    Cervical spine x-rays obtained today were reviewed with the patient.  Implants stable without complication.  Alignment maintained.      Exam:    Awake, alert, oriented to person place and time.    Cranial nerves 2-12 grossly intact.    Strength is graded 5/5 in all muscle groups.    Sensation is grossly intact throughout.    Gait and stance are normal  Right anterior cervical incision well healed     Analysis: I advised her to continue wearing the cervical orthosis when ambulatory or when sleeping for the next 6 weeks.  Advised her to continue using his stimulator daily.  I refilled her pain medication.  She declines outpatient physical therapy.  We will see her back in 6 weeks with repeat x-rays.    Nichelle was seen today for follow-up.    Diagnoses and all orders for this visit:    Postoperative state  -     X-Ray Cervical Spine AP And Lateral; Future  -     oxyCODONE-acetaminophen (PERCOCET) 5-325 mg per tablet; Take 1 tablet by mouth every 6 (six) hours as needed (pain 4-5/10).    Status post cervical spinal fusion  -     oxyCODONE-acetaminophen (PERCOCET) 5-325 mg per tablet; Take 1 tablet by mouth every 6 (six) hours as needed (pain 4-5/10).

## 2023-04-19 ENCOUNTER — OFFICE VISIT (OUTPATIENT)
Dept: FAMILY MEDICINE | Facility: CLINIC | Age: 68
End: 2023-04-19
Payer: MEDICARE

## 2023-04-19 VITALS
WEIGHT: 124.56 LBS | HEART RATE: 104 BPM | SYSTOLIC BLOOD PRESSURE: 136 MMHG | HEIGHT: 64 IN | BODY MASS INDEX: 21.27 KG/M2 | OXYGEN SATURATION: 98 % | DIASTOLIC BLOOD PRESSURE: 92 MMHG

## 2023-04-19 DIAGNOSIS — Z87.891 PERSONAL HISTORY OF NICOTINE DEPENDENCE: ICD-10-CM

## 2023-04-19 DIAGNOSIS — F29 PSYCHOSIS, UNSPECIFIED PSYCHOSIS TYPE: ICD-10-CM

## 2023-04-19 DIAGNOSIS — Z72.0 NICOTINE ABUSE: ICD-10-CM

## 2023-04-19 DIAGNOSIS — I10 ESSENTIAL HYPERTENSION: Primary | ICD-10-CM

## 2023-04-19 DIAGNOSIS — F02.A4 MILD DEMENTIA ASSOCIATED WITH OTHER UNDERLYING DISEASE, WITH ANXIETY: ICD-10-CM

## 2023-04-19 PROBLEM — F03.A4 MILD DEMENTIA WITH ANXIETY: Status: ACTIVE | Noted: 2023-04-19

## 2023-04-19 PROCEDURE — 99999 PR PBB SHADOW E&M-EST. PATIENT-LVL III: CPT | Mod: PBBFAC,,, | Performed by: FAMILY MEDICINE

## 2023-04-19 PROCEDURE — 99214 PR OFFICE/OUTPT VISIT, EST, LEVL IV, 30-39 MIN: ICD-10-PCS | Mod: S$PBB,,, | Performed by: FAMILY MEDICINE

## 2023-04-19 PROCEDURE — 99213 OFFICE O/P EST LOW 20 MIN: CPT | Mod: PBBFAC,PO | Performed by: FAMILY MEDICINE

## 2023-04-19 PROCEDURE — 99999 PR PBB SHADOW E&M-EST. PATIENT-LVL III: ICD-10-PCS | Mod: PBBFAC,,, | Performed by: FAMILY MEDICINE

## 2023-04-19 PROCEDURE — 99214 OFFICE O/P EST MOD 30 MIN: CPT | Mod: S$PBB,,, | Performed by: FAMILY MEDICINE

## 2023-04-19 RX ORDER — OLANZAPINE 5 MG/1
5 TABLET ORAL NIGHTLY
Qty: 90 TABLET | Refills: 3 | Status: SHIPPED | OUTPATIENT
Start: 2023-04-19 | End: 2024-01-19 | Stop reason: SDUPTHER

## 2023-04-19 NOTE — PROGRESS NOTES
"Subjective:       Patient ID: Nichelle Barone is a 68 y.o. female.    Chief Complaint: Establish Care    Here today to establish care with a new PCP.   Here today with . She was recently seen in the clinic by KAREN Garcia for a pre-op clearance.  She then had a cervical discectomy on 3/28/23.  She had suffered a fall at home in Jan.  She saw the surgeon on Monday and is an a C-collar.  Overall, happy with results.    Immunizations: none on file  Last Lab work: 3/23  Colon Ca screening:  Colonoscopy 2015 ([place] - [place], [place])  Breast Ca Screening: [facility] 2015 - [place] ([place])  Cervical Ca Screening: no longer recommended    HTN:  She was on Benicar in the past.    Chronic Diarrhea:  She is on Welchol.  Seeing a functional medicine doctor  Dementia with psychosis:  Was hospitalized in Aug.  On Zypexa and doing very well.  Tobacco Use: down to 1/2 pack per day, was up to 3 packs/day, smoked since 15    Review of Systems   Constitutional:  Negative for activity change, appetite change, fatigue and fever.   Respiratory:  Negative for cough, shortness of breath and wheezing.    Cardiovascular:  Negative for chest pain and palpitations.   Gastrointestinal:  Negative for abdominal pain, constipation, diarrhea and nausea.   Skin:  Negative for pallor and rash.   Neurological:  Negative for dizziness, syncope, light-headedness and headaches.     Objective:      Vitals:    04/19/23 1414   BP: (!) 136/92   Pulse: 104   SpO2: 98%   Weight: 56.5 kg (124 lb 9 oz)   Height: 5' 4" (1.626 m)      Physical Exam  Constitutional:       General: She is not in acute distress.  Cardiovascular:      Rate and Rhythm: Normal rate and regular rhythm.      Heart sounds: Normal heart sounds. No murmur heard.  Pulmonary:      Effort: Pulmonary effort is normal. No respiratory distress.      Breath sounds: Normal breath sounds. No wheezing, rhonchi or rales.   Musculoskeletal:      Comments: In c-collar   Neurological:      General: No " focal deficit present.      Mental Status: She is alert.   Psychiatric:         Mood and Affect: Mood normal.         Behavior: Behavior normal.         Thought Content: Thought content normal.       Results for orders placed or performed during the hospital encounter of 03/28/23   CBC auto differential   Result Value Ref Range    WBC 17.44 (H) 3.90 - 12.70 K/uL    RBC 4.20 4.00 - 5.40 M/uL    Hemoglobin 13.0 12.0 - 16.0 g/dL    Hematocrit 38.8 37.0 - 48.5 %    MCV 92 82 - 98 fL    MCH 31.0 27.0 - 31.0 pg    MCHC 33.5 32.0 - 36.0 g/dL    RDW 13.4 11.5 - 14.5 %    Platelets 214 150 - 450 K/uL    MPV 11.0 9.2 - 12.9 fL    Immature Granulocytes 0.5 0.0 - 0.5 %    Gran # (ANC) 16.2 (H) 1.8 - 7.7 K/uL    Immature Grans (Abs) 0.08 (H) 0.00 - 0.04 K/uL    Lymph # 0.8 (L) 1.0 - 4.8 K/uL    Mono # 0.4 0.3 - 1.0 K/uL    Eos # 0.0 0.0 - 0.5 K/uL    Baso # 0.01 0.00 - 0.20 K/uL    nRBC 0 0 /100 WBC    Gran % 92.6 (H) 38.0 - 73.0 %    Lymph % 4.3 (L) 18.0 - 48.0 %    Mono % 2.5 (L) 4.0 - 15.0 %    Eosinophil % 0.0 0.0 - 8.0 %    Basophil % 0.1 0.0 - 1.9 %    Differential Method Automated    Basic metabolic panel   Result Value Ref Range    Sodium 139 136 - 145 mmol/L    Potassium 3.9 3.5 - 5.1 mmol/L    Chloride 104 95 - 110 mmol/L    CO2 22 (L) 23 - 29 mmol/L    Glucose 140 (H) 70 - 110 mg/dL    BUN 15 8 - 23 mg/dL    Creatinine 1.2 0.5 - 1.4 mg/dL    Calcium 9.1 8.7 - 10.5 mg/dL    Anion Gap 13 8 - 16 mmol/L    eGFR 49.6 (A) >60 mL/min/1.73 m^2      Assessment:       1. Essential hypertension    2. Systemic lupus erythematosus, unspecified SLE type, unspecified organ involvement status    3. Mild dementia associated with other underlying disease, with anxiety    4. Nicotine abuse    5. Personal history of nicotine dependence    6. Psychosis, unspecified psychosis type        Plan:       Essential hypertension  -     Comprehensive Metabolic Panel; Future; Expected date: 07/19/2023  -     Lipid Panel; Future; Expected date:  07/19/2023  BP mildly elevated.  Will work on lifestyle modifications as much as she can in the post-op period    Mild dementia associated with other underlying disease, with anxiety  -     OLANZapine (ZYPREXA) 5 MG tablet; Take 1 tablet (5 mg total) by mouth every evening.  Dispense: 90 tablet; Refill: 3  Much improved on Zyprexa.  Will continue at this time  Nicotine abuse  -     CT Chest Lung Screening Low Dose; Future; Expected date: 04/19/2023  Needs to quit smoking.  Will think about smoking cessation program.  Will proceed with CT lung.,  Personal history of nicotine dependence  -     CT Chest Lung Screening Low Dose; Future; Expected date: 04/19/2023    Psychosis, unspecified psychosis type  -     OLANZapine (ZYPREXA) 5 MG tablet; Take 1 tablet (5 mg total) by mouth every evening.  Dispense: 90 tablet; Refill: 3    Will try to get her old records for immunizations and preventative services.      Medication List with Changes/Refills   Current Medications    COLESEVELAM (WELCHOL) 625 MG TABLET    Take by mouth.    ENZYMES,DIGESTIVE (DIGESTIVE ENZYMES ORAL)    Take 1 tablet by mouth 2 (two) times a day.    ERGOCALCIFEROL (ERGOCALCIFEROL) 50,000 UNIT CAP    Take 50,000 Units by mouth every 7 days.    OXYCODONE-ACETAMINOPHEN (PERCOCET) 5-325 MG PER TABLET    Take 1 tablet by mouth every 6 (six) hours as needed (pain 4-5/10).    PYRIDOXINE, VITAMIN B6, (B-6) 50 MG TAB    Take 100 mg by mouth once daily.    SENNA-DOCUSATE 8.6-50 MG (PERICOLACE) 8.6-50 MG PER TABLET    Take 2 tablets by mouth nightly as needed for Constipation.    TIZANIDINE (ZANAFLEX) 4 MG TABLET    TAKE 1 TABLET (4 MG TOTAL) BY MOUTH EVERY 8 HOURS FOR 10 DAYS   Changed and/or Refilled Medications    Modified Medication Previous Medication    OLANZAPINE (ZYPREXA) 5 MG TABLET OLANZapine (ZYPREXA) 5 MG tablet       Take 1 tablet (5 mg total) by mouth every evening.    every evening.   Discontinued Medications    CEFADROXIL (DURICEF) 500 MG CAP     Take 1 capsule (500 mg total) by mouth every 12 (twelve) hours.    ONDANSETRON (ZOFRAN-ODT) 4 MG TBDL    Take 1 tablet (4 mg total) by mouth every 8 (eight) hours as needed.

## 2023-04-20 ENCOUNTER — PATIENT OUTREACH (OUTPATIENT)
Dept: ADMINISTRATIVE | Facility: HOSPITAL | Age: 68
End: 2023-04-20
Payer: MEDICARE

## 2023-04-26 RX ORDER — OXYCODONE AND ACETAMINOPHEN 5; 325 MG/1; MG/1
1 TABLET ORAL EVERY 6 HOURS PRN
Qty: 28 TABLET | Refills: 0 | Status: SHIPPED | OUTPATIENT
Start: 2023-04-26 | End: 2023-07-19 | Stop reason: SDUPTHER

## 2023-04-26 NOTE — TELEPHONE ENCOUNTER
Returned call to pt's spouse. Informed that refill has been sent to pharmacy on file. He voiced understanding.

## 2023-04-26 NOTE — TELEPHONE ENCOUNTER
----- Message from Stacey M Lefort sent at 4/26/2023 10:55 AM CDT -----  Pt's  Lito called to say that they need a refill on his wife's pain medication as she is going to be out as of tomorrow. He asked for a return call at [phone]. Thank you.

## 2023-04-27 ENCOUNTER — PATIENT MESSAGE (OUTPATIENT)
Dept: ADMINISTRATIVE | Facility: HOSPITAL | Age: 68
End: 2023-04-27
Payer: MEDICARE

## 2023-05-08 DIAGNOSIS — Z98.1 STATUS POST CERVICAL SPINAL FUSION: Primary | ICD-10-CM

## 2023-05-08 DIAGNOSIS — K52.9 CHRONIC DIARRHEA: Primary | ICD-10-CM

## 2023-05-08 RX ORDER — TIZANIDINE 4 MG/1
TABLET ORAL
Qty: 30 TABLET | Refills: 2 | OUTPATIENT
Start: 2023-05-08

## 2023-05-08 RX ORDER — OXYCODONE AND ACETAMINOPHEN 5; 325 MG/1; MG/1
1 TABLET ORAL EVERY 6 HOURS PRN
Qty: 28 TABLET | Refills: 0 | OUTPATIENT
Start: 2023-05-08

## 2023-05-08 RX ORDER — HYDROCODONE BITARTRATE AND ACETAMINOPHEN 10; 325 MG/1; MG/1
1 TABLET ORAL EVERY 6 HOURS PRN
Qty: 28 TABLET | Refills: 0 | Status: SHIPPED | OUTPATIENT
Start: 2023-05-08 | End: 2023-05-15

## 2023-05-08 NOTE — TELEPHONE ENCOUNTER
----- Message from Stacey M Lefort sent at 5/8/2023 10:23 AM CDT -----  Pt is requesting a refill on her pain medication. She would like a callback at [phone]. Thank you.

## 2023-05-08 NOTE — TELEPHONE ENCOUNTER
Contact pt's  and informed that pt should have refill of muscle relaxer at pharmacy. Also informed him that Dr. Heller placed a referral to GI for eval for chronic diarrhea. He voiced understanding.

## 2023-05-10 ENCOUNTER — TELEPHONE (OUTPATIENT)
Dept: NEUROSURGERY | Facility: CLINIC | Age: 68
End: 2023-05-10
Payer: MEDICARE

## 2023-05-10 NOTE — TELEPHONE ENCOUNTER
GI referral: spoke with , Lito. He is not ready to schedule because he wants to research physicians. States he will call when ready. Portal message sent with scheduling information.

## 2023-05-16 DIAGNOSIS — Z98.1 STATUS POST CERVICAL SPINAL FUSION: ICD-10-CM

## 2023-05-17 NOTE — TELEPHONE ENCOUNTER
----- Message from Stacey M Lefort sent at 5/17/2023 11:16 AM CDT -----  Pt is requesting a refill on her pain medication. She would like a callback at [phone]. Thank you.

## 2023-05-17 NOTE — TELEPHONE ENCOUNTER
Returned call to pt's  and informed that refill request will be sent to Dr. Heller for review/signature. Reminded him that we request a 24-48 hour notice for refills. He voiced understanding.

## 2023-05-18 DIAGNOSIS — Z98.1 STATUS POST CERVICAL SPINAL FUSION: Primary | ICD-10-CM

## 2023-05-18 RX ORDER — HYDROCODONE BITARTRATE AND ACETAMINOPHEN 7.5; 325 MG/1; MG/1
1 TABLET ORAL EVERY 6 HOURS PRN
Qty: 28 TABLET | Refills: 0 | Status: SHIPPED | OUTPATIENT
Start: 2023-05-18 | End: 2023-05-25

## 2023-05-18 RX ORDER — HYDROCODONE BITARTRATE AND ACETAMINOPHEN 10; 325 MG/1; MG/1
1 TABLET ORAL EVERY 6 HOURS PRN
Qty: 28 TABLET | Refills: 0 | OUTPATIENT
Start: 2023-05-18 | End: 2023-05-25

## 2023-05-18 NOTE — TELEPHONE ENCOUNTER
----- Message from Stacey M Lefort sent at 5/18/2023 10:57 AM CDT -----  Pt's  Lito wants to know why the pain medication is not going to be refilled. She is still in quite a bit of pain. He would like a callback at [phone]. Thank you.

## 2023-05-25 DIAGNOSIS — Z98.1 STATUS POST CERVICAL SPINAL FUSION: Primary | ICD-10-CM

## 2023-05-25 RX ORDER — HYDROCODONE BITARTRATE AND ACETAMINOPHEN 5; 325 MG/1; MG/1
1 TABLET ORAL EVERY 8 HOURS PRN
Qty: 21 TABLET | Refills: 0 | Status: SHIPPED | OUTPATIENT
Start: 2023-05-25 | End: 2023-06-02

## 2023-05-25 NOTE — TELEPHONE ENCOUNTER
Contacted pt's spouse and informed that refill request has been sent to Dr. Heller for review/signature. Also, reminded Mr. Barone that pt's medication will be changed and she should begin to take less. He voiced understanding.

## 2023-05-25 NOTE — TELEPHONE ENCOUNTER
----- Message from Stacey M Lefort sent at 5/25/2023 10:19 AM CDT -----  Pt is requesting a refill on her pain medication. She will be out of medicine by Saturday. The callback is [phone]. Thank you.

## 2023-05-30 ENCOUNTER — TELEPHONE (OUTPATIENT)
Dept: GASTROENTEROLOGY | Facility: CLINIC | Age: 68
End: 2023-05-30
Payer: MEDICARE

## 2023-05-30 NOTE — TELEPHONE ENCOUNTER
Spoke with pt's , Lito. Discussed next office visit with a doctor. Lito agreed to have pt seen by NP as he was explained clinic process. Appointment scheduled. Lito verbalized understanding

## 2023-05-30 NOTE — TELEPHONE ENCOUNTER
----- Message from Brooke Calles sent at 5/30/2023 12:41 PM CDT -----  Contact:  Ra  Type:  Sooner Appointment Request    Caller is requesting a sooner appointment.  Caller declined first available appointment listed below.      Name of Caller:  Patient  When is the first available appointment?  11/14  Symptoms:  Referral for chronic diarrhea, abdominal pain for a while./wants to see a physician   Best Call Back Number:  [phone]  Additional Information: Please call back to advise. Stated needs an early afternoon appt if possible.  Pt also has dementia psychosis, per . Thank You.

## 2023-05-31 ENCOUNTER — PATIENT MESSAGE (OUTPATIENT)
Dept: ADMINISTRATIVE | Facility: HOSPITAL | Age: 68
End: 2023-05-31
Payer: MEDICARE

## 2023-06-01 ENCOUNTER — HOSPITAL ENCOUNTER (OUTPATIENT)
Dept: RADIOLOGY | Facility: HOSPITAL | Age: 68
Discharge: HOME | End: 2023-06-01
Attending: NEUROLOGICAL SURGERY
Payer: MEDICARE

## 2023-06-01 ENCOUNTER — OFFICE VISIT (OUTPATIENT)
Dept: NEUROSURGERY | Facility: CLINIC | Age: 68
End: 2023-06-01
Payer: MEDICARE

## 2023-06-01 VITALS
SYSTOLIC BLOOD PRESSURE: 157 MMHG | HEIGHT: 64 IN | WEIGHT: 121.81 LBS | DIASTOLIC BLOOD PRESSURE: 94 MMHG | HEART RATE: 90 BPM | BODY MASS INDEX: 20.79 KG/M2

## 2023-06-01 DIAGNOSIS — Z98.1 STATUS POST CERVICAL SPINAL FUSION: Primary | ICD-10-CM

## 2023-06-01 DIAGNOSIS — Z98.890 POSTOPERATIVE STATE: ICD-10-CM

## 2023-06-01 PROCEDURE — 72040 X-RAY EXAM NECK SPINE 2-3 VW: CPT | Mod: TC,FY

## 2023-06-01 PROCEDURE — 99024 PR POST-OP FOLLOW-UP VISIT: ICD-10-PCS | Mod: POP,,, | Performed by: NEUROLOGICAL SURGERY

## 2023-06-01 PROCEDURE — 72040 XR CERVICAL SPINE AP LATERAL: ICD-10-PCS | Mod: 26,,, | Performed by: RADIOLOGY

## 2023-06-01 PROCEDURE — 99024 POSTOP FOLLOW-UP VISIT: CPT | Mod: POP,,, | Performed by: NEUROLOGICAL SURGERY

## 2023-06-01 PROCEDURE — 72040 X-RAY EXAM NECK SPINE 2-3 VW: CPT | Mod: 26,,, | Performed by: RADIOLOGY

## 2023-06-01 RX ORDER — HYDROCODONE BITARTRATE AND ACETAMINOPHEN 5; 325 MG/1; MG/1
1 TABLET ORAL EVERY 6 HOURS PRN
Qty: 21 TABLET | Refills: 0 | Status: SHIPPED | OUTPATIENT
Start: 2023-06-01 | End: 2023-07-19

## 2023-06-02 ENCOUNTER — OFFICE VISIT (OUTPATIENT)
Dept: GASTROENTEROLOGY | Facility: CLINIC | Age: 68
End: 2023-06-02
Payer: MEDICARE

## 2023-06-02 VITALS — HEIGHT: 63 IN | BODY MASS INDEX: 22.3 KG/M2 | WEIGHT: 125.88 LBS

## 2023-06-02 DIAGNOSIS — Z91.041 ALLERGY TO IODINATED CONTRAST: ICD-10-CM

## 2023-06-02 DIAGNOSIS — R13.14 PHARYNGOESOPHAGEAL DYSPHAGIA: ICD-10-CM

## 2023-06-02 DIAGNOSIS — R10.84 GENERALIZED ABDOMINAL PAIN: ICD-10-CM

## 2023-06-02 DIAGNOSIS — K52.9 CHRONIC DIARRHEA: Primary | ICD-10-CM

## 2023-06-02 DIAGNOSIS — Z87.898 HISTORY OF SHORTNESS OF BREATH: ICD-10-CM

## 2023-06-02 DIAGNOSIS — D72.829 LEUKOCYTOSIS, UNSPECIFIED TYPE: ICD-10-CM

## 2023-06-02 DIAGNOSIS — Z86.19 HISTORY OF HEPATITIS C: ICD-10-CM

## 2023-06-02 DIAGNOSIS — R11.0 NAUSEA: ICD-10-CM

## 2023-06-02 DIAGNOSIS — R19.8 STRAINING DURING BOWEL MOVEMENTS: ICD-10-CM

## 2023-06-02 DIAGNOSIS — Z83.79 FAMILY HISTORY OF CROHN'S DISEASE: ICD-10-CM

## 2023-06-02 PROCEDURE — 99999 PR PBB SHADOW E&M-EST. PATIENT-LVL III: CPT | Mod: PBBFAC,,, | Performed by: NURSE PRACTITIONER

## 2023-06-02 PROCEDURE — 99213 OFFICE O/P EST LOW 20 MIN: CPT | Mod: PBBFAC,PO | Performed by: NURSE PRACTITIONER

## 2023-06-02 PROCEDURE — 99214 OFFICE O/P EST MOD 30 MIN: CPT | Mod: S$PBB,,, | Performed by: NURSE PRACTITIONER

## 2023-06-02 PROCEDURE — 99999 PR PBB SHADOW E&M-EST. PATIENT-LVL III: ICD-10-PCS | Mod: PBBFAC,,, | Performed by: NURSE PRACTITIONER

## 2023-06-02 PROCEDURE — 99214 PR OFFICE/OUTPT VISIT, EST, LEVL IV, 30-39 MIN: ICD-10-PCS | Mod: S$PBB,,, | Performed by: NURSE PRACTITIONER

## 2023-06-02 RX ORDER — PREDNISONE 50 MG/1
50 TABLET ORAL
Qty: 3 TABLET | Refills: 0 | Status: SHIPPED | OUTPATIENT
Start: 2023-06-02 | End: 2023-06-03

## 2023-06-02 RX ORDER — DIPHENHYDRAMINE HCL 50 MG
50 CAPSULE ORAL
Refills: 0 | COMMUNITY
Start: 2023-06-02 | End: 2023-07-19

## 2023-06-02 RX ORDER — DOCUSATE SODIUM 100 MG/1
100 CAPSULE, LIQUID FILLED ORAL 3 TIMES DAILY PRN
COMMUNITY

## 2023-06-02 NOTE — PATIENT INSTRUCTIONS
Uncertain Causes of Diarrhea (Adult)    Diarrhea is when stools are loose and watery. This can be caused by:  Viral infections  Bacterial infections  Food poisoning  Parasites  Irritable bowel syndrome (IBS)  Inflammatory bowel diseases such as ulcerative colitis, Crohn's disease, and celiac disease  Food intolerance, such as to lactose, the sugar found in milk and milk products  Reaction to medicines like antibiotics, laxatives, cancer drugs, and antacids  Along with diarrhea, you may also have:  Abdominal pain and cramping  Nausea and vomiting  Loss of bowel control  Fever and chills  Bloody stools  In some cases, antibiotics may help to treat diarrhea. You may have a stool sample test. This is done to see what is causing your diarrhea, and if antibiotics will help treat it. The results of a stool sample test may take up to 2 days. The healthcare provider may not give you antibiotics until he or she has the stool test results.  Diarrhea can cause dehydration. This is the loss of too much water and other fluids from the body. When this occurs, body fluid must be replaced. This can be done with oral rehydration solutions. Oral rehydration solutions are available at drugstores and grocery stores without a prescription.  Home care  Follow all instructions given by your healthcare provider. Rest at home for the next 24 hours, or until you feel better. Avoid caffeine, tobacco, and alcohol. These can make diarrhea, cramping, and pain worse.  If taking medicines:  Dont take over-the-counter diarrhea or nausea medicines unless your healthcare provider tells you to.  You may use acetaminophen or NSAID medicines like ibuprofen or naproxen to reduce pain and fever. Dont use these if you have chronic liver or kidney disease, or ever had a stomach ulcer or gastrointestinal bleeding. Don't use NSAID medicines if you are already taking one for another condition (like arthritis) or are on daily aspirin therapy (such as for heart  disease or after a stroke). Talk with your healthcare provider first.  If antibiotics were prescribed, be sure you take them until they are finished. Dont stop taking them even when you feel better. Antibiotics must be taken as a full course.  To prevent the spread of illness:  Remember that washing with soap and water and using alcohol-based  is the best way to prevent the spread of infection.  Clean the toilet after each use.  Wash your hands before eating.  Wash your hands before and after preparing food. Keep in mind that people with diarrhea or vomiting should not prepare food for others.  Wash your hands after using cutting boards, countertops, and knives that have been in contact with raw foods.  Wash and then peel fruits and vegetables.  Keep uncooked meats away from cooked and ready-to-eat foods.  Use a food thermometer when cooking. Cook poultry to at least 165°F (74°C). Cook ground meat (beef, veal, pork, lamb) to at least 160°F (71°C). Cook fresh beef, veal, lamb, and pork to at least 145°F (63°C).  Dont eat raw or undercooked eggs (poached or ty side up), poultry, meat, or unpasteurized milk and juices.  Food and drinks  The main goal while treating vomiting or diarrhea is to prevent dehydration. This is done by taking small amounts of liquids often.  Keep in mind that liquids are more important than food right now.  Drink only small amounts of liquids at a time.  Dont force yourself to eat, especially if you are having cramping, vomiting, or diarrhea. Dont eat large amounts at a time, even if you are hungry.  If you eat, avoid fatty, greasy, spicy, or fried foods.  Dont eat dairy foods or drink milk if you have diarrhea. These can make diarrhea worse.  During the first 24 hours you can try:  Oral rehydration solutions. Do not use sports drinks. They have too much sugar and not enough electrolytes.  Soft drinks without caffeine  Ginger ale  Water (plain or flavored)  Decaf tea or  coffee  Clear broth, consommé, or bouillon  Gelatin, popsicles, or frozen fruit juice bars  The second 24 hours, if you are feeling better, you can add:  Hot cereal, plain toast, bread, rolls, or crackers  Plain noodles, rice, mashed potatoes, chicken noodle soup, or rice soup  Unsweetened canned fruit (no pineapple)  Bananas  As you recover:  Limit fat intake to less than 15 grams per day. Dont eat margarine, butter, oils, mayonnaise, sauces, gravies, fried foods, peanut butter, meat, poultry, or fish.  Limit fiber. Dont eat raw or cooked vegetables, fresh fruits except bananas, or bran cereals.  Limit caffeine and chocolate.  Limit dairy.  Dont use spices or seasonings except salt.  Go back to your normal diet over time, as you feel better and your symptoms improve.  If the symptoms come back, go back to a simple diet or clear liquids.  Follow-up care  Follow up with your healthcare provider, or as advised. If a stool sample was taken or cultures were done, call the healthcare provider for the results as instructed.  Call 911  Call 911 if you have any of these symptoms:  Trouble breathing  Confusion  Extreme drowsiness or trouble walking  Loss of consciousness  Rapid heart rate  Chest pain  Stiff neck  Seizure  When to seek medical advice  Call your healthcare provider right away if any of these occur:  Abdominal pain that gets worse  Constant lower right abdominal pain  Continued vomiting and inability to keep liquids down  Diarrhea more than 5 times a day  Blood in vomit or stool  Dark urine or no urine for 8 hours, dry mouth and tongue, tiredness, weakness, or dizziness  Drowsiness  New rash  You dont get better in 2 to 3 days  Fever of 100.4°F (38°C) or higher that doesnt get lower with medicine  Date Last Reviewed: 1/3/2016  © 4609-3632 [employer]. [address] [place] [address], [place] [address]. All rights reserved. This information is not intended as a substitute for professional medical care.  Always follow your healthcare professional's instructions.           Abdominal Pain    Abdominal pain is pain in the stomach or belly area. Everyone has this pain from time to time. In many cases it goes away on its own. But abdominal pain can sometimes be due to a serious problem, such as appendicitis. So its important to know when to seek help.  Causes of abdominal pain  There are many possible causes of abdominal pain. Common causes in adults include:  Constipation, diarrhea, or gas  Stomach acid flowing back up into the esophagus (acid reflux or heartburn)  Severe acid reflux, called GERD (gastroesophageal reflux disease)  A sore in the lining of the stomach or small intestine (peptic ulcer)  Inflammation of the gallbladder, liver, or pancreas  Gallstones or kidney stones  Appendicitis   Intestinal blockage   An internal organ pushing through a muscle or other tissue (hernia)  Urinary tract infections  In women, menstrual cramps, fibroids, or endometriosis  Inflammation or infection of the intestines  Diagnosing the cause of abdominal pain  Your healthcare provider will do a physical exam help find the cause of your pain. If needed, tests will be ordered. Belly pain has many possible causes. So it can be hard to find the reason for your pain. Giving details about your pain can help. Tell your provider where and when you feel the pain, and what makes it better or worse. Also let your provider know if you have other symptoms such as:  Fever  Tiredness  Upset stomach (nausea)  Vomiting  Changes in bathroom habits  Treating abdominal pain  Some causes of pain need emergency medical treatment right away. These include appendicitis or a bowel blockage. Other problems can be treated with rest, fluids, or medicines. Your healthcare provider can give you specific instructions for treatment or self-care based on what is causing your pain.  If you have vomiting or diarrhea, sip water or other clear fluids. When you are  ready to eat solid foods again, start with small amounts of easy-to-digest, low-fat foods. These include apple sauce, toast, or crackers.   When to seek medical care  Call 911 or go to the hospital right away if you:  Cant pass stool and are vomiting  Are vomiting blood or have bloody diarrhea or black, tarry diarrhea  Have chest, neck, or shoulder pain  Feel like you might pass out  Have pain in your shoulder blades with nausea  Have sudden, severe belly pain  Have new, severe pain unlike any you have felt before  Have a belly that is rigid, hard, and tender to touch  Call your healthcare provider if you have:  Pain for more than 5 days  Bloating for more than 2 days  Diarrhea for more than 5 days  A fever of 100.4°F (38.0°C) or higher, or as directed by your provider  Pain that gets worse  Weight loss for no reason  Continued lack of appetite  Blood in your stool  How to prevent abdominal pain  Here are some tips to help prevent abdominal pain:  Eat smaller amounts of food at one time.  Avoid greasy, fried, or other high-fat foods.  Avoid foods that give you gas.  Exercise regularly.  Drink plenty of fluids.  To help prevent GERD symptoms:  Quit smoking.  Reduce alcohol and certain foods that increase stomach acid.  Avoid aspirin and over-the-counter pain and fever medicines (NSAIDS or nonsteroidal anti-inflammatory drugs), if possible  Lose extra weight.  Finish eating at least 2 hours before you go to bed or lie down.  Raise the head of your bed.  Date Last Reviewed: 7/1/2016  © 6852-2447 [employer]. [address] [place] [address], [place] [address]. All rights reserved. This information is not intended as a substitute for professional medical care. Always follow your healthcare professional's instructions.

## 2023-06-02 NOTE — LETTER
June 2, 2023        Soto Heller [address]  [address]  [place] [address]             [facility]  [address] [place] [address]  Phone: [phone]   Patient: Nichelle Barone   MR Number: [id number]   YOB: 1955   Date of Visit: 6/2/2023       Dear Dr. Heller:    Thank you for referring Nichelle Barone to me for evaluation. Below are the relevant portions of my assessment and plan of care.            If you have questions, please do not hesitate to call me. I look forward to following Nichelle along with you.    Sincerely,      Brooke Hernandez, JUNE           CC  No Recipients

## 2023-06-02 NOTE — PROGRESS NOTES
Subjective:       Patient ID: Nichelle Barone is a 68 y.o. female Body mass index is 22.3 kg/m².    Chief Complaint: Diarrhea    This patient is new to me.  Referring Provider: Dr. Heller for chronic diarrhea.     Patient is here with family member, whom assisted with history. Patient reports at the beginning of visit that she refuses to have another colonoscopy. Patient in neck brace.  colace PRN which is TID, takes every time she takes oxycodone    GI Problem  The primary symptoms include abdominal pain, nausea (frequent) and diarrhea (CHIEF COMPLAINT). Primary symptoms do not include fever, weight loss, fatigue, vomiting, melena, hematemesis, hematochezia or dysuria.   The abdominal pain began more than 2 days ago (started ~2017). The abdominal pain is generalized (constant). The severity of the abdominal pain is 6/10 (currently).   The diarrhea began more than 1 week ago (started ~2017). The diarrhea is semi-solid and watery. The diarrhea occurs more than 10 times per day. Risk factors: recent hospitalization for neck surgery; had recent antibiotics.   The illness is also significant for dysphagia (started a few months ago with everytime she eats, went through the front of her neck for her neck surgery) and constipation (described as straining to pass since on pain medication). The illness does not include chills. Associated symptoms comments: TREATMENT: welchol 625 mg BID, digestive enzymes BID, colace TID with pain medication. Significant associated medical issues include GERD (occasional at night) and liver disease (history of hepatitis C- treated in the past).     Review of Systems   Constitutional:  Negative for appetite change, chills, fatigue, fever and weight loss.        Taking oxycodone TID & norco prn- not taking currently   HENT:  Positive for trouble swallowing. Negative for sore throat.    Respiratory:  Positive for shortness of breath (occasional, denies currently). Negative for cough and choking.     Cardiovascular:  Negative for chest pain.   Gastrointestinal:  Positive for abdominal pain, constipation (described as straining to pass since on pain medication), diarrhea (CHIEF COMPLAINT), dysphagia (started a few months ago with everytime she eats, went through the front of her neck for her neck surgery) and nausea (frequent). Negative for anal bleeding, blood in stool, hematemesis, hematochezia, melena, rectal pain and vomiting.   Genitourinary:  Negative for difficulty urinating, dysuria and flank pain.   Neurological:  Negative for weakness.       No LMP recorded. Patient has had a hysterectomy.  Past Medical History:   Diagnosis Date    Arthritis     Cancer     not sure    Coma 1990    ? after medication  x11 days    COPD (chronic obstructive pulmonary disease)     Decreased circulation     Dementia with psychosis     Endometriosis, unspecified     Fracture, femur     right    Fracture, foot     right    Heel fracture     left    Hiatal hernia with GERD     Hypertension     Pulmonary embolism     Respiratory distress     Rheumatoid arthritis, unspecified     Systemic lupus erythematosus, organ or system involvement unspecified     Unspecified osteoarthritis, unspecified site     Unspecified viral hepatitis C without hepatic coma      Past Surgical History:   Procedure Laterality Date    ANTERIOR CERVICAL DISCECTOMY W/ FUSION N/A 03/28/2023    Procedure: DISCECTOMY, SPINE, CERVICAL, ANTERIOR APPROACH, WITH FUSION;  Surgeon: Soto Heller DO;  Location: [facility];  Service: Neurosurgery;  Laterality: N/A;  [facility] notified 3/27 JG, [facility], [facility], [employer] notified 3/27 JG    COLONOSCOPY      prior to 2018, normal findings per patient report    HYSTERECTOMY      TONSILLECTOMY      UPPER GASTROINTESTINAL ENDOSCOPY      prior to 2018, normal findings per patient report     Family History   Problem Relation Age of Onset    Crohn's disease Mother     Colon cancer Neg Hx     Celiac disease Neg Hx     Esophageal  cancer Neg Hx     Stomach cancer Neg Hx     Ulcerative colitis Neg Hx      Social History     Tobacco Use    Smoking status: Every Day     Packs/day: 0.25     Years: 55.00     Pack years: 13.75     Types: Cigarettes    Smokeless tobacco: Never    Tobacco comments:     3/21/23---instructed no smoking 24 hours before sx   Substance Use Topics    Alcohol use: Not Currently    Drug use: Yes     Types: Hydrocodone, Oxycodone     Wt Readings from Last 10 Encounters:   06/02/23 57.1 kg (125 lb 14.1 oz)   06/01/23 55.3 kg (121 lb 12.9 oz)   04/19/23 56.5 kg (124 lb 9 oz)   04/17/23 54.1 kg (119 lb 2.5 oz)   03/29/23 55.7 kg (122 lb 12.7 oz)   03/21/23 56.7 kg (125 lb)   03/15/23 57.4 kg (126 lb 8.7 oz)   03/06/23 54.9 kg (120 lb 14.8 oz)   01/31/23 56.7 kg (125 lb)   01/20/23 56.7 kg (125 lb)     Lab Results   Component Value Date    WBC 17.44 (H) 03/29/2023    HGB 13.0 03/29/2023    HCT 38.8 03/29/2023    MCV 92 03/29/2023     03/29/2023     CMP  Sodium   Date Value Ref Range Status   03/29/2023 139 136 - 145 mmol/L Final     Potassium   Date Value Ref Range Status   03/29/2023 3.9 3.5 - 5.1 mmol/L Final     Chloride   Date Value Ref Range Status   03/29/2023 104 95 - 110 mmol/L Final     CO2   Date Value Ref Range Status   03/29/2023 22 (L) 23 - 29 mmol/L Final     Glucose   Date Value Ref Range Status   03/29/2023 140 (H) 70 - 110 mg/dL Final     BUN   Date Value Ref Range Status   03/29/2023 15 8 - 23 mg/dL Final     Creatinine   Date Value Ref Range Status   03/29/2023 1.2 0.5 - 1.4 mg/dL Final     Calcium   Date Value Ref Range Status   03/29/2023 9.1 8.7 - 10.5 mg/dL Final     Total Protein   Date Value Ref Range Status   08/05/2022 8.3 6.0 - 8.4 g/dL Final     Albumin   Date Value Ref Range Status   08/05/2022 4.4 3.5 - 5.2 g/dL Final     Total Bilirubin   Date Value Ref Range Status   08/05/2022 0.7 0.1 - 1.0 mg/dL Final     Comment:     For infants and newborns, interpretation of results should be based  on  gestational age, weight and in agreement with clinical  observations.    Premature Infant recommended reference ranges:  Up to 24 hours.............<8.0 mg/dL  Up to 48 hours............<12.0 mg/dL  3-5 days..................<15.0 mg/dL  6-29 days.................<15.0 mg/dL       Alkaline Phosphatase   Date Value Ref Range Status   08/05/2022 138 (H) 55 - 135 U/L Final     AST   Date Value Ref Range Status   08/05/2022 22 10 - 40 U/L Final     ALT   Date Value Ref Range Status   08/05/2022 25 10 - 44 U/L Final     Anion Gap   Date Value Ref Range Status   03/29/2023 13 8 - 16 mmol/L Final       Lab Results   Component Value Date    TSH 0.861 08/05/2022     Lab Results   Component Value Date    HEPCAB Positive (A) 08/05/2022     Reviewed some records in media from 2018 which showed colitis from an ER visit that indicated colitis.  Objective:      Physical Exam  Vitals and nursing note reviewed.   Constitutional:       General: She is not in acute distress.     Appearance: Normal appearance. She is well-developed. She is not diaphoretic.   HENT:      Mouth/Throat:      Lips: Pink. No lesions.      Mouth: Mucous membranes are moist. No oral lesions.      Tongue: No lesions.      Pharynx: Oropharynx is clear. No pharyngeal swelling or posterior oropharyngeal erythema.   Eyes:      General: No scleral icterus.     Conjunctiva/sclera: Conjunctivae normal.   Pulmonary:      Effort: Pulmonary effort is normal. No respiratory distress.      Breath sounds: Normal breath sounds. No wheezing.   Abdominal:      General: Bowel sounds are normal. There is no distension or abdominal bruit.      Palpations: Abdomen is soft. Abdomen is not rigid. There is no mass.      Tenderness: There is no abdominal tenderness. There is no guarding or rebound. Negative signs include Rees's sign and McBurney's sign.   Skin:     General: Skin is warm and dry.      Coloration: Skin is not jaundiced or pale.      Findings: No erythema or rash.    Neurological:      Mental Status: She is alert and oriented to person, place, and time.   Psychiatric:         Behavior: Behavior normal.         Thought Content: Thought content normal.         Judgment: Judgment normal.       Assessment:       1. Chronic diarrhea    2. Straining during bowel movements    3. Family history of Crohn's disease    4. Generalized abdominal pain    5. Nausea    6. Pharyngoesophageal dysphagia    7. History of hepatitis C    8. Leukocytosis, unspecified type    9. History of shortness of breath    10. Allergy to iodinated contrast        Plan:       Chronic diarrhea & Straining during bowel movements  -     Stool Exam-Ova,Cysts,Parasites; Future; Expected date: 06/02/2023  -     Fecal fat, qualitative; Future; Expected date: 06/02/2023  -     Giardia / Cryptosporidum, EIA; Future; Expected date: 06/02/2023  -     Occult blood x 1, stool; Future; Expected date: 06/02/2023  -     pH, stool; Future; Expected date: 06/02/2023  -     Rotavirus antigen, stool; Future; Expected date: 06/02/2023  -     WBC, Stool; Future; Expected date: 06/02/2023  -     Stool culture; Future; Expected date: 06/02/2023  -     Clostridium difficile EIA; Future; Expected date: 06/02/2023  -     Adenovirus Molecular Detection, PCR, Non-Blood Stool; Future; Expected date: 06/02/2023  -     Pancreatic elastase, fecal; Future; Expected date: 06/02/2023  -     IgA; Future; Expected date: 06/02/2023  -     Tissue Transglutaminase, IgA; Future; Expected date: 06/02/2023  -     CBC Without Differential; Future; Expected date: 06/02/2023  -     Calprotectin, Stool; Future; Expected date: 06/02/2023  -     Hepatic Function Panel; Future; Expected date: 06/02/2023  - discussed about Colonoscopy, including the risks and benefits of colonoscopy, patient verbalized understanding but patient declined colonoscopy.  - discontinue colace due to alternate therapy  - recommended increase fiber in diet, especially soluble fiber  since this can help bulk up the stool consistency and may help to slow down how fast the stool goes through the colon and can prevent diarrhea. Recommend high fiber diet (20-30 grams of fiber daily)/OTC fiber supplements daily as directed, such as Benefiber or Metamucil.  - CAN CONTINUE WELCHOL AS PRESCRIBED    Family history of Crohn's disease  -     CT Abdomen Pelvis With Contrast; Future; Expected date: 06/02/2023    Generalized abdominal pain  -     HEPATITIS C RNA, QUANTITATIVE, PCR; Future; Expected date: 06/02/2023  -     IgA; Future; Expected date: 06/02/2023  -     Tissue Transglutaminase, IgA; Future; Expected date: 06/02/2023  -     CBC Without Differential; Future; Expected date: 06/02/2023  -     Hepatic Function Panel; Future; Expected date: 06/02/2023  -     CT Abdomen Pelvis With Contrast; Future; Expected date: 06/02/2023  -     Hepatic Function Panel; Future; Expected date: 06/02/2023  - avoid/minimize use of NSAIDs- since they can cause GI upset, bleeding and/or ulcers. If NSAID must be taken, recommend take with food.    Nausea  -     HEPATITIS C RNA, QUANTITATIVE, PCR; Future; Expected date: 06/02/2023  -     IgA; Future; Expected date: 06/02/2023  -     Tissue Transglutaminase, IgA; Future; Expected date: 06/02/2023  -     CBC Without Differential; Future; Expected date: 06/02/2023  -     Hepatic Function Panel; Future; Expected date: 06/02/2023  -     Creatinine, Serum; Future; Expected date: 06/02/2023  -     CT Abdomen Pelvis With Contrast; Future; Expected date: 06/02/2023  - discussed with patient that a side effect of narcotic pain medications is nausea, advised patient to talk to provider who manages pain medication, patient verbalized understanding    Pharyngoesophageal dysphagia  - schedule EGD, discussed procedure with patient and possible esophageal dilation may be performed during procedure if indicated, patient verbalized understanding  - educated patient to eat smaller more  frequent meals and to eat slowly and advised to eat a soft diet.  - possible UGI/esophagram/esophageal manometry if symptoms persist    History of hepatitis C  -     HEPATITIS C RNA, QUANTITATIVE, PCR; Future; Expected date: 06/02/2023  -     Hepatic Function Panel; Future; Expected date: 06/02/2023  -     CT Abdomen Pelvis With Contrast; Future; Expected date: 06/02/2023    Leukocytosis, unspecified type  -     CBC Without Differential; Future; Expected date: 06/02/2023  - Recommend follow-up with Primary Care Provider for continued evaluation and management.    History of shortness of breath  - follow-up with PCP/pulmonology for continued evaluation and management ASAP  - if experiencing symptoms of headache, chest pain, severe/persistent shortness of breath, dizziness, and/or blurred vision, recommend going to ER for further evaluation and management    Allergy to iodinated contrast  - Patient requesting to do allergy protocol to have CT scan with contrast; reports she has done it in the past and did not have any adverse reactions  -     CT Abdomen Pelvis With Contrast; Future; Expected date: 06/02/2023  -  TAKE   diphenhydrAMINE (BENADRYL) 50 MG capsule; Take 1 capsule (50 mg total) by mouth On call Procedure for Itching or Allergies (take one hour prior to scan).; Refill: 0  - TAKE    predniSONE (DELTASONE) 50 MG Tab; Take 1 tablet (50 mg total) by mouth On call Procedure (CT scan). 1 tablet 13 hours prior to scan, 7 hours prior to scan, & 1 hours prior to scan.  Dispense: 3 tablet; Refill: 0    Follow up in about 1 month (around 7/2/2023), or if symptoms worsen or fail to improve.      If no improvement in symptoms or symptoms worsen, call/follow-up at clinic or go to ER.        45 minutes of total time spent on the encounter, which includes face to face time and non-face to face time preparing to see the patient (e.g., review of tests), Obtaining and/or reviewing separately obtained history, Documenting  clinical information in the electronic or other health record, Independently interpreting results (not separately reported) and communicating results to the patient/family/caregiver, or Care coordination (not separately reported).

## 2023-06-07 ENCOUNTER — CLINICAL SUPPORT (OUTPATIENT)
Dept: REHABILITATION | Facility: HOSPITAL | Age: 68
End: 2023-06-07
Attending: NEUROLOGICAL SURGERY
Payer: MEDICARE

## 2023-06-07 DIAGNOSIS — Z98.1 STATUS POST CERVICAL SPINAL FUSION: ICD-10-CM

## 2023-06-07 PROCEDURE — 97162 PT EVAL MOD COMPLEX 30 MIN: CPT | Mod: PN

## 2023-06-07 PROCEDURE — 97110 THERAPEUTIC EXERCISES: CPT | Mod: PN

## 2023-06-08 DIAGNOSIS — Z98.1 STATUS POST CERVICAL SPINAL FUSION: Primary | ICD-10-CM

## 2023-06-08 RX ORDER — TIZANIDINE 4 MG/1
4 TABLET ORAL EVERY 8 HOURS
Qty: 30 TABLET | Refills: 2 | Status: SHIPPED | OUTPATIENT
Start: 2023-06-08 | End: 2023-06-28

## 2023-06-08 RX ORDER — HYDROCODONE BITARTRATE AND ACETAMINOPHEN 5; 325 MG/1; MG/1
1 TABLET ORAL EVERY 8 HOURS PRN
Qty: 21 TABLET | Refills: 0 | Status: SHIPPED | OUTPATIENT
Start: 2023-06-08 | End: 2023-06-15

## 2023-06-08 NOTE — TELEPHONE ENCOUNTER
----- Message from Stacey M Lefort sent at 6/8/2023 10:15 AM CDT -----  Pt is requesting a refill on her pain medication. Also, requested a callback at [phone]. Thank you.

## 2023-06-08 NOTE — TELEPHONE ENCOUNTER
Returned call to pt's spouse. Informed refill request has been sent to Dr. Heller to review/sign. Informed that pt is over 3 months postoperative and this will be the last [facility] refill. Dr. Heller can refill the muscle relaxer as needed until next follow up appt. Pt's spouse voiced understanding.

## 2023-06-08 NOTE — PLAN OF CARE
[facility]  Physical Therapy Initial Evaluation    Name: Nichelle Barone  [facility] Number: [id number]    Therapy Diagnosis:   Encounter Diagnosis   Name Primary?    Status post cervical spinal fusion      Physician: Soto Heller DO    Physician Orders: PT Eval and Treat gait training  Medical Diagnosis from Referral: Status post cervical spinal fusion  Evaluation Date: 6/7/2023  Authorization Period Expiration: 5/31/24  Plan of Care Expiration: 8/4/23  Visit # / Visits authorized: 1/ 1    Time In: 1:00  Time Out: 1:50  Total Billable Time: 50 minutes    Precautions: Standard, osteoporosis, dementia    s/p Procedures: (3/28/23)  1. Anterior cervical diskectomy, decompression of spinal cord, bilateral foraminotomy C5-6, C6-7  2. Anterior cervical arthrodesis C5-6, C6-7  3. Insertion of biomechanical device C5-6 C6-7  4. Anterior cervical plating C5-C7   5. Harvesting local autograft, implantation of cell based allograft   6. Intraoperative neuro monitoring    Subjective   Date of onset: s/p ACDF at C5-6 and C6-7 on 3/28/23  History of current condition - Nichelle reports: she fell on 5/31/23 when she had a panic attack on the stairs.  She states she was able to get back up on her own.  She states she has fainting spells.  She states her pain to neck, B arms, and hands.  She states her pain intensity has decreased since ACDF.    Pt states she is off balance.  Pt states she walks for exercise on the dock.     Medical History:   Past Medical History:   Diagnosis Date    Arthritis     Cancer     not sure    Coma 1990    ? 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    Medications:   Nichelle has a current medication list which includes the following prescription(s): colesevelam, diphenhydramine, docusate sodium, enzymes,digestive, ergocalciferol, hydrocodone-acetaminophen, olanzapine, oxycodone-acetaminophen, oxycodone-acetaminophen, pyridoxine (vitamin b6), and tizanidine.    Allergies:   Review of patient's allergies indicates:   Allergen Reactions    Demerol [meperidine] Nausea And Vomiting    Penicillins Nausea And Vomiting and Rash    Sulfa (sulfonamide antibiotics) Rash and Blisters    Bactrim [sulfamethoxazole-trimethoprim]      Not work for infection    Chantix [varenicline] Other (See Comments)     Tongue bleed,  and bleeding ulcer    Iodinated contrast media Itching     No swelling or rash; did good with allergy protocol    Keflex [cephalexin] Other (See Comments)     Works for day/or two, than infection back     Nicotine Dermatitis     Patch     Wellbutrin [bupropion hcl] Other (See Comments)     Tongue bleed, bleeding ulcer         Imaging, xray: 6/1/23: The patient is seen to be status post anterior fusion of C5-6 and 7 with intervertebral disc spaces at C5-6 and C6-7.  The appearance of the cervical spine is similar to what was seen on 04/17/2023.    Prior Therapy: PT 25 yrs ago  Social History: pt lives with their spouse in a house boat, 4 rails with R rail  Occupation: retired  Prior Level of Function: I with ADLs but painful  Current Level of Function: difficulty with fwd bending, donning shoes, getting on/off boat    Pt reports daily headaches since February after she fell  and hit her head.  She has a spiral stairway in house boat.  She fell down the stairs and hit her head and knocked her teeth out on the toilet.    Pt states she wakes up with pain to neck 5x/night.    Pain:  Current 8/10, worst 9/10, best 7/10   Location:  R side of head and down back of neck  Description: Sharp  Aggravating Factors: fwd bending  Easing Factors: side lying, pain meds, muscle relaxer    Pts goals: be able to exercise more, be able to paint on the back of the boat and scrub ceilings    Objective     Observation: pt is pleasant and cooperative, here today with , Lito    Posture: fwd head, rounded shoulders    Cervical Range of Motion:    Degrees Pain   Flexion 33 no     Extension 30 no     Right Rotation 55 no     Left Rotation 33 Pain on L side of neck     Right Side Bending 12 Pain on L side of neck   Left Side Bending 20 Pulling on L      Shoulder Range of Motion:   Shoulder Right Left   Flexion WFL WFL   Abduction WFL WFL   ER WFL WFL   IR T10 T7     Strength:  Cervical MMT   Flexion 4-/5   Extension 4/5   Right Side Bend 4/5   Left Side Bend 4/5     Upper Extremity Strength  (R) UE  (L) UE    Shoulder flexion: 4+/5 Shoulder flexion: 5/5   Shoulder Abduction: 4/5 Shoulder abduction: 4+/5   Shoulder ER 3+/5 Shoulder ER 4/5   Shoulder IR 4/5 Shoulder IR 4/5   Elbow flexion: 4/5 Elbow flexion: 4/5   Elbow extension: 4+/5 Elbow extension: 4+/5      strength (Patel, L2, avg of 3 trials in lbs.)  R: 55, 45, 50  L: 40, 45, 45    Palpation: TTP and tightness to B cervical paraspinals, B UT, B levator scaps      Sensation: intact to light touch to B UE    Flexibility: decreased upper trap and levator scap flexibility      CMS Impairment/Limitation/Restriction for FOTO Neck Survey  Status Limitation G-Code CMS Severity Modifier  Intake 36% 64% Current Status CL - At least 60 percent but less than 80 percent  Predicted 46% 54% Goal Status+ CK - At least 40 percent but less than 60 percent        TREATMENT   Treatment Time In: 1:35  Treatment Time Out: 1:45  Total Treatment time separate from Evaluation: 10 minutes    Nichelle received therapeutic exercises to develop strength, ROM, and flexibility for 10 minutes including:  Seated Gentle cervical AROM rotation x10, 5 sec hold  Seated cervical isometrics (flex, SB) x10 5 sec hold  Supine cervical AROM rotation x10, 5 sec hold  Gripping Y and B theraputty    May add: manual, scap retraction, posterior sh rolls, corner pec stretch, bruegger's    Home Exercises and Patient Education Provided    Education provided:   - role of PT  -perform ex in pain free ROM  -self scar massage  Pt gave verbal understanding to all education provided     Written Home Exercises Provided: yes.  Exercises were reviewed and Nichelle was able to demonstrate them prior to the end of the session.  Nichelle demonstrated good  understanding of the education provided.     See EMR under Patient Instructions for exercises provided 6/7/23.    Assessment   Nichelle is a 68 y.o. female referred to outpatient Physical Therapy with a medical diagnosis of Status post cervical spinal fusion. Pt presents with neck pain, cervical muscle tightness, decreased cervical ROM, decreased UE strength.  Pt's , Lito present throughout session.  Pt has history of dementia.  She will benefit from pain free cervical ROM, increased cervical and postural strength.    Pt prognosis is Good.   Pt will benefit from skilled outpatient Physical Therapy to address the deficits stated above and in the chart below, provide pt/family education, and to maximize pt's level of independence.     Plan of care discussed with patient: Yes  Pt's spiritual, cultural and educational needs considered and patient is agreeable to the plan of care and goals as stated below:     Anticipated Barriers for therapy: pt with dementia    Medical Necessity is demonstrated by the following  History  Co-morbidities and personal factors that may  impact the plan of care [] LOW: no personal factors / co-morbidities  [] MODERATE: 1-2 personal factors / co-morbidities  [x] HIGH: 3+ personal factors / co-morbidities    Moderate / High Support Documentation:   Co-morbidities affecting plan of care: COPD, dementia, HTN    Personal Factors:   lifestyle     Examination  Body Structures and Functions, activity limitations and participation restrictions that may impact the plan of care [] LOW: addressing 1-2 elements  [] MODERATE: 3+ elements  [x] HIGH: 4+ elements (please support below)    Moderate / High Support Documentation: cervical ROM, cervical strength, UE strength, decreased balance, difficulty with grooming     Clinical Presentation [] LOW: stable  [x] MODERATE: Evolving  [] HIGH: Unstable     Decision Making/ Complexity Score: moderate         GOALS: Short Term Goals: 4 weeks (progressing, not met)  1.Report decreased neck pain  <   / =  5  /10  to increase tolerance for ADLs  2. Increased L cervical rotation to at least 45 deg for increased ease with ADLs.   3. Increased R UE strength by 1/3 muscle grade in all deficient planes to increase tolerance for ADLs.  4.  Increased L UE strength by 1/3 muscle grade in all deficient planes to increase tolerance for ADLs.  5. Pt to tolerate HEP to improve ROM and independence with ADL's  6. Increased cervical R side bending to at least 20 deg for increased ease with ADLs.  7. Increased cervical muscle strength by 1/3 muscle grade in all deficient planes for increased ease with ADLs.    Long Term Goals: 8 weeks (progressing, not met)  1.Report decreased neck pain  <   / =  3  /10  to increase tolerance for ADLs.  2.increased cervical L rotation AROM to at least 55 deg for increased ease with ADLs.  3.Increased R UE strength by 1 muscle grade in all deficient planes  to increase tolerance for ADL and work activities.  4. Increased L UE strength by 1 muscle grade in all deficient planes  to increase tolerance for ADL  and work activities.  5. Pt to be Independent with HEP to improve ROM and independence with ADL's  6. Increased cervical muscle strength by 1 muscle grade in all deficient planes for increased ease with ADLs.        Plan   Plan of care Certification: 6/7/2023 to 8/4/23.    Outpatient Physical Therapy 2 times weekly for 8 weeks to include the following interventions: Electrical Stimulation  , Gait Training, Manual Therapy, Moist Heat/ Ice, Neuromuscular Re-ed, Paraffin, Patient Education, Self Care, Therapeutic Activities, Therapeutic Exercise, and dry needling.     Marlyn Ozuna, PT

## 2023-06-12 ENCOUNTER — HOSPITAL ENCOUNTER (OUTPATIENT)
Dept: RADIOLOGY | Facility: HOSPITAL | Age: 68
Discharge: HOME | End: 2023-06-12
Attending: NURSE PRACTITIONER
Payer: MEDICARE

## 2023-06-12 DIAGNOSIS — Z91.041 ALLERGY TO IODINATED CONTRAST: ICD-10-CM

## 2023-06-12 DIAGNOSIS — R10.84 GENERALIZED ABDOMINAL PAIN: ICD-10-CM

## 2023-06-12 PROCEDURE — 74176 CT ABD & PELVIS W/O CONTRAST: CPT | Mod: 26,,, | Performed by: RADIOLOGY

## 2023-06-12 PROCEDURE — 74176 CT ABDOMEN PELVIS WITHOUT CONTRAST: ICD-10-PCS | Mod: 26,,, | Performed by: RADIOLOGY

## 2023-06-12 PROCEDURE — 25500020 PHARM REV CODE 255: Mod: PO | Performed by: NURSE PRACTITIONER

## 2023-06-12 PROCEDURE — 74176 CT ABD & PELVIS W/O CONTRAST: CPT | Mod: TC,PO

## 2023-06-12 PROCEDURE — A9698 NON-RAD CONTRAST MATERIALNOC: HCPCS | Mod: PO | Performed by: NURSE PRACTITIONER

## 2023-06-12 RX ADMIN — BARIUM SULFATE 900 ML: 20 SUSPENSION ORAL at 12:06

## 2023-06-13 NOTE — PROGRESS NOTES
[facility]   Physical Therapy Treatment Note      Name: Nichelle Barone  [facility] Number: [id number]    Therapy Diagnosis:   Encounter Diagnoses   Name Primary?    Neck pain     Decreased range of motion     Muscle weakness     Decreased functional mobility      Physician: Soto Heller DO    Visit Date: 6/14/2023    Physician Orders: PT Eval and Treat gait training  Medical Diagnosis from Referral: Status post cervical spinal fusion  Evaluation Date: 6/7/2023  Authorization Period Expiration: 5/31/24  Plan of Care Expiration: 8/4/23  Visit # / Visits authorized: 1/ 1    Time In: 3:20  Time Out: 4:00  Total Billable Time: 40 minutes    Precautions: Standard, osteoporosis, dementia    s/p Procedures: (3/28/23)  1. Anterior cervical diskectomy, decompression of spinal cord, bilateral foraminotomy C5-6, C6-7  2. Anterior cervical arthrodesis C5-6, C6-7  3. Insertion of biomechanical device C5-6 C6-7  4. Anterior cervical plating C5-C7   5. Harvesting local autograft, implantation of cell based allograft   6. Intraoperative neuro monitoring    PTA Visit #: 0/5     Subjective     Pt reports: she fell asleep without neck brace and woke up with neck pain.  She was compliant with home exercise program.  Response to previous treatment: did well  Functional change: too soon to tell    Pain: 7/10  Location:  posterior neck and shoulder     Objective      Objective Measures updated at progress report unless specified.     Treatment     Nichelle received the treatments listed below:      therapeutic exercises to develop strength, ROM, flexibility, and posture for 30 minutes including:  Supine nods x10, 5 sec hold  Seated Gentle cervical AROM rotation x10, 5 sec hold  Seated cervical isometrics (flex, SB) x10 5 sec hold  Supine cervical AROM rotation x10, 5 sec hold  Gripping x1 min ea with hand gripper rung 3   Seated scap retraction into towel roll 2x10, 5 sec hold  Seated Bruegger's with towel roll x10,  5 sec hold  Corner pec stretch 3x20 sec    May add: balance/gait training    manual therapy techniques: Soft tissue Mobilization were applied to the: cervical muscles for 15 minutes, including:  STM to cervical paraspinals, UT, levator scaps, suboccipitals      Patient Education and Home Exercises       Education provided:   - perform exercises in pain free ROM  Pt gave verbal understanding to all education provided     Written Home Exercises Provided: yes. Exercises were reviewed and Nichelle was able to demonstrate them prior to the end of the session.  Nichelle demonstrated good  understanding of the education provided. See EMR under Patient Instructions for exercises provided during therapy sessions  6/14/23    Assessment     Pt presented with tightness to cervical muscles, which loosened with manual.  She was able to tolerate all exercises without pain.  Left gripping and scap retraction more challenging the right.  She will continue to benefit from PT for improved cervical ROM, balance and gait training for improved functional mobility.    Nichelle Is progressing well towards her goals.   Pt prognosis is Good.     Pt will continue to benefit from skilled outpatient physical therapy to address the deficits listed in the problem list box on initial evaluation, provide pt/family education and to maximize pt's level of independence in the home and community environment.     Pt's spiritual, cultural and educational needs considered and pt agreeable to plan of care and goals.     Anticipated barriers to physical therapy: dependent of transportation, dementia    Goals:   Short Term Goals: 4 weeks (progressing, not met)  1.Report decreased neck pain  <   / =  5  /10  to increase tolerance for ADLs  2. Increased L cervical rotation to at least 45 deg for increased ease with ADLs.   3. Increased R UE strength by 1/3 muscle grade in all deficient planes to increase tolerance for ADLs.  4.  Increased L UE strength by 1/3 muscle  grade in all deficient planes to increase tolerance for ADLs.  5. Pt to tolerate HEP to improve ROM and independence with ADL's  6. Increased cervical R side bending to at least 20 deg for increased ease with ADLs.  7. Increased cervical muscle strength by 1/3 muscle grade in all deficient planes for increased ease with ADLs.    Long Term Goals: 8 weeks (progressing, not met)  1.Report decreased neck pain  <   / =  3  /10  to increase tolerance for ADLs.  2.increased cervical L rotation AROM to at least 55 deg for increased ease with ADLs.  3.Increased R UE strength by 1 muscle grade in all deficient planes  to increase tolerance for ADL and work activities.  4. Increased L UE strength by 1 muscle grade in all deficient planes  to increase tolerance for ADL and work activities.  5. Pt to be Independent with HEP to improve ROM and independence with ADL's  6. Increased cervical muscle strength by 1 muscle grade in all deficient planes for increased ease with ADLs.    Plan     Continue PT towards established goals.  Progress cervical ROM and postural strength as tolerated.  May add balance/gait training.    Plan of care Certification: 6/7/2023 to 8/4/23.    Outpatient Physical Therapy 2 times weekly for 8 weeks to include the following interventions: Electrical Stimulation  , Gait Training, Manual Therapy, Moist Heat/ Ice, Neuromuscular Re-ed, Paraffin, Patient Education, Self Care, Therapeutic Activities, Therapeutic Exercise, and dry needling.     Marlyn Ozuna, PT

## 2023-06-14 ENCOUNTER — CLINICAL SUPPORT (OUTPATIENT)
Dept: REHABILITATION | Facility: HOSPITAL | Age: 68
End: 2023-06-14
Payer: MEDICARE

## 2023-06-14 DIAGNOSIS — M54.2 NECK PAIN: ICD-10-CM

## 2023-06-14 DIAGNOSIS — R26.89 DECREASED FUNCTIONAL MOBILITY: ICD-10-CM

## 2023-06-14 DIAGNOSIS — M25.60 DECREASED RANGE OF MOTION: ICD-10-CM

## 2023-06-14 DIAGNOSIS — M62.81 MUSCLE WEAKNESS: ICD-10-CM

## 2023-06-14 PROCEDURE — 97140 MANUAL THERAPY 1/> REGIONS: CPT | Mod: PN

## 2023-06-14 PROCEDURE — 97110 THERAPEUTIC EXERCISES: CPT | Mod: PN

## 2023-06-19 ENCOUNTER — CLINICAL SUPPORT (OUTPATIENT)
Dept: REHABILITATION | Facility: HOSPITAL | Age: 68
End: 2023-06-19
Payer: MEDICARE

## 2023-06-19 DIAGNOSIS — M25.60 DECREASED RANGE OF MOTION: ICD-10-CM

## 2023-06-19 DIAGNOSIS — R26.89 DECREASED FUNCTIONAL MOBILITY: ICD-10-CM

## 2023-06-19 DIAGNOSIS — M54.2 NECK PAIN: Primary | ICD-10-CM

## 2023-06-19 DIAGNOSIS — M62.81 MUSCLE WEAKNESS: ICD-10-CM

## 2023-06-19 PROCEDURE — 97110 THERAPEUTIC EXERCISES: CPT | Mod: PN

## 2023-06-19 PROCEDURE — 97140 MANUAL THERAPY 1/> REGIONS: CPT | Mod: PN

## 2023-06-19 PROCEDURE — 97530 THERAPEUTIC ACTIVITIES: CPT | Mod: PN

## 2023-06-19 NOTE — PROGRESS NOTES
[facility]   Physical Therapy Treatment Note      Name: Nichelle Barone  [facility] Number: [id number]    Therapy Diagnosis:   Encounter Diagnoses   Name Primary?    Neck pain Yes    Decreased range of motion     Muscle weakness     Decreased functional mobility        Physician: Soto Heller DO    Visit Date: 6/19/2023    Physician Orders: PT Eval and Treat gait training  Medical Diagnosis from Referral: Status post cervical spinal fusion  Evaluation Date: 6/7/2023  Authorization Period Expiration: 12/31/23  Plan of Care Expiration: 8/4/23  Visit # / Visits authorized: 2/ 20    Time In: 2:40  Time Out: 3:20  Total Billable Time: 40 minutes    Precautions: Standard, osteoporosis, dementia    s/p Procedures: (3/28/23)  1. Anterior cervical diskectomy, decompression of spinal cord, bilateral foraminotomy C5-6, C6-7  2. Anterior cervical arthrodesis C5-6, C6-7  3. Insertion of biomechanical device C5-6 C6-7  4. Anterior cervical plating C5-C7   5. Harvesting local autograft, implantation of cell based allograft   6. Intraoperative neuro monitoring    PTA Visit #: 0/5     Subjective     Pt reports: she does not have any pain meds left.  She was compliant with home exercise program.  Response to previous treatment: did well  Functional change: too soon to tell    Pain: 6/10  Location:  posterior neck and shoulder     Objective      Balance:  FTEO X30 SEC  FTEC X30 SEC  Tandem X30 SEC EA  R SLS X30 SEC  L SLS X20 SEC    Treatment     Nichelle received the treatments listed below:      therapeutic exercises to develop strength, ROM, flexibility, and posture for 20 minutes including:  Supine nods x10, 5 sec hold  Seated Gentle cervical AROM rotation x10, 5 sec hold  Seated cervical isometrics (flex, SB) x10 5 sec hold  Supine cervical AROM rotation x10, 5 sec hold  NP Gripping x1 min ea with hand gripper rung 3   Seated scap retraction into towel roll 2x10, 5 sec hold  Seated Bruegger's with towel  roll 2x10, 5 sec hold  Corner pec stretch 3x20 sec    manual therapy techniques: Soft tissue Mobilization were applied to the: cervical muscles for 12 minutes, including:  STM to cervical paraspinals, UT, levator scaps, suboccipitals    Therapeutic activities x8min:  SLS 2x30 sec ea   Dynamic standing balance G sport cord x3 laps ea in // bars (A, P, lateral)    May add: SLS on foam, tandem on foam    Patient Education and Home Exercises       Education provided:   - perform exercises in pain free ROM  Pt gave verbal understanding to all education provided     Written Home Exercises Provided: pt instructed to continue previous HEP.  Exercises were reviewed and Nichelle was able to demonstrate them prior to the end of the session.  Nichelle demonstrated good  understanding of the education provided. See EMR under Patient Instructions for exercises provided during therapy sessions  6/14/23    Assessment     Pt presented with some tightness to B UT and cervical paraspinals, but decreased compared to last visit.  Muscles loosened with manual.  Performed dynamic standing balance with sport cord to simulate walking on floating dock.  Pt able to tolerate balance challenges well.  She will continue to benefit from PT for improved cervical ROM, balance and gait training for improved functional mobility.    Nichelle Is progressing well towards her goals.   Pt prognosis is Good.     Pt will continue to benefit from skilled outpatient physical therapy to address the deficits listed in the problem list box on initial evaluation, provide pt/family education and to maximize pt's level of independence in the home and community environment.     Pt's spiritual, cultural and educational needs considered and pt agreeable to plan of care and goals.     Anticipated barriers to physical therapy: dependent of transportation, dementia    Goals:   Short Term Goals: 4 weeks (progressing, not met)  1.Report decreased neck pain  <   / =  5  /10  to  increase tolerance for ADLs  2. Increased L cervical rotation to at least 45 deg for increased ease with ADLs.   3. Increased R UE strength by 1/3 muscle grade in all deficient planes to increase tolerance for ADLs.  4.  Increased L UE strength by 1/3 muscle grade in all deficient planes to increase tolerance for ADLs.  5. Pt to tolerate HEP to improve ROM and independence with ADL's  6. Increased cervical R side bending to at least 20 deg for increased ease with ADLs.  7. Increased cervical muscle strength by 1/3 muscle grade in all deficient planes for increased ease with ADLs.    Long Term Goals: 8 weeks (progressing, not met)  1.Report decreased neck pain  <   / =  3  /10  to increase tolerance for ADLs.  2.increased cervical L rotation AROM to at least 55 deg for increased ease with ADLs.  3.Increased R UE strength by 1 muscle grade in all deficient planes  to increase tolerance for ADL and work activities.  4. Increased L UE strength by 1 muscle grade in all deficient planes  to increase tolerance for ADL and work activities.  5. Pt to be Independent with HEP to improve ROM and independence with ADL's  6. Increased cervical muscle strength by 1 muscle grade in all deficient planes for increased ease with ADLs.    Plan     Continue PT towards established goals.  Progress cervical ROM and postural strength as tolerated.  May add balance/gait training.    Plan of care Certification: 6/7/2023 to 8/4/23.    Outpatient Physical Therapy 2 times weekly for 8 weeks to include the following interventions: Electrical Stimulation  , Gait Training, Manual Therapy, Moist Heat/ Ice, Neuromuscular Re-ed, Paraffin, Patient Education, Self Care, Therapeutic Activities, Therapeutic Exercise, and dry needling.     Marlyn Ozuna, PT

## 2023-06-21 ENCOUNTER — CLINICAL SUPPORT (OUTPATIENT)
Dept: REHABILITATION | Facility: HOSPITAL | Age: 68
End: 2023-06-21
Payer: MEDICARE

## 2023-06-21 DIAGNOSIS — M62.81 MUSCLE WEAKNESS: ICD-10-CM

## 2023-06-21 DIAGNOSIS — M25.60 DECREASED RANGE OF MOTION: ICD-10-CM

## 2023-06-21 DIAGNOSIS — M54.2 NECK PAIN: Primary | ICD-10-CM

## 2023-06-21 DIAGNOSIS — R26.89 DECREASED FUNCTIONAL MOBILITY: ICD-10-CM

## 2023-06-21 PROCEDURE — 97140 MANUAL THERAPY 1/> REGIONS: CPT | Mod: PN,CQ

## 2023-06-21 PROCEDURE — 97530 THERAPEUTIC ACTIVITIES: CPT | Mod: PN,CQ

## 2023-06-21 PROCEDURE — 97110 THERAPEUTIC EXERCISES: CPT | Mod: PN,CQ

## 2023-06-21 NOTE — PROGRESS NOTES
[facility]   Physical Therapy Treatment Note      Name: Nichelle Barone  [facility] Number: [id number]    Therapy Diagnosis:   Encounter Diagnoses   Name Primary?    Neck pain Yes    Decreased range of motion     Muscle weakness     Decreased functional mobility          Physician: Soto Heller DO    Visit Date: 6/21/2023    Physician Orders: PT Eval and Treat gait training  Medical Diagnosis from Referral: Status post cervical spinal fusion  Evaluation Date: 6/7/2023  Authorization Period Expiration: 12/31/23  Plan of Care Expiration: 8/4/23  Visit # / Visits authorized: 3/ 20    Time In: 1304   Time Out: 1350   Total Time: 46 minutes  Total Billable Time: 46 minutes    Precautions: Standard, osteoporosis, dementia    s/p Procedures: (3/28/23)  1. Anterior cervical diskectomy, decompression of spinal cord, bilateral foraminotomy C5-6, C6-7  2. Anterior cervical arthrodesis C5-6, C6-7  3. Insertion of biomechanical device C5-6 C6-7  4. Anterior cervical plating C5-C7   5. Harvesting local autograft, implantation of cell based allograft   6. Intraoperative neuro monitoring    PTA Visit #: 1/5     Subjective     Pt reports: left posterior neck and shoulder discomfort. Wearing [place] collar. States she sometimes removes it at home 2* uncomfortable 2* sweaty sometimes and it gets dirty, so she washes it. Washing clothes by hand at a high level sink on the boat since the washer is out, but not having any pain with it.       She was compliant with home exercise program.  Response to previous treatment: did well  Functional change: easier to fix her hair and shave her armpits    Pain: 6/10  Location:  left posterior neck and shoulder     Objective      Objective Measures updated at progress report unless specified.    Treatment     Nichelle received the treatments listed below:      therapeutic exercises to develop strength, ROM, flexibility, and posture for 23 minutes including:  Supine nods x10, 5  sec hold  Seated Gentle cervical AROM rotation x10, 5 sec hold  Seated cervical isometrics (flex, SB) x10 5 sec hold  Supine cervical AROM rotation x10, 5 sec hold  NP Gripping x1 min ea with hand gripper rung 3   Seated scap retraction into towel roll 2x10, 5 sec hold  Seated Bruegger's with towel roll 2x10, 5 sec hold  Corner pec stretch 3x20 sec    manual therapy techniques: Soft tissue Mobilization were applied to the: cervical muscles for 10 minutes, including:  STM to cervical paraspinals, UT, levator scaps, suboccipitals    Therapeutic activities x 13 minutes:  SLS on foam 2x30 sec ea -CGA  Dynamic standing balance blue sport cord x3 laps ea in // bars (A, P, lateral) x 5 each -minimal A    May add:  tandem on foam    Patient Education and Home Exercises       Education provided:   - perform exercises in pain free ROM  Pt gave verbal understanding to all education provided     Written Home Exercises Provided: pt instructed to continue previous HEP.      Exercises were reviewed and Nichelle was able to demonstrate them prior to the end of the session.  Nichelle demonstrated good  understanding of the education provided. See EMR under Patient Instructions for exercises provided during therapy sessions  6/14/23    Assessment     Continued neck pain and muscular tightness which improved minimally with manual therapy. Increased resistance with walking with sport cord with minimal A for occasional imbalances with lateral and anterior laps on eccentric return to starting position. Progressed SLS with foam with good tolerance with CGA on occasion. She will continue to benefit from PT for improved cervical ROM, balance and gait training for improved functional mobility.    Nichelle Is progressing well towards her goals.   Pt prognosis is Good.     Pt will continue to benefit from skilled outpatient physical therapy to address the deficits listed in the problem list box on initial evaluation, provide pt/family education and to  maximize pt's level of independence in the home and community environment.     Pt's spiritual, cultural and educational needs considered and pt agreeable to plan of care and goals.     Anticipated barriers to physical therapy: dependent of transportation, dementia    Goals:   Short Term Goals: 4 weeks (progressing, not met)  1.Report decreased neck pain  <   / =  5  /10  to increase tolerance for ADLs  2. Increased L cervical rotation to at least 45 deg for increased ease with ADLs.   3. Increased R UE strength by 1/3 muscle grade in all deficient planes to increase tolerance for ADLs.  4.  Increased L UE strength by 1/3 muscle grade in all deficient planes to increase tolerance for ADLs.  5. Pt to tolerate HEP to improve ROM and independence with ADL's  6. Increased cervical R side bending to at least 20 deg for increased ease with ADLs.  7. Increased cervical muscle strength by 1/3 muscle grade in all deficient planes for increased ease with ADLs.    Long Term Goals: 8 weeks (progressing, not met)  1.Report decreased neck pain  <   / =  3  /10  to increase tolerance for ADLs.  2.increased cervical L rotation AROM to at least 55 deg for increased ease with ADLs.  3.Increased R UE strength by 1 muscle grade in all deficient planes  to increase tolerance for ADL and work activities.  4. Increased L UE strength by 1 muscle grade in all deficient planes  to increase tolerance for ADL and work activities.  5. Pt to be Independent with HEP to improve ROM and independence with ADL's  6. Increased cervical muscle strength by 1 muscle grade in all deficient planes for increased ease with ADLs.    Plan     Continue PT towards established goals.  Progress cervical ROM and postural strength as tolerated.  May add balance/gait training.    Plan of care Certification: 6/7/2023 to 8/4/23.    Outpatient Physical Therapy 2 times weekly for 8 weeks to include the following interventions: Electrical Stimulation  , Gait Training,  Manual Therapy, Moist Heat/ Ice, Neuromuscular Re-ed, Paraffin, Patient Education, Self Care, Therapeutic Activities, Therapeutic Exercise, and dry needling.     Vivi Louise, PTA

## 2023-06-26 ENCOUNTER — CLINICAL SUPPORT (OUTPATIENT)
Dept: REHABILITATION | Facility: HOSPITAL | Age: 68
End: 2023-06-26
Payer: MEDICARE

## 2023-06-26 DIAGNOSIS — M25.60 DECREASED RANGE OF MOTION: ICD-10-CM

## 2023-06-26 DIAGNOSIS — R26.89 DECREASED FUNCTIONAL MOBILITY: ICD-10-CM

## 2023-06-26 DIAGNOSIS — M62.81 MUSCLE WEAKNESS: ICD-10-CM

## 2023-06-26 DIAGNOSIS — M54.2 NECK PAIN: Primary | ICD-10-CM

## 2023-06-26 PROCEDURE — 97110 THERAPEUTIC EXERCISES: CPT | Mod: PN

## 2023-06-26 PROCEDURE — 97530 THERAPEUTIC ACTIVITIES: CPT | Mod: PN

## 2023-06-26 NOTE — PROGRESS NOTES
[facility]   Physical Therapy Treatment Note      Name: Nichelle Barone  [facility] Number: [id number]    Therapy Diagnosis:   Encounter Diagnoses   Name Primary?    Neck pain Yes    Decreased range of motion     Muscle weakness     Decreased functional mobility            Physician: Soto Heller DO    Visit Date: 6/26/2023    Physician Orders: PT Eval and Treat gait training  Medical Diagnosis from Referral: Status post cervical spinal fusion  Evaluation Date: 6/7/2023  Authorization Period Expiration: 12/31/23  Plan of Care Expiration: 8/4/23  Visit # / Visits authorized: 4/ 20    Time In: 1:06   Time Out: 1:55   Total Time: 49 minutes  Total Billable Time: 49 minutes    Precautions: Standard, osteoporosis, dementia    s/p Procedures: (3/28/23)  1. Anterior cervical diskectomy, decompression of spinal cord, bilateral foraminotomy C5-6, C6-7  2. Anterior cervical arthrodesis C5-6, C6-7  3. Insertion of biomechanical device C5-6 C6-7  4. Anterior cervical plating C5-C7   5. Harvesting local autograft, implantation of cell based allograft   6. Intraoperative neuro monitoring    PTA Visit #: 1/5     Subjective     Pt reports: Pt says she has been focusing on keep her shoulders down. Pt verbalized that her cervical motion has been better  She was compliant with home exercise program.  Response to previous treatment: did well  Functional change: easier to fix her hair and shave her armpits  Pain: 6/10  Location:  left posterior neck and shoulder     Objective      Objective Measures updated at progress report unless specified.    Treatment     Nichelle received the treatments listed below:      therapeutic exercises to develop strength, ROM, flexibility, and posture for 23 minutes including:  Supine nods x10, 5 sec hold  Seated Gentle cervical AROM rotation x10, 5 sec hold  Seated cervical isometrics (flex, SB) x10 5 sec hold  Supine cervical AROM rotation x10, 5 sec hold  NP Gripping x1 min ea  with hand gripper rung 3   Seated scap retraction into towel roll 2x10, 5 sec hold  Corner pec stretch 3x20 sec    Neuromuscular re-ed x 6 min to improve postural sense to include: (vc and mc to avoid UT compensation)  Seated row w/ yellow tubing (PT holding band) x10  Seated Bruegger's YTB with towel roll 2x10      NP today: manual therapy techniques: Soft tissue Mobilization were applied to the: cervical muscles for 0 minutes, including:  STM to cervical paraspinals, UT, levator scaps, suboccipitals    Therapeutic activities x 20 minutes:  SLS on foam 2x30 sec ea -CGA  Fitter board Fwd/back, L/R x10  Tandem stance on blue foam 2x30s  Tandem walking in parallel bars x 3 laps  NP Dynamic standing balance blue sport cord x3 laps ea in // bars (A, P, lateral) x 5 each -minimal A    May add: shoulder extension w/ tubing    Patient Education and Home Exercises       Education provided:   - perform exercises in pain free ROM  Pt gave verbal understanding to all education provided     Written Home Exercises Provided: pt instructed to continue previous HEP.      Exercises were reviewed and Nichelle was able to demonstrate them prior to the end of the session.  Nichelle demonstrated good  understanding of the education provided. See EMR under Patient Instructions for exercises provided during therapy sessions  6/14/23    Assessment     Pt's overall cervical ROM and postural awareness has improved but her periscapular strength is still impaired. SLS on blue foam remains challenging but she tolerated the fitter board balance intervention well. Today her upper quarter strengthening was progressed and she handled said progression well with no adverse event. Nichelle will continue to benefit from skilled intervention to restore cervical ROM, increase periscapular strength, and manage pain symptoms.    Nichelle Is progressing well towards her goals.   Pt prognosis is Good.     Pt will continue to benefit from skilled outpatient physical  therapy to address the deficits listed in the problem list box on initial evaluation, provide pt/family education and to maximize pt's level of independence in the home and community environment.     Pt's spiritual, cultural and educational needs considered and pt agreeable to plan of care and goals.     Anticipated barriers to physical therapy: dependent of transportation, dementia    Goals:   Short Term Goals: 4 weeks (progressing, not met)  1.Report decreased neck pain  <   / =  5  /10  to increase tolerance for ADLs  2. Increased L cervical rotation to at least 45 deg for increased ease with ADLs.   3. Increased R UE strength by 1/3 muscle grade in all deficient planes to increase tolerance for ADLs.  4.  Increased L UE strength by 1/3 muscle grade in all deficient planes to increase tolerance for ADLs.  5. Pt to tolerate HEP to improve ROM and independence with ADL's  6. Increased cervical R side bending to at least 20 deg for increased ease with ADLs.  7. Increased cervical muscle strength by 1/3 muscle grade in all deficient planes for increased ease with ADLs.    Long Term Goals: 8 weeks (progressing, not met)  1.Report decreased neck pain  <   / =  3  /10  to increase tolerance for ADLs.  2.increased cervical L rotation AROM to at least 55 deg for increased ease with ADLs.  3.Increased R UE strength by 1 muscle grade in all deficient planes  to increase tolerance for ADL and work activities.  4. Increased L UE strength by 1 muscle grade in all deficient planes  to increase tolerance for ADL and work activities.  5. Pt to be Independent with HEP to improve ROM and independence with ADL's  6. Increased cervical muscle strength by 1 muscle grade in all deficient planes for increased ease with ADLs.    Plan     Continue PT towards established goals.  Progress cervical ROM and postural strength as tolerated.  May add balance/gait training.    Plan of care Certification: 6/7/2023 to 8/4/23.    Outpatient Physical  Therapy 2 times weekly for 8 weeks to include the following interventions: Electrical Stimulation  , Gait Training, Manual Therapy, Moist Heat/ Ice, Neuromuscular Re-ed, Paraffin, Patient Education, Self Care, Therapeutic Activities, Therapeutic Exercise, and dry needling.     Dixon Hollingsworth, SPT

## 2023-06-28 ENCOUNTER — CLINICAL SUPPORT (OUTPATIENT)
Dept: REHABILITATION | Facility: HOSPITAL | Age: 68
End: 2023-06-28
Payer: MEDICARE

## 2023-06-28 DIAGNOSIS — M25.60 DECREASED RANGE OF MOTION: ICD-10-CM

## 2023-06-28 DIAGNOSIS — M62.81 MUSCLE WEAKNESS: ICD-10-CM

## 2023-06-28 DIAGNOSIS — R26.89 DECREASED FUNCTIONAL MOBILITY: ICD-10-CM

## 2023-06-28 DIAGNOSIS — M54.2 NECK PAIN: Primary | ICD-10-CM

## 2023-06-28 PROCEDURE — 97110 THERAPEUTIC EXERCISES: CPT | Mod: PN

## 2023-06-28 PROCEDURE — 97140 MANUAL THERAPY 1/> REGIONS: CPT | Mod: PN

## 2023-06-28 PROCEDURE — 97112 NEUROMUSCULAR REEDUCATION: CPT | Mod: PN

## 2023-06-28 NOTE — PROGRESS NOTES
[facility]   Physical Therapy Treatment Note      Name: Nichelle Barone  [facility] Number: [id number]    Therapy Diagnosis:   Encounter Diagnoses   Name Primary?    Neck pain Yes    Decreased range of motion     Muscle weakness     Decreased functional mobility          Physician: Soto Heller DO    Visit Date: 6/28/2023    Physician Orders: PT Eval and Treat gait training  Medical Diagnosis from Referral: Status post cervical spinal fusion  Evaluation Date: 6/7/2023  Authorization Period Expiration: 12/31/23  Plan of Care Expiration: 8/4/23  Visit # / Visits authorized: 5/ 20    Time In: 1:06   Time Out: 1:50   Total Time: 44 minutes  Total Billable Time: 44 minutes    Precautions: Standard, osteoporosis, dementia    s/p Procedures: (3/28/23)  1. Anterior cervical diskectomy, decompression of spinal cord, bilateral foraminotomy C5-6, C6-7  2. Anterior cervical arthrodesis C5-6, C6-7  3. Insertion of biomechanical device C5-6 C6-7  4. Anterior cervical plating C5-C7   5. Harvesting local autograft, implantation of cell based allograft   6. Intraoperative neuro monitoring    PTA Visit #: 1/5     Subjective     Pt reports: Pt reports not feeling much different since we last saw her  She was compliant with home exercise program.  Response to previous treatment: Sore  Functional change: easier to fix her hair and shave her armpits  Pain: 6/10  Location:  left posterior neck and shoulder     Objective      Objective Measures updated at progress report unless specified.    Treatment     Nichelle received the treatments listed below:      therapeutic exercises to develop strength, ROM, flexibility, and posture for 23 minutes including:  Supine nods x10, 5 sec hold  Supine cervical AROM rotation x10, 5 sec hold  Seated Gentle cervical AROM rotation x10, 5 sec hold  Seated cervical isometrics (flex, SB) x10 5 sec hold  Gripping x1 min ea with hand gripper rung 3   Seated scap retraction into towel  roll 2x10, 5 sec hold  Corner pec stretch 3x20 sec    Neuromuscular re-ed x 10 min to improve postural sense to include: (vc and mc to avoid UT compensation)  Seated row w/ yellow tubing (PT holding band) x10  Seated shoulder extension w/ yellow tubing x10  Seated Bruegger's YTB with towel roll 2x10      manual therapy techniques: Soft tissue Mobilization were applied to the: cervical muscles for 8 minutes, including:  STM to cervical paraspinals, UT, and suboccipitals    Therapeutic activities x 3 minutes:  SLS on foam 2x30 sec ea -CGA  NP Fitter board Fwd/back, L/R x10  NP Tandem stance on blue foam 2x30s  NP Tandem walking in parallel bars x 3 laps  NP Dynamic standing balance blue sport cord x3 laps ea in // bars (A, P, lateral) x 5 each -minimal A    May add: shoulder extension w/ tubing    Patient Education and Home Exercises       Education provided:   - perform exercises in pain free ROM  Pt gave verbal understanding to all education provided     Written Home Exercises Provided: pt instructed to continue previous HEP.      Exercises were reviewed and Nichelle was able to demonstrate them prior to the end of the session.  Nichelle demonstrated good  understanding of the education provided. See EMR under Patient Instructions for exercises provided during therapy sessions  6/14/23    Assessment     Nichelle is still limited in cervical ROM and periscapular strength, however, her activity tolerance is improving as demonstrated by her participation in new interventions with no adverse event. She will benefit from continued skilled care in order to restore functional cervical ROM, upper quarter strength, and return to prior level of function.    Nichelle Is progressing well towards her goals.   Pt prognosis is Good.     Pt will continue to benefit from skilled outpatient physical therapy to address the deficits listed in the problem list box on initial evaluation, provide pt/family education and to maximize pt's level of  independence in the home and community environment.     Pt's spiritual, cultural and educational needs considered and pt agreeable to plan of care and goals.     Anticipated barriers to physical therapy: dependent of transportation, dementia    Goals:   Short Term Goals: 4 weeks (progressing, not met)  1.Report decreased neck pain  <   / =  5  /10  to increase tolerance for ADLs  2. Increased L cervical rotation to at least 45 deg for increased ease with ADLs.   3. Increased R UE strength by 1/3 muscle grade in all deficient planes to increase tolerance for ADLs.  4.  Increased L UE strength by 1/3 muscle grade in all deficient planes to increase tolerance for ADLs.  5. Pt to tolerate HEP to improve ROM and independence with ADL's  6. Increased cervical R side bending to at least 20 deg for increased ease with ADLs.  7. Increased cervical muscle strength by 1/3 muscle grade in all deficient planes for increased ease with ADLs.    Long Term Goals: 8 weeks (progressing, not met)  1.Report decreased neck pain  <   / =  3  /10  to increase tolerance for ADLs.  2.increased cervical L rotation AROM to at least 55 deg for increased ease with ADLs.  3.Increased R UE strength by 1 muscle grade in all deficient planes  to increase tolerance for ADL and work activities.  4. Increased L UE strength by 1 muscle grade in all deficient planes  to increase tolerance for ADL and work activities.  5. Pt to be Independent with HEP to improve ROM and independence with ADL's  6. Increased cervical muscle strength by 1 muscle grade in all deficient planes for increased ease with ADLs.    Plan     Continue PT towards established goals.  Progress cervical ROM and postural strength as tolerated.  May add balance/gait training.    Plan of care Certification: 6/7/2023 to 8/4/23.    Outpatient Physical Therapy 2 times weekly for 8 weeks to include the following interventions: Electrical Stimulation  , Gait Training, Manual Therapy, Moist Heat/  Ice, Neuromuscular Re-ed, Paraffin, Patient Education, Self Care, Therapeutic Activities, Therapeutic Exercise, and dry needling.     Dixon Hollingsworth, SPT

## 2023-07-03 ENCOUNTER — CLINICAL SUPPORT (OUTPATIENT)
Dept: REHABILITATION | Facility: HOSPITAL | Age: 68
End: 2023-07-03
Payer: MEDICARE

## 2023-07-03 DIAGNOSIS — R26.89 DECREASED FUNCTIONAL MOBILITY: ICD-10-CM

## 2023-07-03 DIAGNOSIS — M25.60 DECREASED RANGE OF MOTION: ICD-10-CM

## 2023-07-03 DIAGNOSIS — M62.81 MUSCLE WEAKNESS: ICD-10-CM

## 2023-07-03 DIAGNOSIS — M54.2 NECK PAIN: Primary | ICD-10-CM

## 2023-07-03 PROCEDURE — 97140 MANUAL THERAPY 1/> REGIONS: CPT | Mod: PN,CQ

## 2023-07-03 PROCEDURE — 97112 NEUROMUSCULAR REEDUCATION: CPT | Mod: PN,CQ

## 2023-07-03 PROCEDURE — 97110 THERAPEUTIC EXERCISES: CPT | Mod: PN,CQ

## 2023-07-03 PROCEDURE — 97530 THERAPEUTIC ACTIVITIES: CPT | Mod: PN,CQ

## 2023-07-03 NOTE — PROGRESS NOTES
"[facility]   Physical Therapy Treatment Note      Name: Nichelle Barone  [facility] Number: [id number]    Therapy Diagnosis:   Encounter Diagnoses   Name Primary?    Neck pain Yes    Decreased range of motion     Muscle weakness     Decreased functional mobility            Physician: Soto Heller DO    Visit Date: 7/3/2023    Physician Orders: PT Eval and Treat gait training  Medical Diagnosis from Referral: Status post cervical spinal fusion  Evaluation Date: 6/7/2023  Authorization Period Expiration: 12/31/23  Plan of Care Expiration: 8/4/23  Visit # / Visits authorized: 6/ 20    Time In: 1300   Time Out: 1358   Total Time: 58 minutes  Total Billable Time: 58 minutes    Precautions: Standard, osteoporosis, dementia    s/p Procedures: (3/28/23)  1. Anterior cervical diskectomy, decompression of spinal cord, bilateral foraminotomy C5-6, C6-7  2. Anterior cervical arthrodesis C5-6, C6-7  3. Insertion of biomechanical device C5-6 C6-7  4. Anterior cervical plating C5-C7   5. Harvesting local autograft, implantation of cell based allograft   6. Intraoperative neuro monitoring    PTA Visit #: 1/5     Subjective     Pt reports: 7/10 bilateral posterior neck and left UT pain. "It's been miserably hot outside." Putting away groceries is a little easier. Wearing [place] J collar. Taking meds for diarrhea which is a chronic issue 2* digestive issues.     She was compliant with home exercise program.  Response to previous treatment: Sore  Functional change: easier to fix her hair and shave her armpits    Pain: 7/10  Location:  left posterior neck and shoulder     Objective      Objective Measures updated at progress report unless specified.    Treatment     Nichelle received the treatments listed below:      therapeutic exercises to develop strength, ROM, flexibility, and posture for 25 minutes including:  Supine nods x10, 5 sec hold  Supine cervical AROM rotation x10, 5 sec hold  Seated Gentle cervical " AROM rotation x10, 5 sec hold  Seated cervical isometrics (flex, SB) x10 5 sec hold  Gripping x1 min ea with hand gripper rung 3   Seated scap retraction into towel roll 2x10, 5 sec hold  Corner pec stretch 3x20 sec    Neuromuscular re-ed x 13 minutes to improve postural sense to include: (vc and mc to avoid UT compensation)  Seated row w/ yellow tubing (therapist holding band) x10  Seated shoulder extension w/ yellow tubing x15  Seated Bruegger's YTB with towel roll 2x10      manual therapy techniques: Soft tissue Mobilization were applied to the: cervical muscles for 10 minutes, including:  STM to cervical paraspinals, UT, and suboccipitals    Therapeutic activities x 10 minutes:  SLS on foam 2x30 sec ea -CGA  NP Fitter board Fwd/back, L/R x10  Tandem stance on blue foam 2x30s  NP Tandem walking in parallel bars x 3 laps  NP Dynamic standing balance blue sport cord x3 laps ea in // bars (A, P, lateral) x 5 each -minimal A  Cervical posture training      Patient Education and Home Exercises       Education provided:   - perform exercises in pain free ROM  Pt gave verbal understanding to all education provided     Written Home Exercises Provided: pt instructed to continue previous HEP.      Exercises were reviewed and Nichelle was able to demonstrate them prior to the end of the session.  Nichelle demonstrated good  understanding of the education provided. See EMR under Patient Instructions for exercises provided during therapy sessions  6/14/23    Assessment     Resting posture without neck collar in left side flexion with right rotation. Improving functional mobility as putting away the groceries is getting easier, but pt continues to experience high levels of pain. Uses mirror feedback at home for her exercises-instructed pt to also be mindful to correct her cervical posture while exercising. Able to  SLS on foam without UE support but did have mild lateral drifting with CGA for safety 2* limited protective  response. Improved pain reported after session. She will benefit from continued skilled care in order to restore functional cervical ROM, upper quarter strength, and return to prior level of function.    Nichelle Is progressing well towards her goals.   Pt prognosis is Good.     Pt will continue to benefit from skilled outpatient physical therapy to address the deficits listed in the problem list box on initial evaluation, provide pt/family education and to maximize pt's level of independence in the home and community environment.     Pt's spiritual, cultural and educational needs considered and pt agreeable to plan of care and goals.     Anticipated barriers to physical therapy: dependent of transportation, dementia    Goals:   Short Term Goals: 4 weeks (progressing, not met)  1.Report decreased neck pain  <   / =  5  /10  to increase tolerance for ADLs  2. Increased L cervical rotation to at least 45 deg for increased ease with ADLs.   3. Increased R UE strength by 1/3 muscle grade in all deficient planes to increase tolerance for ADLs.  4.  Increased L UE strength by 1/3 muscle grade in all deficient planes to increase tolerance for ADLs.  5. Pt to tolerate HEP to improve ROM and independence with ADL's  6. Increased cervical R side bending to at least 20 deg for increased ease with ADLs.  7. Increased cervical muscle strength by 1/3 muscle grade in all deficient planes for increased ease with ADLs.    Long Term Goals: 8 weeks (progressing, not met)  1.Report decreased neck pain  <   / =  3  /10  to increase tolerance for ADLs.  2.increased cervical L rotation AROM to at least 55 deg for increased ease with ADLs.  3.Increased R UE strength by 1 muscle grade in all deficient planes  to increase tolerance for ADL and work activities.  4. Increased L UE strength by 1 muscle grade in all deficient planes  to increase tolerance for ADL and work activities.  5. Pt to be Independent with HEP to improve ROM and independence  with ADL's  6. Increased cervical muscle strength by 1 muscle grade in all deficient planes for increased ease with ADLs.    Plan     Continue PT towards established goals.  Progress cervical ROM and postural strength as tolerated.  May add balance/gait training.    Plan of care Certification: 6/7/2023 to 8/4/23.    Outpatient Physical Therapy 2 times weekly for 8 weeks to include the following interventions: Electrical Stimulation  , Gait Training, Manual Therapy, Moist Heat/ Ice, Neuromuscular Re-ed, Paraffin, Patient Education, Self Care, Therapeutic Activities, Therapeutic Exercise, and dry needling.     Vivi Louise, PTA

## 2023-07-05 ENCOUNTER — CLINICAL SUPPORT (OUTPATIENT)
Dept: REHABILITATION | Facility: HOSPITAL | Age: 68
End: 2023-07-05
Payer: MEDICARE

## 2023-07-05 DIAGNOSIS — R26.89 DECREASED FUNCTIONAL MOBILITY: ICD-10-CM

## 2023-07-05 DIAGNOSIS — M25.60 DECREASED RANGE OF MOTION: ICD-10-CM

## 2023-07-05 DIAGNOSIS — M62.81 MUSCLE WEAKNESS: ICD-10-CM

## 2023-07-05 DIAGNOSIS — M54.2 NECK PAIN: Primary | ICD-10-CM

## 2023-07-05 PROCEDURE — 97112 NEUROMUSCULAR REEDUCATION: CPT | Mod: PN

## 2023-07-05 PROCEDURE — 97530 THERAPEUTIC ACTIVITIES: CPT | Mod: PN

## 2023-07-05 PROCEDURE — 97110 THERAPEUTIC EXERCISES: CPT | Mod: PN

## 2023-07-05 NOTE — PROGRESS NOTES
[facility]   Reassessment and Physical Therapy Treatment Note      Name: Nichelle Barone  Clinic Number: [id number]    Therapy Diagnosis:   Encounter Diagnoses   Name Primary?    Neck pain Yes    Decreased range of motion     Muscle weakness     Decreased functional mobility          Physician: Soto Heller DO    Visit Date: 7/5/2023    Physician Orders: PT Eval and Treat gait training  Medical Diagnosis from Referral: Status post cervical spinal fusion  Evaluation Date: 6/7/2023  Authorization Period Expiration: 12/31/23  Plan of Care Expiration: 8/4/23 (reassessed on 7/5/23)  Visit # / Visits authorized: 7/ 20    Time In: 1:05   Time Out: 1:45   Total Time: 40 minutes  Total Billable Time: 40 minutes    Precautions: Standard, osteoporosis, dementia    s/p Procedures: (3/28/23)  1. Anterior cervical diskectomy, decompression of spinal cord, bilateral foraminotomy C5-6, C6-7  2. Anterior cervical arthrodesis C5-6, C6-7  3. Insertion of biomechanical device C5-6 C6-7  4. Anterior cervical plating C5-C7   5. Harvesting local autograft, implantation of cell based allograft   6. Intraoperative neuro monitoring    PTA Visit #: 1/5     Subjective     Pt reports: Pt reports her bone stimulator machine stopped working. Pt notices she is able to keep her shoulders down and in a better position.   She was compliant with home exercise program.  Response to previous treatment: Sore  Functional change: easier to fix her hair and shave her armpits    Pain: 6/10  Location:  left posterior neck and shoulder     Objective    Cervical Range of Motion:     Degrees Pain   Flexion 44 no      Extension 32 no      Right Rotation 60 no      Left Rotation 45 Pain on L side of neck      Right Side Bending 30 Pain on L side of neck   Left Side Bending 32 Pulling on L      Shoulder Range of Motion:   Shoulder Right Left   Flexion WFL WFL   Abduction WFL WFL   ER WFL WFL   IR T10 T7      Strength:  Cervical MMT    Flexion 5/5   Extension 4+/5   Right Side Bend 4+/5   Left Side Bend 4+/5      Upper Extremity Strength  (R) UE   (L) UE     Shoulder flexion: 4+/5 Shoulder flexion: 5/5   Shoulder Abduction: 4+/5 Shoulder abduction: 4+/5   Shoulder ER 4-/5 Shoulder ER 4/5   Shoulder IR 4/5 Shoulder IR 4/5   Elbow flexion: 4+/5 Elbow flexion: 4/5   Elbow extension: 4+/5 Elbow extension: 4+/5       strength (Patel, L2, avg of 3 trials in lbs.)  R: 60, 57, 59  L: 45, 48, 50     Palpation: TTP and tightness L UT, and cervical paraspinals      SLS: 30s bilaterally    FOTO: cervical 47%    Treatment     Nichelle received the treatments listed below:      therapeutic exercises to develop strength, ROM, flexibility, and posture for 20 minutes including:  Reassessed  Supine nods x10, 5 sec hold  Supine cervical AROM rotation x10, 5 sec hold    NP (time):   Seated cervical isometrics (flex, SB) x10 5 sec hold  Seated Gentle cervical AROM rotation x10, 5 sec hold  Gripping x1 min ea with hand gripper rung 3   Seated scap retraction into towel roll 2x10, 5 sec hold  Corner pec stretch 3x20 sec    Neuromuscular re-ed x 12 minutes to improve postural sense to include: (vc and mc to avoid UT compensation)  Seated row w/ yellow tubing (therapist holding band) x10  Seated shoulder extension w/ yellow tubing x15  Seated Bruegger's RTB 2x10      NP today manual therapy techniques: Soft tissue Mobilization were applied to the: cervical muscles for 00 minutes, including:  STM to cervical paraspinals, UT, and suboccipitals    Therapeutic activities x 8 minutes:  SLS on foam 2x30 sec ea -CGA    NP Fitter board Fwd/back, L/R x10  NP Tandem walking in parallel bars x 3 laps  NP Dynamic standing balance blue sport cord x3 laps ea in // bars (A, P, lateral) x 5 each -minimal A  NP Cervical posture training  NP Tandem stance on blue foam 2x30s    Patient Education and Home Exercises       Education provided:   - perform exercises in pain free ROM  Pt gave  verbal understanding to all education provided     Written Home Exercises Provided: pt instructed to continue previous HEP.      Exercises were reviewed and Nichelle was able to demonstrate them prior to the end of the session.  Nichelle demonstrated good  understanding of the education provided. See EMR under Patient Instructions for exercises provided during therapy sessions  6/14/23    Assessment     Pt was reassessed today and has improved her cervical ROM, cervical strength, UE strength,  strength, and balance since her evaluation. Pt handled progression of periscapular strengthening well with no adverse event. Pt is still limited in periscapular strength and cervical ROM. Pt will continue to benefit from therapy to restore full cervical ROM, improve upper quarter strength, and return to prior level of function.    Nichelle Is progressing well towards her goals.   Pt prognosis is Good.     Pt will continue to benefit from skilled outpatient physical therapy to address the deficits listed in the problem list box on initial evaluation, provide pt/family education and to maximize pt's level of independence in the home and community environment.     Pt's spiritual, cultural and educational needs considered and pt agreeable to plan of care and goals.     Anticipated barriers to physical therapy: dependent of transportation, dementia    Goals:   Short Term Goals: 4 weeks   1.Report decreased neck pain  <   / =  5  /10  to increase tolerance for ADLs  2. Increased L cervical rotation to at least 45 deg for increased ease with ADLs. (MET)  3. Increased R UE strength by 1/3 muscle grade in all deficient planes to increase tolerance for ADLs. (Partially met)  4.  Increased L UE strength by 1/3 muscle grade in all deficient planes to increase tolerance for ADLs. (Partially met)   5. Pt to tolerate HEP to improve ROM and independence with ADL's (MET)  6. Increased cervical R side bending to at least 20 deg for increased ease  with ADLs. (MET)  7. Increased cervical muscle strength by 1/3 muscle grade in all deficient planes for increased ease with ADLs. (MET)    Long Term Goals: 8 weeks (progressing, not met)  1.Report decreased neck pain  <   / =  3  /10  to increase tolerance for ADLs.  2.increased cervical L rotation AROM to at least 55 deg for increased ease with ADLs.  3.Increased R UE strength by 1 muscle grade in all deficient planes  to increase tolerance for ADL and work activities.  4. Increased L UE strength by 1 muscle grade in all deficient planes  to increase tolerance for ADL and work activities.  5. Pt to be Independent with HEP to improve ROM and independence with ADL's  6. Increased cervical muscle strength by 1 muscle grade in all deficient planes for increased ease with ADLs.  Unmet goals remain appropriate within 4 weeks  Plan     Continue PT towards established goals.  Progress cervical ROM and postural strength as tolerated.  May add balance/gait training.    Plan of care Certification: 6/7/2023 to 8/4/23.    Outpatient Physical Therapy 2 times weekly for 8 weeks to include the following interventions: Electrical Stimulation  , Gait Training, Manual Therapy, Moist Heat/ Ice, Neuromuscular Re-ed, Paraffin, Patient Education, Self Care, Therapeutic Activities, Therapeutic Exercise, and dry needling.     Marlyn Ozuna, PT

## 2023-07-11 ENCOUNTER — CLINICAL SUPPORT (OUTPATIENT)
Dept: REHABILITATION | Facility: HOSPITAL | Age: 68
End: 2023-07-11
Payer: MEDICARE

## 2023-07-11 DIAGNOSIS — M62.81 MUSCLE WEAKNESS: ICD-10-CM

## 2023-07-11 DIAGNOSIS — M25.60 DECREASED RANGE OF MOTION: ICD-10-CM

## 2023-07-11 DIAGNOSIS — R26.89 DECREASED FUNCTIONAL MOBILITY: ICD-10-CM

## 2023-07-11 DIAGNOSIS — M54.2 NECK PAIN: Primary | ICD-10-CM

## 2023-07-11 PROCEDURE — 97140 MANUAL THERAPY 1/> REGIONS: CPT | Mod: PN,CQ

## 2023-07-11 PROCEDURE — 97112 NEUROMUSCULAR REEDUCATION: CPT | Mod: PN,CQ

## 2023-07-11 PROCEDURE — 97110 THERAPEUTIC EXERCISES: CPT | Mod: PN,CQ

## 2023-07-11 NOTE — PROGRESS NOTES
[facility]   Physical Therapy Treatment Note      Name: Nichelle Barone  Clinic Number: [id number]    Therapy Diagnosis:   Encounter Diagnoses   Name Primary?    Neck pain Yes    Decreased range of motion     Muscle weakness     Decreased functional mobility          Physician: Soto Heller DO    Visit Date: 7/11/2023    Physician Orders: PT Eval and Treat gait training  Medical Diagnosis from Referral: Status post cervical spinal fusion  Evaluation Date: 6/7/2023  Authorization Period Expiration: 12/31/23  Plan of Care Expiration: 8/4/23 (reassessed on 7/5/23)  Visit # / Visits authorized: 8/ 20    Time In: 1302   Time Out: 1345   Total Time: 43 minutes  Total Billable Time: 43 minutes      Precautions: Standard, osteoporosis, dementia    s/p Procedures: (3/28/23)  1. Anterior cervical diskectomy, decompression of spinal cord, bilateral foraminotomy C5-6, C6-7  2. Anterior cervical arthrodesis C5-6, C6-7  3. Insertion of biomechanical device C5-6 C6-7  4. Anterior cervical plating C5-C7   5. Harvesting local autograft, implantation of cell based allograft   6. Intraoperative neuro monitoring    PTA Visit #: 1/5     Subjective     Pt reports: she is agitated with riding in the car fast while managin traffic to clinic. No pain meds today. Wearing [place] J collar. Marlyn, PT spoke with pt's  and stated pt had agitation prior to coming to session today.     She was compliant with home exercise program.  Response to previous treatment: Sore  Functional change: easier to fix her hair and shave her armpits    Pain: 9/10  Location:  left posterior neck and shoulder         Objective Measures updated at progress report unless specified.    Treatment     Nichelle received the treatments listed below:      therapeutic exercises to develop strength, ROM, flexibility, and posture for 12 minutes including:  Supine nods x10, 5 sec hold  Supine cervical AROM rotation x10, 5 sec hold  Seated  cervical isometrics (flex, SB) x10 5 sec hold    NP (time):   Seated Gentle cervical AROM rotation x10, 5 sec hold  Gripping x1 min ea with hand gripper rung 3   Seated scap retraction into towel roll 2x10, 5 sec hold  Corner pec stretch 3x20 sec    Neuromuscular re-ed x 14 minutes to improve postural sense to include: (vc and mc to avoid UT compensation)  Seated row w/ blue tubing (therapist holding band) x10  NP-Seated shoulder extension w/ yellow tubing x15  Seated Bruegger's RTB 2x10      Manual therapy techniques: Soft tissue Mobilization were applied to the: cervical muscles for 15 minutes, including:  STM to cervical paraspinals, UT, and suboccipitals    Therapeutic activities x 2 minutes:  SLS on foam 2x30 sec ea -CGA    NP Fitter board Fwd/back, L/R x10  NP Tandem walking in parallel bars x 3 laps  NP Dynamic standing balance blue sport cord x3 laps ea in // bars (A, P, lateral) x 5 each -minimal A  NP Cervical posture training  NP Tandem stance on blue foam 2x30s    Patient Education and Home Exercises       Education provided:   - perform exercises in pain free ROM  Pt gave verbal understanding to all education provided     Written Home Exercises Provided: pt instructed to continue previous HEP.      Exercises were reviewed and Nichelle was able to demonstrate them prior to the end of the session.  Nichelle demonstrated good  understanding of the education provided. See EMR under Patient Instructions for exercises provided during therapy sessions  6/14/23    Assessment     Agitation today and impulsive, using foul language and stating her pain is 9/10. Reporting multiple stressors currently. Also admits she is constipated and felt lightheaded while on the toilet this morning. Able to calm down minimally after manual therapy, but increased discomfort with isometrics despite cues for decreased contractions to comfort ranges only. Easily distracted with tangential conversation with agitation and required frequent  redirection for task completion. Able to progress to blue tubing with rows today without UT compensation. Mild improvement in disposition after session.  Pt will continue to benefit from therapy to restore full cervical ROM, improve upper quarter strength, and return to prior level of function.    Nichelle Is progressing well towards her goals.   Pt prognosis is Good.     Pt will continue to benefit from skilled outpatient physical therapy to address the deficits listed in the problem list box on initial evaluation, provide pt/family education and to maximize pt's level of independence in the home and community environment.     Pt's spiritual, cultural and educational needs considered and pt agreeable to plan of care and goals.     Anticipated barriers to physical therapy: dependent of transportation, dementia    Goals:   Short Term Goals: 4 weeks   1.Report decreased neck pain  <   / =  5  /10  to increase tolerance for ADLs  2. Increased L cervical rotation to at least 45 deg for increased ease with ADLs. (MET)  3. Increased R UE strength by 1/3 muscle grade in all deficient planes to increase tolerance for ADLs. (Partially met)  4.  Increased L UE strength by 1/3 muscle grade in all deficient planes to increase tolerance for ADLs. (Partially met)   5. Pt to tolerate HEP to improve ROM and independence with ADL's (MET)  6. Increased cervical R side bending to at least 20 deg for increased ease with ADLs. (MET)  7. Increased cervical muscle strength by 1/3 muscle grade in all deficient planes for increased ease with ADLs. (MET)    Long Term Goals: 8 weeks (progressing, not met)  1.Report decreased neck pain  <   / =  3  /10  to increase tolerance for ADLs.  2.increased cervical L rotation AROM to at least 55 deg for increased ease with ADLs.  3.Increased R UE strength by 1 muscle grade in all deficient planes  to increase tolerance for ADL and work activities.  4. Increased L UE strength by 1 muscle grade in all  deficient planes  to increase tolerance for ADL and work activities.  5. Pt to be Independent with HEP to improve ROM and independence with ADL's  6. Increased cervical muscle strength by 1 muscle grade in all deficient planes for increased ease with ADLs.  Unmet goals remain appropriate within 4 weeks  Plan     Continue PT towards established goals.  Progress cervical ROM and postural strength as tolerated.  May add balance/gait training.    Plan of care Certification: 6/7/2023 to 8/4/23.    Outpatient Physical Therapy 2 times weekly for 8 weeks to include the following interventions: Electrical Stimulation  , Gait Training, Manual Therapy, Moist Heat/ Ice, Neuromuscular Re-ed, Paraffin, Patient Education, Self Care, Therapeutic Activities, Therapeutic Exercise, and dry needling.     Vivi Louise, PTA

## 2023-07-12 ENCOUNTER — HOSPITAL ENCOUNTER (OUTPATIENT)
Dept: RADIOLOGY | Facility: HOSPITAL | Age: 68
Discharge: HOME | End: 2023-07-12
Attending: FAMILY MEDICINE
Payer: MEDICARE

## 2023-07-12 DIAGNOSIS — Z87.891 PERSONAL HISTORY OF NICOTINE DEPENDENCE: ICD-10-CM

## 2023-07-12 DIAGNOSIS — Z72.0 NICOTINE ABUSE: ICD-10-CM

## 2023-07-12 PROBLEM — J43.2 CENTRILOBULAR EMPHYSEMA: Status: ACTIVE | Noted: 2023-07-12

## 2023-07-12 PROCEDURE — 71271 CT CHEST LUNG SCREENING LOW DOSE: ICD-10-PCS | Mod: 26,,, | Performed by: RADIOLOGY

## 2023-07-12 PROCEDURE — 71271 CT THORAX LUNG CANCER SCR C-: CPT | Mod: TC,PO

## 2023-07-12 PROCEDURE — 71271 CT THORAX LUNG CANCER SCR C-: CPT | Mod: 26,,, | Performed by: RADIOLOGY

## 2023-07-17 NOTE — PROGRESS NOTES
[facility]   Physical Therapy Treatment Note      Name: Nichelle Barone  [facility] Number: [id number]    Therapy Diagnosis:   Encounter Diagnoses   Name Primary?    Neck pain Yes    Decreased range of motion     Muscle weakness     Decreased functional mobility            Physician: Soto Heller DO    Visit Date: 7/18/2023    Physician Orders: PT Eval and Treat gait training  Medical Diagnosis from Referral: Status post cervical spinal fusion  Evaluation Date: 6/7/2023  Authorization Period Expiration: 12/31/23  Plan of Care Expiration: 8/4/23 (reassessed on 7/5/23)  Visit # / Visits authorized: 8/ 20    Time In: 1:00  Time Out: 1:48  Total Time: 48 minutes  Total Billable Time: 48 minutes      Precautions: Standard, osteoporosis, dementia    s/p Procedures: (3/28/23)  1. Anterior cervical diskectomy, decompression of spinal cord, bilateral foraminotomy C5-6, C6-7  2. Anterior cervical arthrodesis C5-6, C6-7  3. Insertion of biomechanical device C5-6 C6-7  4. Anterior cervical plating C5-C7   5. Harvesting local autograft, implantation of cell based allograft   6. Intraoperative neuro monitoring    PTA Visit #: 1/5     Subjective     Pt reports: her mouth is sore.  She is on an antibiotic and she has dental work scheduled this week.    She was compliant with home exercise program.  Response to previous treatment: Sore  Functional change: easier to fix her hair and shave her armpits    Pain: 7/10  Location:  left posterior neck and shoulder     Objective Measures updated at progress report unless specified.    Treatment     Nichelle received the treatments listed below:      therapeutic exercises to develop strength, ROM, flexibility, and posture for 15 minutes including:  Supine nods x10, 5 sec hold  Supine cervical AROM rotation x10, 5 sec hold  Supine scap protraction 2# 2x10  Seated cervical isometrics (flex, SB) x10 5 sec hold  Seated scap retraction into towel roll 2x10, 5 sec  hold  Posterior sh rolls x10  Corner pec stretch 3x20 sec    NP (time):   Seated Gentle cervical AROM rotation x10, 5 sec hold  Gripping x1 min ea with hand gripper rung 3     Neuromuscular re-ed x 15 minutes to improve postural sense to include: (vc and mc to avoid UT compensation)  Seated row w/ blue tubing (therapist holding band) x10  Seated shoulder extension w/ yellow tubing x15  Seated Bruegger's RTB 2x10      Manual therapy techniques: Soft tissue Mobilization were applied to the: cervical muscles for 8 minutes, including:  STM to cervical paraspinals, UT, and suboccipitals    Therapeutic activities x 10 minutes:  SLS on foam 2x30 sec ea -CGA  Fitter board Fwd/back, L/R x10  NP Tandem walking in parallel bars x 3 laps  NP Dynamic standing balance blue sport cord x3 laps ea in // bars (A, P, lateral) x 5 each -minimal A  Tandem stance on blue foam 2x30s    Patient Education and Home Exercises       Education provided:   - perform exercises in pain free ROM  Pt gave verbal understanding to all education provided     Written Home Exercises Provided: pt instructed to continue previous HEP.      Exercises were reviewed and Nichelle was able to demonstrate them prior to the end of the session.  Nichelle demonstrated good  understanding of the education provided. See EMR under Patient Instructions for exercises provided during therapy sessions  6/14/23    Assessment     Pt tolerated treatment session well.  She demonstrated good balance strategies today.  She continues to be more limited with L cervical rotation ROM compared to R rotation.  She will continue to benefit from PT for improved cervical ROM, postural stability, and balance for improved functional mobility.     Nichelle Is progressing well towards her goals.   Pt prognosis is Good.     Pt will continue to benefit from skilled outpatient physical therapy to address the deficits listed in the problem list box on initial evaluation, provide pt/family education and  to maximize pt's level of independence in the home and community environment.     Pt's spiritual, cultural and educational needs considered and pt agreeable to plan of care and goals.     Anticipated barriers to physical therapy: dependent of transportation, dementia    Goals:   Short Term Goals: 4 weeks   1.Report decreased neck pain  <   / =  5  /10  to increase tolerance for ADLs  2. Increased L cervical rotation to at least 45 deg for increased ease with ADLs. (MET)  3. Increased R UE strength by 1/3 muscle grade in all deficient planes to increase tolerance for ADLs. (Partially met)  4.  Increased L UE strength by 1/3 muscle grade in all deficient planes to increase tolerance for ADLs. (Partially met)   5. Pt to tolerate HEP to improve ROM and independence with ADL's (MET)  6. Increased cervical R side bending to at least 20 deg for increased ease with ADLs. (MET)  7. Increased cervical muscle strength by 1/3 muscle grade in all deficient planes for increased ease with ADLs. (MET)    Long Term Goals: 8 weeks (progressing, not met)  1.Report decreased neck pain  <   / =  3  /10  to increase tolerance for ADLs.  2.increased cervical L rotation AROM to at least 55 deg for increased ease with ADLs.  3.Increased R UE strength by 1 muscle grade in all deficient planes  to increase tolerance for ADL and work activities.  4. Increased L UE strength by 1 muscle grade in all deficient planes  to increase tolerance for ADL and work activities.  5. Pt to be Independent with HEP to improve ROM and independence with ADL's  6. Increased cervical muscle strength by 1 muscle grade in all deficient planes for increased ease with ADLs.  Unmet goals remain appropriate within 4 weeks  Plan     Continue PT towards established goals.  Progress cervical ROM and postural strength as tolerated.  May add balance/gait training.    Plan of care Certification: 6/7/2023 to 8/4/23.    Outpatient Physical Therapy 2 times weekly for 8 weeks to  include the following interventions: Electrical Stimulation  , Gait Training, Manual Therapy, Moist Heat/ Ice, Neuromuscular Re-ed, Paraffin, Patient Education, Self Care, Therapeutic Activities, Therapeutic Exercise, and dry needling.     Marlyn Ozuna, PT

## 2023-07-18 ENCOUNTER — CLINICAL SUPPORT (OUTPATIENT)
Dept: REHABILITATION | Facility: HOSPITAL | Age: 68
End: 2023-07-18
Payer: MEDICARE

## 2023-07-18 DIAGNOSIS — R26.89 DECREASED FUNCTIONAL MOBILITY: ICD-10-CM

## 2023-07-18 DIAGNOSIS — M62.81 MUSCLE WEAKNESS: ICD-10-CM

## 2023-07-18 DIAGNOSIS — M54.2 NECK PAIN: Primary | ICD-10-CM

## 2023-07-18 DIAGNOSIS — M25.60 DECREASED RANGE OF MOTION: ICD-10-CM

## 2023-07-18 PROCEDURE — 97530 THERAPEUTIC ACTIVITIES: CPT | Mod: PN

## 2023-07-18 PROCEDURE — 97112 NEUROMUSCULAR REEDUCATION: CPT | Mod: PN

## 2023-07-18 PROCEDURE — 97110 THERAPEUTIC EXERCISES: CPT | Mod: PN

## 2023-07-19 ENCOUNTER — OFFICE VISIT (OUTPATIENT)
Dept: FAMILY MEDICINE | Facility: CLINIC | Age: 68
End: 2023-07-19
Payer: MEDICARE

## 2023-07-19 VITALS
WEIGHT: 125.44 LBS | BODY MASS INDEX: 22.23 KG/M2 | HEIGHT: 63 IN | RESPIRATION RATE: 18 BRPM | DIASTOLIC BLOOD PRESSURE: 82 MMHG | HEART RATE: 84 BPM | SYSTOLIC BLOOD PRESSURE: 138 MMHG

## 2023-07-19 DIAGNOSIS — Z98.1 STATUS POST CERVICAL SPINAL FUSION: ICD-10-CM

## 2023-07-19 DIAGNOSIS — J44.9 CHRONIC OBSTRUCTIVE PULMONARY DISEASE, UNSPECIFIED COPD TYPE: ICD-10-CM

## 2023-07-19 DIAGNOSIS — F02.A4 MILD DEMENTIA ASSOCIATED WITH OTHER UNDERLYING DISEASE, WITH ANXIETY: ICD-10-CM

## 2023-07-19 DIAGNOSIS — M48.02 STENOSIS OF CERVICAL SPINE WITH MYELOPATHY: ICD-10-CM

## 2023-07-19 DIAGNOSIS — J43.2 CENTRILOBULAR EMPHYSEMA: ICD-10-CM

## 2023-07-19 DIAGNOSIS — G99.2 STENOSIS OF CERVICAL SPINE WITH MYELOPATHY: ICD-10-CM

## 2023-07-19 DIAGNOSIS — I10 ESSENTIAL HYPERTENSION: Primary | ICD-10-CM

## 2023-07-19 DIAGNOSIS — M81.0 OSTEOPOROSIS, UNSPECIFIED OSTEOPOROSIS TYPE, UNSPECIFIED PATHOLOGICAL FRACTURE PRESENCE: ICD-10-CM

## 2023-07-19 PROCEDURE — 99999 PR PBB SHADOW E&M-EST. PATIENT-LVL IV: CPT | Mod: PBBFAC,,, | Performed by: FAMILY MEDICINE

## 2023-07-19 PROCEDURE — 99214 OFFICE O/P EST MOD 30 MIN: CPT | Mod: PBBFAC,PO | Performed by: FAMILY MEDICINE

## 2023-07-19 PROCEDURE — 99999 PR PBB SHADOW E&M-EST. PATIENT-LVL IV: ICD-10-PCS | Mod: PBBFAC,,, | Performed by: FAMILY MEDICINE

## 2023-07-19 PROCEDURE — 99214 PR OFFICE/OUTPT VISIT, EST, LEVL IV, 30-39 MIN: ICD-10-PCS | Mod: S$PBB,,, | Performed by: FAMILY MEDICINE

## 2023-07-19 PROCEDURE — 99214 OFFICE O/P EST MOD 30 MIN: CPT | Mod: S$PBB,,, | Performed by: FAMILY MEDICINE

## 2023-07-19 RX ORDER — NIACINAMIDE 500 MG
500 TABLET ORAL NIGHTLY
COMMUNITY

## 2023-07-19 RX ORDER — TIOTROPIUM BROMIDE INHALATION SPRAY 1.56 UG/1
2 SPRAY, METERED RESPIRATORY (INHALATION) DAILY
Qty: 4 G | Refills: 11 | Status: SHIPPED | OUTPATIENT
Start: 2023-07-19 | End: 2024-01-19

## 2023-07-19 RX ORDER — HYDROCODONE BITARTRATE AND ACETAMINOPHEN 5; 325 MG/1; MG/1
1 TABLET ORAL EVERY 12 HOURS PRN
Qty: 21 TABLET | Refills: 0 | Status: SHIPPED | OUTPATIENT
Start: 2023-07-19 | End: 2023-07-20 | Stop reason: SDUPTHER

## 2023-07-19 NOTE — PROGRESS NOTES
"Subjective:       Patient ID: Nichelle Barone is a 68 y.o. female.    Chief Complaint: Follow-up and Neck Pain (Fell in Feb. 2023 caused neck injury. )    Here today to follow up on chronic medical conditions.     Neck Pain:  s/p surgery for fracture.  She still wears the c-collar PRN.  Using Bone stimulator.  Continues to see NS. She had a bone density which showed osteoporosis. States she continues to have pain.  States her Neurosurgeon would only write her pain medication for a set amount of time.  She had norco written by him in June.  HTN:  She was on Benicar in the past.  She is diet controlled  Chronic Diarrhea:  She is on Welchol.  Seeing a functional medicine doctor  Dementia with psychosis:  Was hospitalized in Aug.  On Zypexa and doing very well.  COPD:  continues to smoke.  Was on spiriva in the past.        Follow-up  Associated symptoms include neck pain. Pertinent negatives include no abdominal pain, chest pain, coughing, fatigue, fever, headaches, nausea or rash.   Neck Pain   Pertinent negatives include no chest pain, fever or headaches.   Review of Systems   Constitutional:  Negative for activity change, appetite change, fatigue and fever.   Respiratory:  Negative for cough, shortness of breath and wheezing.    Cardiovascular:  Negative for chest pain and palpitations.   Gastrointestinal:  Negative for abdominal pain, constipation, diarrhea and nausea.   Musculoskeletal:  Positive for neck pain.   Skin:  Negative for pallor and rash.   Neurological:  Negative for dizziness, syncope, light-headedness and headaches.   Psychiatric/Behavioral:  The patient is not nervous/anxious.      Objective:      Vitals:    07/19/23 1511   BP: 138/82   Pulse: 84   Resp: 18   Weight: 56.9 kg (125 lb 7.1 oz)   Height: 5' 3" (1.6 m)      Physical Exam  Constitutional:       General: She is not in acute distress.     Comments: Strong tobacco odor   Neck:      Comments: C-collar in place  Cardiovascular:      Rate and " Rhythm: Normal rate and regular rhythm.      Heart sounds: Normal heart sounds. No murmur heard.  Pulmonary:      Effort: Pulmonary effort is normal. No respiratory distress.      Breath sounds: Normal breath sounds. Decreased air movement present. No wheezing, rhonchi or rales.   Neurological:      General: No focal deficit present.      Mental Status: She is alert.   Psychiatric:         Mood and Affect: Mood normal.         Behavior: Behavior normal.         Thought Content: Thought content normal.       Results for orders placed or performed in visit on 07/12/23   Comprehensive Metabolic Panel   Result Value Ref Range    Sodium 142 136 - 145 mmol/L    Potassium 4.3 3.5 - 5.1 mmol/L    Chloride 104 95 - 110 mmol/L    CO2 28 23 - 29 mmol/L    Glucose 96 70 - 110 mg/dL    BUN 8 8 - 23 mg/dL    Creatinine 1.0 0.5 - 1.4 mg/dL    Calcium 10.1 8.7 - 10.5 mg/dL    Total Protein 7.8 6.0 - 8.4 g/dL    Albumin 4.1 3.5 - 5.2 g/dL    Total Bilirubin 0.5 0.1 - 1.0 mg/dL    Alkaline Phosphatase 82 55 - 135 U/L    AST 24 10 - 40 U/L    ALT 19 10 - 44 U/L    eGFR >60.0 >60 mL/min/1.73 m^2    Anion Gap 10 8 - 16 mmol/L   Lipid Panel   Result Value Ref Range    Cholesterol 181 120 - 199 mg/dL    Triglycerides 121 30 - 150 mg/dL    HDL 50 40 - 75 mg/dL    LDL Cholesterol 106.8 63.0 - 159.0 mg/dL    HDL/Cholesterol Ratio 27.6 20.0 - 50.0 %    Total Cholesterol/HDL Ratio 3.6 2.0 - 5.0    Non-HDL Cholesterol 131 mg/dL      Assessment:       1. Essential hypertension    2. Osteoporosis, unspecified osteoporosis type, unspecified pathological fracture presence    3. Stenosis of cervical spine with myelopathy    4. Mild dementia associated with other underlying disease, with anxiety    5. Status post cervical spinal fusion    6. Chronic obstructive pulmonary disease, unspecified COPD type    7. Centrilobular emphysema        Plan:       Essential hypertension  Controlled  Osteoporosis, unspecified osteoporosis type, unspecified  pathological fracture presence  Discussed medication and she would like to think about it  Stenosis of cervical spine with myelopathy  -     Ambulatory referral/consult to Pain Clinic; Future; Expected date: 07/26/2023  Needs to see pain mgt.  She understands that I am unable to continue to write her pain medication.  Mild dementia associated with other underlying disease, with anxiety    Status post cervical spinal fusion  -     HYDROcodone-acetaminophen (NORCO) 5-325 mg per tablet; Take 1 tablet by mouth every 12 (twelve) hours as needed for Pain.  Dispense: 21 tablet; Refill: 0  Will cover her pain medication for a short term until she can be seen by pain mgt  Chronic obstructive pulmonary disease, unspecified COPD type  -     tiotropium bromide (SPIRIVA RESPIMAT) 1.25 mcg/actuation inhaler; Inhale 2 puffs into the lungs once daily. Controller  Dispense: 4 g; Refill: 11    Centrilobular emphysema  -     tiotropium bromide (SPIRIVA RESPIMAT) 1.25 mcg/actuation inhaler; Inhale 2 puffs into the lungs once daily. Controller  Dispense: 4 g; Refill: 11  Restart Spiriva today        Medication List with Changes/Refills   New Medications    TIOTROPIUM BROMIDE (SPIRIVA RESPIMAT) 1.25 MCG/ACTUATION INHALER    Inhale 2 puffs into the lungs once daily. Controller   Current Medications    COLESEVELAM (WELCHOL) 625 MG TABLET    Take by mouth 2 (two) times daily with meals.    DOCUSATE SODIUM (COLACE) 100 MG CAPSULE    Take 100 mg by mouth 3 (three) times daily as needed for Constipation.    ENZYMES,DIGESTIVE (DIGESTIVE ENZYMES ORAL)    Take 1 tablet by mouth 2 (two) times a day.    ERGOCALCIFEROL (ERGOCALCIFEROL) 50,000 UNIT CAP    Take 50,000 Units by mouth every 7 days.    NIACINAMIDE 500 MG TAB    Take 500 mg by mouth every evening.    OLANZAPINE (ZYPREXA) 5 MG TABLET    Take 1 tablet (5 mg total) by mouth every evening.    TIZANIDINE (ZANAFLEX) 4 MG TABLET    TAKE 1 TABLET (4 MG TOTAL) BY MOUTH EVERY 8 HOURS FOR 10 DAYS    Changed and/or Refilled Medications    Modified Medication Previous Medication    HYDROCODONE-ACETAMINOPHEN (NORCO) 5-325 MG PER TABLET HYDROcodone-acetaminophen (NORCO) 5-325 mg per tablet       Take 1 tablet by mouth every 12 (twelve) hours as needed for Pain.    Take 1 tablet by mouth every 6 (six) hours as needed for Pain.   Discontinued Medications    DIPHENHYDRAMINE (BENADRYL) 50 MG CAPSULE    Take 1 capsule (50 mg total) by mouth On call Procedure for Itching or Allergies (take one hour prior to scan).    OXYCODONE-ACETAMINOPHEN (PERCOCET) 5-325 MG PER TABLET    Take 1 tablet by mouth every 6 (six) hours as needed (pain 4-5/10).    OXYCODONE-ACETAMINOPHEN (PERCOCET) 5-325 MG PER TABLET    Take 1 tablet by mouth every 6 (six) hours as needed for Pain.    PYRIDOXINE, VITAMIN B6, (B-6) 50 MG TAB    Take 100 mg by mouth once daily.

## 2023-07-20 ENCOUNTER — TELEPHONE (OUTPATIENT)
Dept: FAMILY MEDICINE | Facility: CLINIC | Age: 68
End: 2023-07-20
Payer: MEDICARE

## 2023-07-20 DIAGNOSIS — Z98.1 STATUS POST CERVICAL SPINAL FUSION: ICD-10-CM

## 2023-07-20 RX ORDER — HYDROCODONE BITARTRATE AND ACETAMINOPHEN 5; 325 MG/1; MG/1
1 TABLET ORAL EVERY 12 HOURS PRN
Qty: 21 TABLET | Refills: 0 | OUTPATIENT
Start: 2023-07-20

## 2023-07-20 RX ORDER — HYDROCODONE BITARTRATE AND ACETAMINOPHEN 5; 325 MG/1; MG/1
1 TABLET ORAL EVERY 12 HOURS PRN
Qty: 28 TABLET | Refills: 0 | Status: SHIPPED | OUTPATIENT
Start: 2023-07-20 | End: 2023-08-03

## 2023-07-20 NOTE — TELEPHONE ENCOUNTER
----- Message from Linda Plata MA sent at 7/19/2023  3:55 PM CDT -----  Type:  Pharmacy Calling to Clarify an RX    Name of Caller:  Piper  Pharmacy Name:    [facility] - [place] - [address]  [address]  [place] [address]  Phone: [phone] Fax: [phone]  Prescription Name:  hydrocozione  What do they need to clarify?:  need to say greater then 7 days medically necessary   Additional Information:  please advise

## 2023-07-20 NOTE — TELEPHONE ENCOUNTER
----- Message from Ramona Toledo sent at 7/20/2023 11:20 AM CDT -----  Contact: Patient  Type:  Needs Medical Advice    Who Called: Patient       Pharmacy name and phone #:    [facility] - [place] - [address]  [address]  [place] [address]  Phone: [phone] Fax: [phone]      Would the patient rather a call back or a response via [facility]? Call    Best Call Back Number: [phone] (home)     Additional Information: Patient states the pharmacy informed them that the script was sent in wrong and needs to be sent again.     HYDROcodone-acetaminophen (NORCO) 5-325 mg per tablet  Please resend and advise

## 2023-07-20 NOTE — TELEPHONE ENCOUNTER
No care due was identified.  [facility] Embedded Care Due Messages. Reference number: [id number].   7/20/2023 11:18:49 AM CDT

## 2023-07-20 NOTE — TELEPHONE ENCOUNTER
At every 12 hours, #21 = 10 day supply.     Pharmacy need >than 7 day supply medically necessary added to script.

## 2023-07-24 ENCOUNTER — DOCUMENTATION ONLY (OUTPATIENT)
Dept: FAMILY MEDICINE | Facility: CLINIC | Age: 68
End: 2023-07-24
Payer: MEDICARE

## 2023-07-26 ENCOUNTER — CLINICAL SUPPORT (OUTPATIENT)
Dept: REHABILITATION | Facility: HOSPITAL | Age: 68
End: 2023-07-26
Payer: MEDICARE

## 2023-07-26 DIAGNOSIS — M25.60 DECREASED RANGE OF MOTION: ICD-10-CM

## 2023-07-26 DIAGNOSIS — R26.89 DECREASED FUNCTIONAL MOBILITY: ICD-10-CM

## 2023-07-26 DIAGNOSIS — M54.2 NECK PAIN: Primary | ICD-10-CM

## 2023-07-26 DIAGNOSIS — M62.81 MUSCLE WEAKNESS: ICD-10-CM

## 2023-07-26 PROCEDURE — 97110 THERAPEUTIC EXERCISES: CPT | Mod: PN,CQ

## 2023-07-26 PROCEDURE — 97112 NEUROMUSCULAR REEDUCATION: CPT | Mod: PN,CQ

## 2023-07-26 PROCEDURE — 97530 THERAPEUTIC ACTIVITIES: CPT | Mod: PN,CQ

## 2023-07-26 NOTE — PROGRESS NOTES
[facility]   Physical Therapy Treatment Note      Name: Nichelle Barone  [facility] Number: [id number]    Therapy Diagnosis:   Encounter Diagnoses   Name Primary?    Neck pain Yes    Decreased range of motion     Muscle weakness     Decreased functional mobility        Physician: Soto Heller DO    Visit Date: 7/26/2023    Physician Orders: PT Eval and Treat gait training  Medical Diagnosis from Referral: Status post cervical spinal fusion  Evaluation Date: 6/7/2023  Authorization Period Expiration: 12/31/23  Plan of Care Expiration: 8/4/23 (reassessed on 7/5/23)  Visit # / Visits authorized: 9/ 20    Time In: 1350   Time Out: 1438   Total Time: 48 minutes  Total Billable Time: 48 minutes      Precautions: Standard, osteoporosis, dementia    s/p Procedures: (3/28/23)  1. Anterior cervical diskectomy, decompression of spinal cord, bilateral foraminotomy C5-6, C6-7  2. Anterior cervical arthrodesis C5-6, C6-7  3. Insertion of biomechanical device C5-6 C6-7  4. Anterior cervical plating C5-C7   5. Harvesting local autograft, implantation of cell based allograft   6. Intraoperative neuro monitoring    PTA Visit #: 1/5     Subjective     Pt reports: took pain pill this morning 2* neck and toothache pain. Appt Monday for pulling teeth at dentist. States she sometimes performs bruegger's in SLS at home.     She was compliant with home exercise program. Two to three times per day.  Response to previous treatment: Sore  Functional change: easier to fix her hair and shave her armpits    Pain: 7/10  Location:  left posterior neck and shoulder     Objective Measures updated at progress report unless specified.    Treatment     Nichelle received the treatments listed below:      therapeutic exercises to develop strength, ROM, flexibility, and posture for 14 minutes including:  Supine nods x10, 5 sec hold  Supine cervical AROM rotation x10, 5 sec hold  Supine scap protraction 2# 2x10  Seated cervical  isometrics (flex, SB) x10 5 sec hold  Seated scap retraction into towel roll 2x10, 5 sec hold  Posterior sh rolls x20  Corner pec stretch 3x20 sec    NP (time):   Seated Gentle cervical AROM rotation x10, 5 sec hold  Gripping x1 min ea with hand gripper rung 3     Neuromuscular re-ed x 14 minutes to improve postural sense to include: (vc and mc to avoid UT compensation)  Seated row w/ blue tubing (therapist holding band) 2 x10  Seated shoulder extension w/ yellow tubing x15  Seated Bruegger's RTB 2x10      Manual therapy techniques: Soft tissue Mobilization were applied to the: cervical muscles for 10 minutes, including:  STM to cervical paraspinals, UT, and suboccipitals    Therapeutic activities x 10 minutes:  SLS on foam 2x30 sec ea -CGA  Fitter board Fwd/back, L/R x10  Tandem walking in parallel bars x 2 laps  Backward walking in parallel bars x 2 laps  Sidestepping in parallel bars x 2 laps  NP Dynamic standing balance blue sport cord x3 laps ea in // bars (A, P, lateral) x 5 each -minimal A  Tandem stance on blue foam 2x30s    Patient Education and Home Exercises       Education provided:   - perform exercises in pain free ROM  Pt gave verbal understanding to all education provided     Written Home Exercises Provided: pt instructed to continue previous HEP.      Exercises were reviewed and Nichelle was able to demonstrate them prior to the end of the session.  Nichelle demonstrated good  understanding of the education provided. See EMR under Patient Instructions for exercises provided during therapy sessions  6/14/23    Assessment     Continued high levels of pain. Will have dental work Monday. Difficulty relaxing bilateral UT today but improved after manual therapy. Reminder to keep fingers off temples with isometrics. Instructed pt to NOT perform bruegger's in SLS for safety reasons, as well as keeping focus on periscapular activation with brugger's and UT relaxation. Progressed balance training in parallel bars  with occasional light UE support with tandem walking, and no UE support with backward and sidestepping. Able to progress with increased reps of resistance exercises without pain exacerbation. Pain after session 6.5/10. Continued left cervical rotation limitation.  She will continue to benefit from PT for improved cervical ROM, postural stability, and balance for improved functional mobility.     Nichelle Is progressing well towards her goals.   Pt prognosis is Good.     Pt will continue to benefit from skilled outpatient physical therapy to address the deficits listed in the problem list box on initial evaluation, provide pt/family education and to maximize pt's level of independence in the home and community environment.     Pt's spiritual, cultural and educational needs considered and pt agreeable to plan of care and goals.     Anticipated barriers to physical therapy: dependent of transportation, dementia    Goals:   Short Term Goals: 4 weeks   1.Report decreased neck pain  <   / =  5  /10  to increase tolerance for ADLs  2. Increased L cervical rotation to at least 45 deg for increased ease with ADLs. (MET)  3. Increased R UE strength by 1/3 muscle grade in all deficient planes to increase tolerance for ADLs. (Partially met)  4.  Increased L UE strength by 1/3 muscle grade in all deficient planes to increase tolerance for ADLs. (Partially met)   5. Pt to tolerate HEP to improve ROM and independence with ADL's (MET)  6. Increased cervical R side bending to at least 20 deg for increased ease with ADLs. (MET)  7. Increased cervical muscle strength by 1/3 muscle grade in all deficient planes for increased ease with ADLs. (MET)    Long Term Goals: 8 weeks (progressing, not met)  1.Report decreased neck pain  <   / =  3  /10  to increase tolerance for ADLs.  2.increased cervical L rotation AROM to at least 55 deg for increased ease with ADLs.  3.Increased R UE strength by 1 muscle grade in all deficient planes  to  increase tolerance for ADL and work activities.  4. Increased L UE strength by 1 muscle grade in all deficient planes  to increase tolerance for ADL and work activities.  5. Pt to be Independent with HEP to improve ROM and independence with ADL's  6. Increased cervical muscle strength by 1 muscle grade in all deficient planes for increased ease with ADLs.  Unmet goals remain appropriate within 4 weeks  Plan     Continue PT towards established goals.  Progress cervical ROM and postural strength as tolerated.  May add balance/gait training.    Plan of care Certification: 6/7/2023 to 8/4/23.    Outpatient Physical Therapy 2 times weekly for 8 weeks to include the following interventions: Electrical Stimulation  , Gait Training, Manual Therapy, Moist Heat/ Ice, Neuromuscular Re-ed, Paraffin, Patient Education, Self Care, Therapeutic Activities, Therapeutic Exercise, and dry needling.     Vivi Louise, PTA

## 2023-07-27 ENCOUNTER — TELEPHONE (OUTPATIENT)
Dept: NEUROSURGERY | Facility: CLINIC | Age: 68
End: 2023-07-27
Payer: MEDICARE

## 2023-07-27 ENCOUNTER — PATIENT MESSAGE (OUTPATIENT)
Dept: ADMINISTRATIVE | Facility: HOSPITAL | Age: 68
End: 2023-07-27
Payer: MEDICARE

## 2023-07-27 NOTE — TELEPHONE ENCOUNTER
----- Message from Stacey M Lefort sent at 7/27/2023  2:44 PM CDT -----  Olivia from [facility] is faxing you some paperwork that they need back in order that the patient can have some dental work done. She can be reached at [phone] option 2. Thank you.

## 2023-08-03 ENCOUNTER — TELEPHONE (OUTPATIENT)
Dept: NEUROSURGERY | Facility: CLINIC | Age: 68
End: 2023-08-03
Payer: MEDICARE

## 2023-08-03 NOTE — TELEPHONE ENCOUNTER
Returned call to pt's spouse. He reports that pt had all of her teeth pulled recently and now has an infection. He reports that pt is not well enough to attend her appointment today. Reschedule appt. He voiced understanding to new appt details.

## 2023-08-03 NOTE — TELEPHONE ENCOUNTER
----- Message from Stacey M Lefort sent at 8/3/2023  1:37 PM CDT -----  Pt is requesting a callback - he said they will need to reschedule. The callback is [phone]. Thank you.

## 2023-08-04 ENCOUNTER — PATIENT MESSAGE (OUTPATIENT)
Dept: ADMINISTRATIVE | Facility: HOSPITAL | Age: 68
End: 2023-08-04
Payer: MEDICARE

## 2023-08-15 ENCOUNTER — CLINICAL SUPPORT (OUTPATIENT)
Dept: REHABILITATION | Facility: HOSPITAL | Age: 68
End: 2023-08-15
Payer: MEDICARE

## 2023-08-15 DIAGNOSIS — R26.89 DECREASED FUNCTIONAL MOBILITY: ICD-10-CM

## 2023-08-15 DIAGNOSIS — M25.60 DECREASED RANGE OF MOTION: ICD-10-CM

## 2023-08-15 DIAGNOSIS — M62.81 MUSCLE WEAKNESS: ICD-10-CM

## 2023-08-15 DIAGNOSIS — M54.2 NECK PAIN: Primary | ICD-10-CM

## 2023-08-15 PROCEDURE — 97112 NEUROMUSCULAR REEDUCATION: CPT | Mod: PN

## 2023-08-15 PROCEDURE — 97140 MANUAL THERAPY 1/> REGIONS: CPT | Mod: PN

## 2023-08-15 PROCEDURE — 97110 THERAPEUTIC EXERCISES: CPT | Mod: PN

## 2023-08-15 NOTE — PROGRESS NOTES
[facility]   Physical Therapy Treatment Note      Name: Nichelle Barone  [facility] Number: [id number]    Therapy Diagnosis:   Encounter Diagnoses   Name Primary?    Neck pain Yes    Decreased range of motion     Muscle weakness     Decreased functional mobility          Physician: Soto Heller DO    Visit Date: 8/15/2023    Physician Orders: PT Eval and Treat gait training  Medical Diagnosis from Referral: Status post cervical spinal fusion  Evaluation Date: 6/7/2023  Authorization Period Expiration: 12/31/23  Plan of Care Expiration: 8/4/23 (reassessed on 7/5/23)  Visit # / Visits authorized: 11/ 20    Time In: 11:30   Time Out: 12:15   Total Time: 45 minutes  Total Billable Time: 45 minutes      Precautions: Standard, osteoporosis, dementia    s/p Procedures: (3/28/23)  1. Anterior cervical diskectomy, decompression of spinal cord, bilateral foraminotomy C5-6, C6-7  2. Anterior cervical arthrodesis C5-6, C6-7  3. Insertion of biomechanical device C5-6 C6-7  4. Anterior cervical plating C5-C7   5. Harvesting local autograft, implantation of cell based allograft   6. Intraoperative neuro monitoring    PTA Visit #: 1/5     Subjective     Pt reports: she still has pain in the mouth after having 18 teeth pulled and replaced.      She was somewhat compliant with home exercise program.   Response to previous treatment: Sore  Functional change: easier to fix her hair and shave her armpits    Pain: 6-7/10  Location:  left posterior neck and shoulder     Objective:  Cervical Range of Motion:     Degrees Pain   Flexion 44 no      Extension 32 no      Right Rotation 60 no      Left Rotation 45 Pain on L side of neck      Right Side Bending 30 no   Left Side Bending 32 Pulling on R      Shoulder Range of Motion:   Shoulder Right Left   Flexion WFL WFL   Abduction WFL WFL   ER WFL WFL   IR T10 T7      Strength:  Cervical MMT   Flexion 5/5   Extension 4+/5   Right Side Bend 5/5   Left Side Bend 5/5       Upper Extremity Strength  (R) UE   (L) UE     Shoulder flexion: 5/5 Shoulder flexion: 5/5   Shoulder Abduction: 4+/5 Shoulder abduction: 4+/5   Shoulder ER 4/5 Shoulder ER 4+/5   Shoulder IR 4+/5 Shoulder IR 5/5   Elbow flexion: 5/5 Elbow flexion: 5/5   Elbow extension: 5/5 Elbow extension: 5/5       strength (Patel, L2, avg of 3 trials in lbs.)  R: 60             L: 45     SLS: 30s bilaterally     Treatment     Nichelle received the treatments listed below:      therapeutic exercises to develop strength, ROM, flexibility, and posture for 25 minutes including:  Reassessment  Supine nods x10, 5 sec hold  Supine cervical AROM rotation x10, 5 sec hold  Supine scap protraction 2# 2x10  Seated cervical isometrics (flex, ext, SB) x10 5 sec hold  Seated scap retraction into towel roll 2x10, 5 sec hold  NP Posterior sh rolls x20  NP Corner pec stretch 3x20 sec    NP (time):   Seated Gentle cervical AROM rotation x10, 5 sec hold  Gripping x1 min ea with hand gripper rung 3     Neuromuscular re-ed x 10 minutes to improve postural sense to include: (vc and mc to avoid UT compensation)  Seated row w/ blue tubing (therapist holding band) 2 x10  Seated shoulder extension w/ yellow tubing x15  NP Seated Bruegger's RTB 2x10      Manual therapy techniques: Soft tissue Mobilization were applied to the: cervical muscles for 10 minutes, including:  STM to cervical paraspinals, UT, and suboccipitals    Not performed due to time: Therapeutic activities x 00 minutes:  SLS on foam 2x30 sec ea -[facility]  Fitter board Fwd/back, L/R x10  Tandem walking in parallel bars x 2 laps  Backward walking in parallel bars x 2 laps  Sidestepping in parallel bars x 2 laps  NP Dynamic standing balance blue sport cord x3 laps ea in // bars (A, P, lateral) x 5 each -minimal A  Tandem stance on blue foam 2x30s    Patient Education and Home Exercises       Education provided:   - perform exercises in pain free ROM  Pt gave verbal understanding to all  education provided     Written Home Exercises Provided: pt instructed to continue previous HEP.      Exercises were reviewed and Nichelle was able to demonstrate them prior to the end of the session.  Nichelle demonstrated good  understanding of the education provided. See EMR under Patient Instructions for exercises provided during therapy sessions  6/14/23    Assessment     Pt reassessed today.  She has improved with increased cervical and UE strength.  She still has some decreased L cervical rotation ROM.  She demonstrated good SLS balance.  She missed her last 2 visits due to extensive dental work.  She will follow up with MD on Thursday.      Nichelle Is progressing well towards her goals.   Pt prognosis is Good.     Pt will continue to benefit from skilled outpatient physical therapy to address the deficits listed in the problem list box on initial evaluation, provide pt/family education and to maximize pt's level of independence in the home and community environment.     Pt's spiritual, cultural and educational needs considered and pt agreeable to plan of care and goals.     Anticipated barriers to physical therapy: dependent of transportation, dementia    Goals:   Short Term Goals: 4 weeks   1.Report decreased neck pain  <   / =  5  /10  to increase tolerance for ADLs  2. Increased L cervical rotation to at least 45 deg for increased ease with ADLs. (MET)  3. Increased R UE strength by 1/3 muscle grade in all deficient planes to increase tolerance for ADLs. (met)  4.  Increased L UE strength by 1/3 muscle grade in all deficient planes to increase tolerance for ADLs. (met)   5. Pt to tolerate HEP to improve ROM and independence with ADL's (MET)  6. Increased cervical R side bending to at least 20 deg for increased ease with ADLs. (MET)  7. Increased cervical muscle strength by 1/3 muscle grade in all deficient planes for increased ease with ADLs. (MET)    Long Term Goals: 8 weeks (progressing, not met)  1.Report  decreased neck pain  <   / =  3  /10  to increase tolerance for ADLs.  2.increased cervical L rotation AROM to at least 55 deg for increased ease with ADLs.  3.Increased R UE strength by 1 muscle grade in all deficient planes  to increase tolerance for ADL and work activities. (Partially met)  4. Increased L UE strength by 1 muscle grade in all deficient planes  to increase tolerance for ADL and work activities. (Partially met)  5. Pt to be Independent with HEP to improve ROM and independence with ADL's (met)  6. Increased cervical muscle strength by 1 muscle grade in all deficient planes for increased ease with ADLs. (Met)  Unmet goals remain appropriate within 4 weeks  Plan     Continue PT towards established goals.  Progress cervical ROM and postural strength as tolerated.  May add balance/gait training.    Plan of care Certification: 6/7/2023 to 8/4/23.    Outpatient Physical Therapy 2 times weekly for 8 weeks to include the following interventions: Electrical Stimulation  , Gait Training, Manual Therapy, Moist Heat/ Ice, Neuromuscular Re-ed, Paraffin, Patient Education, Self Care, Therapeutic Activities, Therapeutic Exercise, and dry needling.     Marlyn Ozuna, PT

## 2023-08-17 ENCOUNTER — HOSPITAL ENCOUNTER (OUTPATIENT)
Dept: RADIOLOGY | Facility: HOSPITAL | Age: 68
Discharge: HOME | End: 2023-08-17
Attending: NEUROLOGICAL SURGERY
Payer: MEDICARE

## 2023-08-17 ENCOUNTER — OFFICE VISIT (OUTPATIENT)
Dept: NEUROSURGERY | Facility: CLINIC | Age: 68
End: 2023-08-17
Payer: MEDICARE

## 2023-08-17 VITALS
BODY MASS INDEX: 22.15 KG/M2 | DIASTOLIC BLOOD PRESSURE: 97 MMHG | HEART RATE: 105 BPM | HEIGHT: 63 IN | SYSTOLIC BLOOD PRESSURE: 142 MMHG | WEIGHT: 125 LBS

## 2023-08-17 DIAGNOSIS — Z98.1 STATUS POST CERVICAL SPINAL FUSION: ICD-10-CM

## 2023-08-17 DIAGNOSIS — Z98.1 STATUS POST CERVICAL SPINAL FUSION: Primary | ICD-10-CM

## 2023-08-17 PROCEDURE — 72040 XR CERVICAL SPINE AP LATERAL: ICD-10-PCS | Mod: 26,,, | Performed by: RADIOLOGY

## 2023-08-17 PROCEDURE — 99213 PR OFFICE/OUTPT VISIT, EST, LEVL III, 20-29 MIN: ICD-10-PCS | Mod: S$PBB,,, | Performed by: NEUROLOGICAL SURGERY

## 2023-08-17 PROCEDURE — 72040 X-RAY EXAM NECK SPINE 2-3 VW: CPT | Mod: 26,,, | Performed by: RADIOLOGY

## 2023-08-17 PROCEDURE — 99213 OFFICE O/P EST LOW 20 MIN: CPT | Mod: S$PBB,,, | Performed by: NEUROLOGICAL SURGERY

## 2023-08-17 PROCEDURE — 72040 X-RAY EXAM NECK SPINE 2-3 VW: CPT | Mod: TC

## 2023-08-17 NOTE — PROGRESS NOTES
The patient returns with routine postoperative cervical x-rays.  She is status post ACDF C5-7 performed uneventfully on March 20th 2023 for cervical stenosis with myelopathy.  She reports resolution of her severe left neck and left arm pain.  She endorses posterior cervical muscular tightness.  She has a history of osteoporosis and long-term smoking.  She reports that she is reduced her smoking to 3/4 of pack daily.  She continues wear the cervical orthosis daily.  She is also using a bone stimulator.  She reports chronic abdominal pain and right hip pain.  She is scheduled to see gastroenterology tomorrow.    Interval history August 17, 2023: She returns with routine cervical x-rays for review.  She denies new or worsened cervical spine symptoms.  She recently underwent multiple dental extractions and preprocedure antibiotics.  She reports dental pain.  She also reports increased chronic low back pain.  She denies leg pain weakness or numbness.  She denies abdominal pain today.  She notes that she continues to smoke daily.  She does report that she is been using the cervical bone stimulator regularly.    I personally reviewed x-rays cervical spine obtained today with the patient and her .  Implants remain stable.  There appears to be osseous bridging at the arthrodesis levels.  Alignment is maintained.      Exam:  No distress.  Well-groomed  Awake, alert, oriented to person place and time.    Cranial nerves 2-12 grossly intact.    Strength is graded 5/5 in all muscle groups.    Sensation is grossly intact throughout.    Gait and stance are normal  Right anterior cervical incision well healed  Cervical range of motion grossly full in all planes    Analysis:  She continues to do well postoperatively.  Implants remain stable and osseous bridging is now evident.  I encouraged her to continue using the bone stimulator until 1 year postoperatively.  She is aware that smoking may interfere with bony fusion.  We  will repeat her cervical spine x-rays in 6 months.  She may resume pain procedures as needed for chronic back pain.    Nichelle was seen today for follow-up.    Diagnoses and all orders for this visit:    Status post cervical spinal fusion

## 2023-09-22 ENCOUNTER — PATIENT OUTREACH (OUTPATIENT)
Dept: ADMINISTRATIVE | Facility: HOSPITAL | Age: 68
End: 2023-09-22
Payer: MEDICARE

## 2023-09-22 NOTE — PROGRESS NOTES
Population Health Chart Review & Patient Outreach Details:     Reason for Outreach Encounter:     [x]  Non-Compliant Report   []  Payor Report ([employer], PHN, BCBS, MSSP, MCIP, UHC, etc.)   []  Pre-Visit Chart Review     Updates Requested / Reviewed:     []  Care Everywhere    []     []  External Sources ([facility], [facility], DIS, etc.)   []  Care Team Updated    Patient Outreach Method:    [x]  Telephone Outreach Completed   [x] Successful   [] Left Voicemail   [] Unable to Contact (wrong number, no voicemail)  []  [employer] Portal Outreach Sent  []  Letter Outreach Mailed  []  Fax Sent for External Records  []  External Records Upload    Health Maintenance Topics Addressed and Outreach Outcomes / Actions Taken:        [x]      Breast Cancer Screening []  Mammo Scheduled      []  External Records Requested     []  Added Reminder to Complete to Upcoming Primary Care Appt Notes     [x]  Patient Declined     []  Patient Will Call Back to Schedule     []  Patient Will Schedule with External Provider / Order Routed if Applicable             []       Cervical Cancer Screening []  Pap Scheduled      []  External Records Requested     []  Added Reminder to Complete to Upcoming Primary Care Appt Notes     []  Patient Declined     []  Patient Will Call Back to Schedule     []  Patient Will Schedule with External Provider               [x]          Colorectal Cancer Screening []  Colonoscopy Case Request or Referral Placed     []  External Records Requested     []  Added Reminder to Complete to Upcoming Primary Care Appt Notes     [x]  Patient Declined     []  Patient Will Call Back to Schedule     []  Patient Will Schedule with External Provider     []  Fit Kit Mailed (add the SmartPhrase under additional notes)     []  Reminded Patient to Complete Home Test             []      Diabetic Eye Exam []  Eye Camera Scheduled or Optometry Referral Placed     []  External Records Requested     []  Added Reminder to Complete  to Upcoming Primary Care Appt Notes     []  Patient Declined     []  Patient Will Call Back to Schedule     []  Patient Will Schedule with External Provider             []      Blood Pressure Control []  Primary Care Follow Up Visit Scheduled     []  Remote Blood Pressure Reading Captured     []  Added Reminder to Complete to Upcoming Primary Care Appt Notes     []  Patient Declined     []  Patient Will Call Back / Patient Will Send Portal Message with Reading     []  Patient Will Call Back to Schedule Provider Visit             []       HbA1c & Other Labs []  Lab Appt Scheduled for Due Labs     []  Primary Care Follow Up Visit Scheduled      []  Reminded Patient to Complete Home Test     []  Added Reminder to Complete to Upcoming Primary Care Appt Notes     []  Patient Declined     []  Patient Will Call Back to Schedule     []  Patient Will Schedule with External Provider / Order Routed if Applicable           []    Schedule Primary Care Appt []  Primary Care Appt Scheduled     []  Patient Declined     []  Patient Will Call Back to Schedule     []  Pt Established with External Provider & Updated Care Team             []      Medication Adherence []  Primary Care Appointment Scheduled     []  Added Reminder to Upcoming Primary Care Appt Notes     []  Patient Reminded to  Prescription     []  Patient Declined, Provider Notified if Needed     []  Sent Provider Message to Review and/or Add Exclusion to Problem List             []      Osteoporosis Screening []  DXA Appointment Scheduled     []  External Records Requested     []  Added Reminder to Complete to Upcoming Primary Care Appt Notes     []  Patient Declined     []  Patient Will Call Back to Schedule     []  Patient Will Schedule with External Provider / Order Routed if Applicable     Additional Care Coordinator Notes:         Further Action Needed If Patient Returns Outreach:

## 2024-01-19 ENCOUNTER — OFFICE VISIT (OUTPATIENT)
Dept: FAMILY MEDICINE | Facility: CLINIC | Age: 69
End: 2024-01-19
Payer: MEDICARE

## 2024-01-19 VITALS
HEIGHT: 63 IN | DIASTOLIC BLOOD PRESSURE: 86 MMHG | HEART RATE: 108 BPM | OXYGEN SATURATION: 97 % | BODY MASS INDEX: 22.35 KG/M2 | SYSTOLIC BLOOD PRESSURE: 138 MMHG | WEIGHT: 126.13 LBS | TEMPERATURE: 98 F

## 2024-01-19 DIAGNOSIS — M48.02 STENOSIS OF CERVICAL SPINE WITH MYELOPATHY: ICD-10-CM

## 2024-01-19 DIAGNOSIS — G99.2 STENOSIS OF CERVICAL SPINE WITH MYELOPATHY: ICD-10-CM

## 2024-01-19 DIAGNOSIS — J44.9 CHRONIC OBSTRUCTIVE PULMONARY DISEASE, UNSPECIFIED COPD TYPE: ICD-10-CM

## 2024-01-19 DIAGNOSIS — I10 ESSENTIAL HYPERTENSION: Primary | ICD-10-CM

## 2024-01-19 DIAGNOSIS — F29 PSYCHOSIS, UNSPECIFIED PSYCHOSIS TYPE: ICD-10-CM

## 2024-01-19 DIAGNOSIS — F02.A4 MILD DEMENTIA ASSOCIATED WITH OTHER UNDERLYING DISEASE, WITH ANXIETY: ICD-10-CM

## 2024-01-19 PROCEDURE — 99999 PR PBB SHADOW E&M-EST. PATIENT-LVL IV: CPT | Mod: PBBFAC,,, | Performed by: FAMILY MEDICINE

## 2024-01-19 PROCEDURE — 99214 OFFICE O/P EST MOD 30 MIN: CPT | Mod: PBBFAC,PO | Performed by: FAMILY MEDICINE

## 2024-01-19 PROCEDURE — 99214 OFFICE O/P EST MOD 30 MIN: CPT | Mod: S$PBB,,, | Performed by: FAMILY MEDICINE

## 2024-01-19 RX ORDER — OLANZAPINE 5 MG/1
5 TABLET ORAL NIGHTLY
Qty: 90 TABLET | Refills: 3 | Status: SHIPPED | OUTPATIENT
Start: 2024-01-19 | End: 2025-01-18

## 2024-01-19 RX ORDER — TIOTROPIUM BROMIDE AND OLODATEROL 3.124; 2.736 UG/1; UG/1
1 SPRAY, METERED RESPIRATORY (INHALATION) DAILY
Qty: 4 G | Refills: 11 | Status: SHIPPED | OUTPATIENT
Start: 2024-01-19 | End: 2024-01-22 | Stop reason: SDUPTHER

## 2024-01-19 RX ORDER — MAGNESIUM GLUCONATE 27.5 (500)
TABLET ORAL ONCE
COMMUNITY

## 2024-01-19 NOTE — PROGRESS NOTES
"Subjective:       Patient ID: Nichelle Barone is a 68 y.o. female.    Chief Complaint: Hypertension    Here today to follow up on chronic medical conditions.  Here today with .    HTN:  She was on Benicar in the past.  She is currently diet controlled  Chronic Diarrhea:  She is on Welchol.  Seeing a functional medicine doctor.  Medication is working well for her  Dementia with psychosis:  Was hospitalized in Aug.  On Zypexa and doing very well.  COPD:  continues to smoke.  She is on spiriva which is costing her $48 per month,  is on stiolto which is $10.        Review of Systems   Constitutional:  Negative for activity change, appetite change, fatigue and fever.   Respiratory:  Negative for cough, shortness of breath and wheezing.    Cardiovascular:  Negative for chest pain and palpitations.   Gastrointestinal:  Negative for abdominal pain, constipation, diarrhea and nausea.   Skin:  Negative for pallor and rash.   Neurological:  Negative for dizziness, syncope, light-headedness and headaches.   Psychiatric/Behavioral:  The patient is not nervous/anxious.        Objective:      Vitals:    01/19/24 1455   BP: 138/86   Pulse: 108   Temp: 98.2 °F (36.8 °C)   TempSrc: Oral   SpO2: 97%   Weight: 57.2 kg (126 lb 1.7 oz)   Height: 5' 3" (1.6 m)      Physical Exam  Constitutional:       General: She is not in acute distress.  Cardiovascular:      Rate and Rhythm: Normal rate and regular rhythm.      Heart sounds: Normal heart sounds. No murmur heard.  Pulmonary:      Effort: Pulmonary effort is normal. No respiratory distress.      Breath sounds: Normal breath sounds. No wheezing, rhonchi or rales.   Neurological:      General: No focal deficit present.      Mental Status: She is alert.   Psychiatric:         Mood and Affect: Mood normal.         Behavior: Behavior normal.         Thought Content: Thought content normal.         Results for orders placed or performed in visit on 07/12/23   Comprehensive " Metabolic Panel   Result Value Ref Range    Sodium 142 136 - 145 mmol/L    Potassium 4.3 3.5 - 5.1 mmol/L    Chloride 104 95 - 110 mmol/L    CO2 28 23 - 29 mmol/L    Glucose 96 70 - 110 mg/dL    BUN 8 8 - 23 mg/dL    Creatinine 1.0 0.5 - 1.4 mg/dL    Calcium 10.1 8.7 - 10.5 mg/dL    Total Protein 7.8 6.0 - 8.4 g/dL    Albumin 4.1 3.5 - 5.2 g/dL    Total Bilirubin 0.5 0.1 - 1.0 mg/dL    Alkaline Phosphatase 82 55 - 135 U/L    AST 24 10 - 40 U/L    ALT 19 10 - 44 U/L    eGFR >60.0 >60 mL/min/1.73 m^2    Anion Gap 10 8 - 16 mmol/L   Lipid Panel   Result Value Ref Range    Cholesterol 181 120 - 199 mg/dL    Triglycerides 121 30 - 150 mg/dL    HDL 50 40 - 75 mg/dL    LDL Cholesterol 106.8 63.0 - 159.0 mg/dL    HDL/Cholesterol Ratio 27.6 20.0 - 50.0 %    Total Cholesterol/HDL Ratio 3.6 2.0 - 5.0    Non-HDL Cholesterol 131 mg/dL      Assessment:       1. Essential hypertension    2. Chronic obstructive pulmonary disease, unspecified COPD type    3. Mild dementia associated with other underlying disease, with anxiety    4. Stenosis of cervical spine with myelopathy    5. Psychosis, unspecified psychosis type        Plan:       Essential hypertension  -     Comprehensive Metabolic Panel; Future; Expected date: 07/19/2024  -     Lipid Panel; Future; Expected date: 07/19/2024  Controlled off medication  Chronic obstructive pulmonary disease, unspecified COPD type  -     tiotropium-olodateroL (STIOLTO RESPIMAT) 2.5-2.5 mcg/actuation Mist; Inhale 1 puff into the lungs once daily. Controller  Dispense: 4 g; Refill: 11  Change Spiriva to Stiloto.  Discussed need to quit smoking  Mild dementia associated with other underlying disease, with anxiety  -     OLANZapine (ZYPREXA) 5 MG tablet; Take 1 tablet (5 mg total) by mouth every evening.  Dispense: 90 tablet; Refill: 3    Stenosis of cervical spine with myelopathy  -     Ambulatory referral/consult to Pain Clinic; Future; Expected date: 01/26/2024    Psychosis, unspecified  psychosis type  -     OLANZapine (ZYPREXA) 5 MG tablet; Take 1 tablet (5 mg total) by mouth every evening.  Dispense: 90 tablet; Refill: 3          Medication List with Changes/Refills   New Medications    TIOTROPIUM-OLODATEROL (STIOLTO RESPIMAT) 2.5-2.5 MCG/ACTUATION MIST    Inhale 1 puff into the lungs once daily. Controller   Current Medications    COLESEVELAM (WELCHOL) 625 MG TABLET    Take by mouth 2 (two) times daily with meals.    DOCUSATE SODIUM (COLACE) 100 MG CAPSULE    Take 100 mg by mouth 3 (three) times daily as needed for Constipation.    ENZYMES,DIGESTIVE (DIGESTIVE ENZYMES ORAL)    Take 1 tablet by mouth 2 (two) times a day.    ERGOCALCIFEROL (ERGOCALCIFEROL) 50,000 UNIT CAP    Take 50,000 Units by mouth every 7 days.    MAGNESIUM GLUCONATE 27.5 MG MAGNE- SIUM (500 MG) TAB    Take by mouth once.    NIACINAMIDE 500 MG TAB    Take 500 mg by mouth every evening.    TIZANIDINE (ZANAFLEX) 4 MG TABLET    TAKE 1 TABLET (4 MG TOTAL) BY MOUTH EVERY 8 HOURS FOR 10 DAYS   Changed and/or Refilled Medications    Modified Medication Previous Medication    OLANZAPINE (ZYPREXA) 5 MG TABLET OLANZapine (ZYPREXA) 5 MG tablet       Take 1 tablet (5 mg total) by mouth every evening.    Take 1 tablet (5 mg total) by mouth every evening.   Discontinued Medications    TIOTROPIUM BROMIDE (SPIRIVA RESPIMAT) 1.25 MCG/ACTUATION INHALER    Inhale 2 puffs into the lungs once daily. Controller

## 2024-01-22 ENCOUNTER — TELEPHONE (OUTPATIENT)
Dept: FAMILY MEDICINE | Facility: CLINIC | Age: 69
End: 2024-01-22
Payer: MEDICARE

## 2024-01-22 DIAGNOSIS — J44.9 CHRONIC OBSTRUCTIVE PULMONARY DISEASE, UNSPECIFIED COPD TYPE: ICD-10-CM

## 2024-01-22 RX ORDER — TIOTROPIUM BROMIDE AND OLODATEROL 3.124; 2.736 UG/1; UG/1
2 SPRAY, METERED RESPIRATORY (INHALATION) DAILY
Qty: 4 G | Refills: 11 | Status: SHIPPED | OUTPATIENT
Start: 2024-01-22 | End: 2025-01-21

## 2024-01-22 NOTE — TELEPHONE ENCOUNTER
----- Message from Maryam Arguello sent at 1/22/2024  1:34 PM CST -----  Type:  Pharmacy Calling to Clarify an RX    Name of Caller:  Meaghan  Pharmacy Name:    [id number] - [place] [address] AT [address] [facility] & [address] [address]  Phone: [phone] Fax: [phone]  Prescription Name:  tiotropium-olodateroL (STIOLTO RESPIMAT) 2.5-2.5 mcg/actuation Mist  What do they need to clarify?:  directions-  recommends 2 puffs  Best Call Back Number: [phone]   Additional Information:

## 2024-01-22 NOTE — TELEPHONE ENCOUNTER
Please advise and send new script     Tiotropium-olodaterol        suggest 2 puffs not 1           Thank you

## 2024-02-27 DIAGNOSIS — Z00.00 ENCOUNTER FOR MEDICARE ANNUAL WELLNESS EXAM: ICD-10-CM

## 2024-03-01 ENCOUNTER — HOSPITAL ENCOUNTER (OUTPATIENT)
Dept: RADIOLOGY | Facility: HOSPITAL | Age: 69
Discharge: HOME | End: 2024-03-01
Attending: PAIN MEDICINE
Payer: MEDICARE

## 2024-03-01 DIAGNOSIS — M54.50 LOW BACK PAIN: ICD-10-CM

## 2024-03-01 PROCEDURE — 72110 X-RAY EXAM L-2 SPINE 4/>VWS: CPT | Mod: TC,FY,PO

## 2024-03-01 PROCEDURE — 72148 MRI LUMBAR SPINE W/O DYE: CPT | Mod: TC,PO

## 2024-03-01 PROCEDURE — 72148 MRI LUMBAR SPINE W/O DYE: CPT | Mod: 26,,, | Performed by: RADIOLOGY

## 2024-03-01 PROCEDURE — 72110 X-RAY EXAM L-2 SPINE 4/>VWS: CPT | Mod: 26,,, | Performed by: RADIOLOGY

## 2024-03-04 ENCOUNTER — CLINICAL SUPPORT (OUTPATIENT)
Dept: REHABILITATION | Facility: HOSPITAL | Age: 69
End: 2024-03-04
Payer: MEDICARE

## 2024-03-04 DIAGNOSIS — R26.89 DECREASED FUNCTIONAL MOBILITY: ICD-10-CM

## 2024-03-04 DIAGNOSIS — M62.81 MUSCLE WEAKNESS: ICD-10-CM

## 2024-03-04 DIAGNOSIS — M25.60 DECREASED RANGE OF MOTION: Primary | ICD-10-CM

## 2024-03-04 PROCEDURE — 97110 THERAPEUTIC EXERCISES: CPT | Mod: PN

## 2024-03-04 PROCEDURE — 97162 PT EVAL MOD COMPLEX 30 MIN: CPT | Mod: PN

## 2024-03-05 NOTE — PLAN OF CARE
[facility]  Physical Therapy Initial Evaluation    Name: Nichelle Barone  Clinic Number: [id number]    Therapy Diagnosis:   Encounter Diagnoses   Name Primary?    Decreased range of motion Yes    Muscle weakness     Decreased functional mobility      Physician: MONAE Reed MD    Physician Orders: PT Eval and Treat   Medical Diagnosis from Referral:   M54.50 (ICD-10-CM) - Low back pain, unspecified   M54.6 (ICD-10-CM) - Pain in thoracic spine     Evaluation Date: 3/4/2024  Authorization Period Expiration: 2/25/25  Plan of Care Expiration: 5/3/24  Visit # / Visits authorized: 1/ 1    Time In: 11:25  Time Out: 12:10  Total Billable Time: 45 minutes    Precautions: Standard, dementia    Subjective   Date of onset: chronic, pt not sure when low back pain started  History of current condition - Nichelle reports: pain for awhile to low back.  She had a recent MRI.  She has pain to lateral hips and thighs and feels like pins and needles.  She does not want to have surgery and just wants some relief from her low back pain     Medical History:   Past Medical History:   Diagnosis Date    Arthritis     Cancer     not sure    Coma 1990    ? 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    Medications:   Nichelle has a current medication list which includes the following prescription(s): colesevelam, docusate sodium, enzymes,digestive, ergocalciferol, lycopene, magnesium gluconate, niacinamide, olanzapine, stiolto respimat, and tizanidine.    Allergies:   Review of patient's allergies indicates:   Allergen Reactions    Demerol [meperidine] Nausea And Vomiting    Penicillins Nausea And Vomiting and Rash    Sulfa (sulfonamide antibiotics) Rash and Blisters    Bactrim [sulfamethoxazole-trimethoprim]      Not work for infection    Chantix [varenicline] Other (See Comments)     Tongue bleed,  and bleeding ulcer    Iodinated contrast media Itching     No swelling or rash; did good with allergy protocol    Keflex [cephalexin] Other (See Comments)     Works for day/or two, than infection back     Nicotine Dermatitis     Patch     Wellbutrin [bupropion hcl] Other (See Comments)     Tongue bleed, bleeding ulcer         Imaging, MRI studies: 1. There is degenerative change. There is no fracture. There is trace anterolisthesis of L4 on L5. There is bulging of the annulus at several levels in addition to facet joint arthropathy. These changes however do result in moderate to severe spinal canal and bilateral lateral recess stenosis at the L4-5 level where there is also moderate right and mild left foraminal stenosis. Milder findings are present at the remainder of the lumbar levels.     Prior Therapy: PT for neck  Social History: pt lives with their spouse on a boat  Occupation: retired  Prior Level of Function: I with ADLs  Current Level of Function: pain and difficulty with lifting    Sleep: constantly changing positions to get comfortable  Numbness/tingling: to lateral hips and thighs    Pain:  Current 7/10, worst 10/10, best 7/10   Location: bilateral low back  Description: Burning, Tight, and stabbing  Aggravating Factors: lifting, walk on rocking boat  Easing Factors: Tylenol, heat    Pts goals:  decreased pain, improved balance, increased strength        Objective     Observation: pt is pleasant and cooperative    Posture:  fwd head, rounded shoulders; level pelvis    Lumbar Range of Motion:    % Pain   Flexion 80   Tight HS        Extension 25   Pain to low back        Left Side Bending 75 Pain to low back        Right Side Bending 75 Pain to low back        Left rotation   75 no        Right Rotation   50 Pain to R low back             Lower Extremity Strength  Right LE  Left LE    Knee extension: 3+/5 Knee extension: 4-/5   Knee flexion: 4+/5 Knee flexion: 4-/5   Hip flexion: 4/5 Hip flexion: 4/5   Hip extension:  3/5 pain to low back Hip extension: 3/5 pain to low back   Hip abduction: 3+/5 Hip abduction: 3/5   Hip adduction: 3+/5 Hip adduction 3/5   Ankle dorsiflexion: 3+/5 Ankle dorsiflexion: 4/5   Ankle plantarflexion: 3/5 Ankle plantarflexion: 3/5       Special Tests:  -Repeated Flexion: decreased pain with supine knee to chest  -Repeated Ext: increased low back pain    Manual traction: pt reported decreased symptoms    SLS: 3 sec ea    Bridge test: unable to perform without low back pain    Neuro Dynamic Testing:    Sciatic nerve:      SLR: -      Joint Mobility: hypomobility throughout L spine with PA    Palpation: TTP to L4 and L5 with PA; no TTP to lumbar paraspinals or QL    Sensation: grossly intact to light touch; she reports tingling to B lower legs    Flexibility:    Popliteal Angle: R = -20 degrees ; L = -20 degrees     Intake Outcome Measure for FOTO lumbar spine Survey  Therapist reviewed FOTO scores for Nichelle Barone on 3/4/2024.  FOTO report- see Media section or FOTO account episode details.  Intake Score : 28%      PT Evaluation Completed? Yes  Discussed Plan of Care with patient and her , Lito: Yes    TREATMENT   Treatment Time In: 12:00  Treatment Time Out: 12:10  Total Treatment time separate from Evaluation: 10 minutes    Nichelle received therapeutic exercises to develop  ROM and flexibility for 10 minutes including:  DKTC 3x20 sec  LTR (wig wags) x10    May add: TA sets + PPT, TA progressions, bridges, SLR, SL clams, SL hip add, LAQ, HS curls, DF with TB, sit to stand, shuttle, tandem balance, SLS    Home Exercises and Patient Education Provided    Education provided:   - role of PT  -perform exercises in pain free ROM  .kme    Written Home Exercises Provided: yes.  Exercises were reviewed and Nichelle was able to demonstrate them prior to the end of the session.  Nichelle demonstrated good  understanding of the education provided.     See EMR under Patient Instructions for exercises provided 3/4/2024.    Assessment   Nichelle is a 68 y.o. female referred to outpatient Physical Therapy with a medical diagnosis of   M54.50 (ICD-10-CM) - Low back pain, unspecified   M54.6 (ICD-10-CM) - Pain in thoracic spine   . Pt presents with low back pain and radicular symptoms into LEs.  She reported decreased symptoms with manual traction so she should benefit from PT for lumbar traction.  She presents with decreased LE strength.  She will benefit from lumbar flexion principles, lumbar traction, and LE/core/postural strengthening.     Pt prognosis is Fair due to dementia and chronic neck and low back pain, hx of ACDF.   Pt will benefit from skilled outpatient Physical Therapy to address the deficits stated above and in the chart below, provide pt/family education, and to maximize pt's level of independence.     Plan of care discussed with patient and her : Yes  Pt's spiritual, cultural and educational needs considered and patient is agreeable to the plan of care and goals as stated below:     Anticipated Barriers for therapy: dementia, dependent on transportation    Medical Necessity is demonstrated by the following  History  Co-morbidities and personal factors that may impact the plan of care [] LOW: no personal factors / co-morbidities  [x] MODERATE: 1-2 personal factors / co-morbidities  []  HIGH: 3+ personal factors / co-morbidities    Moderate / High Support Documentation:   Co-morbidities affecting plan of care: dementia, prior ACDF, HTN    Personal Factors:   smoker     Examination  Body Structures and Functions, activity limitations and participation restrictions that may impact the plan of care [] LOW: addressing 1-2 elements  [] MODERATE: 3+ elements  [x] HIGH: 4+ elements (please support below)    Moderate / High Support Documentation: pain, decreased ROM, decreased strength, difficulty lifting     Clinical Presentation [] LOW: stable  [x] MODERATE: Evolving  [] HIGH: Unstable     Decision Making/ Complexity Score: moderate       GOALS: Short Term Goals:  4 weeks (progressing, not met)  1.Report decreased    low back    pain  <   / =  5  /10 at its worst  to increase tolerance for ADLs  2. Pt to increase B popliteal angle by > or = 10 degrees in order to improve flexibility and posture.   3. Increased R LE strength by 1/3 muscle grade in all deficient planes to increase tolerance for ADLs.  4. Increased L LE strength by 1/3 muscle grade in all deficient planes to increase tolerance for ADLs.  5. Pt will report centralization of symptoms to low back only for increased tolerance for ADLs.   6. Pt to tolerate HEP to improve ROM and independence with ADL's  7. Pt will demonstrate understanding of proper body mechanics for lifting and daily chores.    Long Term Goals: 8 weeks (progressing, not met)  1.Report decreased    low back    pain  <   / =  3  /10  to increase tolerance for ADLs  2.Pt to increase B popliteal angle by > or = 20 degrees in order to improve flexibility and posture.   3.Increased R LE strength by 1 muscle grade in all deficient planes to increase tolerance for ADLs.  4. Increased L LE strength by 1 muscle grade in all deficient planes to increase tolerance for ADLes.  5.Pt will perform SLS at least 10 sec ea for increased safety ambulating on uneven surfaces.  6. Pt to be  Independent with HEP to improve ROM and independence with ADL's  7. Pt to demonstrate negative Bridge Test in order to show improved core strength for lumbar stabilization.       Plan   Plan of care Certification: 3/4/2024 to 5/3/24.    Outpatient Physical Therapy 2 times weekly for 8 weeks to include the following interventions: Electrical Stimulation  , Gait Training, Manual Therapy, Moist Heat/ Ice, Neuromuscular Re-ed, Patient Education, Self Care, Therapeutic Activities, Therapeutic Exercise, and Ultrasound, and dry needling.     Marlyn Ozuna, PT

## 2024-03-06 ENCOUNTER — CLINICAL SUPPORT (OUTPATIENT)
Dept: REHABILITATION | Facility: HOSPITAL | Age: 69
End: 2024-03-06
Payer: MEDICARE

## 2024-03-06 DIAGNOSIS — M25.60 DECREASED RANGE OF MOTION: Primary | ICD-10-CM

## 2024-03-06 DIAGNOSIS — M62.81 MUSCLE WEAKNESS: ICD-10-CM

## 2024-03-06 DIAGNOSIS — R26.89 DECREASED FUNCTIONAL MOBILITY: ICD-10-CM

## 2024-03-06 PROCEDURE — 97112 NEUROMUSCULAR REEDUCATION: CPT | Mod: PN,CQ

## 2024-03-06 PROCEDURE — 97110 THERAPEUTIC EXERCISES: CPT | Mod: PN,CQ

## 2024-03-06 PROCEDURE — 97530 THERAPEUTIC ACTIVITIES: CPT | Mod: PN,CQ

## 2024-03-06 NOTE — PROGRESS NOTES
"[facility]   Physical Therapy Treatment Note      [id number]    Therapy Diagnosis:   Encounter Diagnoses   Name Primary?    Decreased range of motion Yes    Muscle weakness     Decreased functional mobility      Physician: MONAE Reed MD    Visit Date: 3/6/2024    Physician Orders: PT Eval and Treat   Medical Diagnosis from Referral:   M54.50 (ICD-10-CM) - Low back pain, unspecified   M54.6 (ICD-10-CM) - Pain in thoracic spine      Evaluation Date: 12/31/2024  Authorization Period Expiration: 2/25/25  Plan of Care Expiration: 5/3/24  Visit # / Visits authorized: 1/ 20 +eval     Time In: 1352   Time Out: 1441   Total Time: 49 minutes  Total Billable Time: 48 minutes  Precautions: Standard, dementia    PTA Visit #: 1/5       Subjective     Patient reports: "Not too bad." Legs get jumpy sometimes without known cause.     She was compliant with home exercise program twice yesterday.   Response to previous treatment: soreness which is resolved  Functional change: none stated    Pain: 8/10  Location: right >left low back     Objective      Objective Measures updated at progress report unless specified.     Treatment     Nichelle received the treatments listed below:      Nichelle received therapeutic exercises to develop ROM and flexibility for 13 minutes 1:1 supervision including:  DKTC 3x20 sec  LTR (wig wags) x10  HS curls, seated, red band x 10    ,May add: TA progressions, side lying hip add,SLR, , LAQ,  DF with TB,  shuttle     neuromuscular re-education activities to improve muscle activation and control for 27 minutes. The following activities were included:  TA sets + Posterior pelvic tilt x 10  Bridges x 10  Side lying clams x 10  SL hip abd x 10  SLS x 30s each  Tandem balance 2 x 30s each    therapeutic activities to improve functional performance for 08 minutes, including:  Sit to stand from 18" box +airex 1 x 10 (cues for weight shift and glute " activation)      Patient Education and Home Exercises       Education provided:   - -perform exercises in pain free ROM     Written Home Exercises Provided: yes.     Exercises were reviewed and Nichelle was able to demonstrate them prior to the end of the session.  Nichelle demonstrated fair  understanding of the education provided. See Electronic Medical Record under Patient Instructions for exercises provided during therapy sessions    Assessment     Elevated pain level reported in right>left low back. Reported god home exercise program compliance. Slow and antalgic gait and transfers, requiring extra time. Progressed strengthening with manual cues for increased core stabilization and prevention of compensations. Close supervision with bed mobility for safety 2* limited awareness. Improved pain reported after session.  Will benefit from continued physical therapy intervention to progress toward goals set forth in plan of care to improve functional mobility and quality of life.     Nichelle is progressing well towards her goals.   Patient prognosis is Fair.     Patient will continue to benefit from skilled outpatient physical therapy to address the deficits listed in the problem list box on initial evaluation, provide pt/family education and to maximize pt's level of independence in the home and community environment.     Patient's spiritual, cultural and educational needs considered and pt agreeable to plan of care and goals.     Anticipated barriers to physical therapy: dementia, dependent on transportation      Goals: Short Term Goals:  4 weeks (progressing, not met)  1.Report decreased    low back    pain  <   / =  5  /10 at its worst  to increase tolerance for ADLs  2. Pt to increase B popliteal angle by > or = 10 degrees in order to improve flexibility and posture.   3. Increased R LE strength by 1/3 muscle grade in all deficient planes to increase tolerance for ADLs.  4. Increased L LE strength by 1/3 muscle grade in  all deficient planes to increase tolerance for ADLs.  5. Pt will report centralization of symptoms to low back only for increased tolerance for ADLs.   6. Pt to tolerate HEP to improve ROM and independence with ADL's  7. Pt will demonstrate understanding of proper body mechanics for lifting and daily chores.     Long Term Goals: 8 weeks (progressing, not met)  1.Report decreased    low back    pain  <   / =  3  /10  to increase tolerance for ADLs  2.Pt to increase B popliteal angle by > or = 20 degrees in order to improve flexibility and posture.   3.Increased R LE strength by 1 muscle grade in all deficient planes to increase tolerance for ADLs.  4. Increased L LE strength by 1 muscle grade in all deficient planes to increase tolerance for ADLes.  5.Pt will perform SLS at least 10 sec ea for increased safety ambulating on uneven surfaces.  6. Pt to be Independent with HEP to improve ROM and independence with ADL's  7. Pt to demonstrate negative Bridge Test in order to show improved core strength for lumbar stabilization.     Plan     Continue per POC, progressing as appropriate to achieve stated goals.    Continue with: Plan of care Certification: 3/4/2024 to 5/3/24.     Outpatient Physical Therapy 2 times weekly for 8 weeks to include the following interventions: Electrical Stimulation  , Gait Training, Manual Therapy, Moist Heat/ Ice, Neuromuscular Re-ed, Patient Education, Self Care, Therapeutic Activities, Therapeutic Exercise, and Ultrasound, and dry needling.     Vivi Louise, PTA

## 2024-03-12 ENCOUNTER — CLINICAL SUPPORT (OUTPATIENT)
Dept: REHABILITATION | Facility: HOSPITAL | Age: 69
End: 2024-03-12
Payer: MEDICARE

## 2024-03-12 DIAGNOSIS — R26.89 DECREASED FUNCTIONAL MOBILITY: ICD-10-CM

## 2024-03-12 DIAGNOSIS — M62.81 MUSCLE WEAKNESS: ICD-10-CM

## 2024-03-12 DIAGNOSIS — M25.60 DECREASED RANGE OF MOTION: Primary | ICD-10-CM

## 2024-03-12 PROCEDURE — 97530 THERAPEUTIC ACTIVITIES: CPT | Mod: PN,CQ

## 2024-03-12 PROCEDURE — 97110 THERAPEUTIC EXERCISES: CPT | Mod: PN,CQ

## 2024-03-12 PROCEDURE — 97112 NEUROMUSCULAR REEDUCATION: CPT | Mod: PN,CQ

## 2024-03-12 NOTE — PROGRESS NOTES
[facility]   Physical Therapy Treatment Note      [id number]    Therapy Diagnosis:   Encounter Diagnoses   Name Primary?    Decreased range of motion Yes    Muscle weakness     Decreased functional mobility        Physician: MONAE Reed MD    Visit Date: 3/12/2024    Physician Orders: PT Eval and Treat   Medical Diagnosis from Referral:   M54.50 (ICD-10-CM) - Low back pain, unspecified   M54.6 (ICD-10-CM) - Pain in thoracic spine      Evaluation Date: 12/31/2024  Authorization Period Expiration: 2/25/25  Plan of Care Expiration: 5/3/24  Visit # / Visits authorized: 2/ 20 +eval     Time In: 1350   Time Out: 1455   Total Time: 65 minutes  Total Billable Time: 65 minutes    Precautions: Standard, dementia    PTA Visit #: 2/5       Subjective     Patient reports: pt and  report pt's pain relief after therapy sessions is short-lived and pain returns soon after session. Saw pain management yesterday but states MD referred her to a neurosurgeon 2* bulging discs. Pain at present 8/10 at C7, midback, and right SI area. Tried home exercise program but was really sore afterward. Trips easily over cords and rugs at home.     She was unable compliant with home exercise program twice yesterday.   Response to previous treatment: soreness which is resolved  Functional change: none stated    Pain: 8/10  Location: right >left low back     Objective      Objective Measures updated at progress report unless specified.     Treatment     Nichelle received the treatments listed below:      Nichelle received therapeutic exercises to develop ROM and flexibility for 16 minutes 1:1 supervision including:  NP-DKTC 3x20 sec  LTR (wig wags) x10  HS curls, seated, red band x 10  DF with red band x 10  LAQ x 10    ,May add: TA progressions, side lying hip add,SLR, shuttle     neuromuscular re-education activities to improve muscle activation and control for 34 minutes. The following  "activities were included:  TA sets + Posterior pelvic tilt x 20  TA sets +march x 10  Bridges x 10-manual cues for glute activation  Side lying clams x 10  SL hip abd x 10-manual cues to maintain core bracing  Diaphragmatic breathing x 2 minutes supine  SLS x 30s each  Tandem balance 2 x 30s each    therapeutic activities to improve functional performance for 15 minutes, including:  NP-Sit to stand from 18" box +airex 1 x 10 (cues for weight shift and glute activation)  Instructed to rugs and cords at home for safety.   No bending over or stooping  Reviewed proper posture and core bracing with activities around the home, and not sitting on her feet on the floor.   Logrolling for back protection      Patient Education and Home Exercises       Education provided:   - -perform exercises in pain free ROM     Written Home Exercises Provided: yes. Issued red band for home exercise program.     Exercises were reviewed and Nichelle was able to demonstrate them prior to the end of the session.  Nichelle demonstrated fair  understanding of the education provided. See Electronic Medical Record under Patient Instructions for exercises provided during therapy sessions    Assessment     Improved pain after last session but short-lived. Continued pain and limited home exercise program compliance 2* soreness likely 2* improper form. Cues for transverse abdominis vs rectus with core bracing.  Close supervision with bed mobility for safety 2* limited awareness. Improved pain reported after session. Manual cues for proper form with bridges and side lying exercises to prevent lumbar compensation.  Slow moving through exercise progressions,especially in sitting, and extra time spent with instruction of core bracing for back protection to prevent compensations and increased pain. Improved pain with exercises but pain increased to 7.5/10 in the waiting area when leaving clinic. Instruction to remove rugs, cords and other trip hazards from the " walkways.  Will benefit from continued physical therapy intervention to progress toward goals set forth in plan of care to improve functional mobility and quality of life.     Nichelle is progressing well towards her goals.   Patient prognosis is Fair.     Patient will continue to benefit from skilled outpatient physical therapy to address the deficits listed in the problem list box on initial evaluation, provide pt/family education and to maximize pt's level of independence in the home and community environment.     Patient's spiritual, cultural and educational needs considered and pt agreeable to plan of care and goals.     Anticipated barriers to physical therapy: dementia, dependent on transportation      Goals: Short Term Goals:  4 weeks (progressing, not met)  1.Report decreased    low back    pain  <   / =  5  /10 at its worst  to increase tolerance for ADLs  2. Pt to increase B popliteal angle by > or = 10 degrees in order to improve flexibility and posture.   3. Increased R LE strength by 1/3 muscle grade in all deficient planes to increase tolerance for ADLs.  4. Increased L LE strength by 1/3 muscle grade in all deficient planes to increase tolerance for ADLs.  5. Pt will report centralization of symptoms to low back only for increased tolerance for ADLs.   6. Pt to tolerate HEP to improve ROM and independence with ADL's  7. Pt will demonstrate understanding of proper body mechanics for lifting and daily chores.     Long Term Goals: 8 weeks (progressing, not met)  1.Report decreased    low back    pain  <   / =  3  /10  to increase tolerance for ADLs  2.Pt to increase B popliteal angle by > or = 20 degrees in order to improve flexibility and posture.   3.Increased R LE strength by 1 muscle grade in all deficient planes to increase tolerance for ADLs.  4. Increased L LE strength by 1 muscle grade in all deficient planes to increase tolerance for ADLes.  5.Pt will perform SLS at least 10 sec ea for increased  safety ambulating on uneven surfaces.  6. Pt to be Independent with HEP to improve ROM and independence with ADL's  7. Pt to demonstrate negative Bridge Test in order to show improved core strength for lumbar stabilization.     Plan     Continue per POC, progressing as appropriate to achieve stated goals.    Continue with: Plan of care Certification: 3/4/2024 to 5/3/24.     Outpatient Physical Therapy 2 times weekly for 8 weeks to include the following interventions: Electrical Stimulation  , Gait Training, Manual Therapy, Moist Heat/ Ice, Neuromuscular Re-ed, Patient Education, Self Care, Therapeutic Activities, Therapeutic Exercise, and Ultrasound, and dry needling.     Vivi Louise, PTA

## 2024-03-14 ENCOUNTER — CLINICAL SUPPORT (OUTPATIENT)
Dept: REHABILITATION | Facility: HOSPITAL | Age: 69
End: 2024-03-14
Payer: MEDICARE

## 2024-03-14 DIAGNOSIS — M25.60 DECREASED RANGE OF MOTION: Primary | ICD-10-CM

## 2024-03-14 DIAGNOSIS — M62.81 MUSCLE WEAKNESS: ICD-10-CM

## 2024-03-14 DIAGNOSIS — R26.89 DECREASED FUNCTIONAL MOBILITY: ICD-10-CM

## 2024-03-14 PROCEDURE — 97530 THERAPEUTIC ACTIVITIES: CPT | Mod: PN

## 2024-03-14 PROCEDURE — 97112 NEUROMUSCULAR REEDUCATION: CPT | Mod: PN

## 2024-03-14 NOTE — PROGRESS NOTES
[facility]   Physical Therapy Treatment Note      [id number]    Therapy Diagnosis:   Encounter Diagnoses   Name Primary?    Decreased range of motion Yes    Muscle weakness     Decreased functional mobility          Physician: MONAE Reed MD    Visit Date: 3/14/2024    Physician Orders: PT Eval and Treat   Medical Diagnosis from Referral:   M54.50 (ICD-10-CM) - Low back pain, unspecified   M54.6 (ICD-10-CM) - Pain in thoracic spine      Evaluation Date: 12/31/2024  Authorization Period Expiration: 2/25/25  Plan of Care Expiration: 5/3/24  Visit # / Visits authorized: 3/ 20 +eval     Time In: 2:30   Time Out: 3:18   Total Time: 48 minutes  Total Billable Time: 48 minutes    Precautions: Standard, dementia    PTA Visit #: 2/5       Subjective     Patient reports: she has severe pain to low back and neck.    She was unable compliant with home exercise program twice yesterday.   Response to previous treatment: soreness which is resolved  Functional change: none stated    Pain: 8.5/10  Location: right >left low back     Objective      Objective Measures updated at progress report unless specified.     Treatment     Nichelle received the treatments listed below:      Nichelle received therapeutic exercises to develop ROM and flexibility for 8 minutes 1:1 supervision including:  DKTC 3x20 sec  LTR (wig wags) x10  NP HS curls, seated, red band x 10  NP DF with red band x 10  NP LAQ x 10    May add: TA progressions, side lying hip add,SLR, shuttle     neuromuscular re-education activities to improve muscle activation and control for 25 minutes. The following activities were included:  TA sets + Posterior pelvic tilt x 20  TA sets +march x 10  Bridges 2x10-manual cues for glute activation  Side lying clams x15  SL hip abd x 15-manual cues to maintain core bracing  NP Diaphragmatic breathing x 2 minutes supine  SLS 2x 30s each  Tandem balance 2 x 30s each  Seated TA  "set + hip add Pilates x10, 5 sec hold    therapeutic activities to improve functional performance for 15 minutes, including:  Sit to stand from 18" box 1 x 10 (cues for weight shift and glute activation)  Shuttle + TA set 1.5 bands 2x10  Logrolling for back protection    Not performed today:  Instructed to rugs and cords at home for safety.   No bending over or stooping  Reviewed proper posture and core bracing with activities around the home, and not sitting on her feet on the floor.       Patient Education and Home Exercises       Education provided:   - -perform exercises in pain free ROM     Written Home Exercises Provided: yes. Issued red band for home exercise program.     Exercises were reviewed and Nichelle was able to demonstrate them prior to the end of the session.  Nichelle demonstrated fair  understanding of the education provided. See Electronic Medical Record under Patient Instructions for exercises provided during therapy sessions    Assessment     Pt was able to tolerate all exercises without exacerbation of pain.  She reported decreased pain to 7.5/10 post treatment.  Pt required cues for proper breathing while performing TA activation.  Will benefit from continued physical therapy intervention to progress toward goals set forth in plan of care to improve functional mobility and quality of life.     Nichelle is progressing well towards her goals.   Patient prognosis is Fair.     Patient will continue to benefit from skilled outpatient physical therapy to address the deficits listed in the problem list box on initial evaluation, provide pt/family education and to maximize pt's level of independence in the home and community environment.     Patient's spiritual, cultural and educational needs considered and pt agreeable to plan of care and goals.     Anticipated barriers to physical therapy: dementia, dependent on transportation      Goals: Short Term Goals:  4 weeks (progressing, not met)  1.Report decreased "    low back    pain  <   / =  5  /10 at its worst  to increase tolerance for ADLs  2. Pt to increase B popliteal angle by > or = 10 degrees in order to improve flexibility and posture.   3. Increased R LE strength by 1/3 muscle grade in all deficient planes to increase tolerance for ADLs.  4. Increased L LE strength by 1/3 muscle grade in all deficient planes to increase tolerance for ADLs.  5. Pt will report centralization of symptoms to low back only for increased tolerance for ADLs.   6. Pt to tolerate HEP to improve ROM and independence with ADL's  7. Pt will demonstrate understanding of proper body mechanics for lifting and daily chores.     Long Term Goals: 8 weeks (progressing, not met)  1.Report decreased    low back    pain  <   / =  3  /10  to increase tolerance for ADLs  2.Pt to increase B popliteal angle by > or = 20 degrees in order to improve flexibility and posture.   3.Increased R LE strength by 1 muscle grade in all deficient planes to increase tolerance for ADLs.  4. Increased L LE strength by 1 muscle grade in all deficient planes to increase tolerance for ADLes.  5.Pt will perform SLS at least 10 sec ea for increased safety ambulating on uneven surfaces.  6. Pt to be Independent with HEP to improve ROM and independence with ADL's  7. Pt to demonstrate negative Bridge Test in order to show improved core strength for lumbar stabilization.     Plan     Continue per POC, progressing as appropriate to achieve stated goals.    Continue with: Plan of care Certification: 3/4/2024 to 5/3/24.     Outpatient Physical Therapy 2 times weekly for 8 weeks to include the following interventions: Electrical Stimulation  , Gait Training, Manual Therapy, Moist Heat/ Ice, Neuromuscular Re-ed, Patient Education, Self Care, Therapeutic Activities, Therapeutic Exercise, and Ultrasound, and dry needling.     Marlyn Ozuna, PT

## 2024-03-18 ENCOUNTER — CLINICAL SUPPORT (OUTPATIENT)
Dept: REHABILITATION | Facility: HOSPITAL | Age: 69
End: 2024-03-18
Payer: MEDICARE

## 2024-03-18 DIAGNOSIS — R26.89 DECREASED FUNCTIONAL MOBILITY: ICD-10-CM

## 2024-03-18 DIAGNOSIS — M62.81 MUSCLE WEAKNESS: ICD-10-CM

## 2024-03-18 DIAGNOSIS — M25.60 DECREASED RANGE OF MOTION: Primary | ICD-10-CM

## 2024-03-18 PROCEDURE — 97110 THERAPEUTIC EXERCISES: CPT | Mod: PN,CQ

## 2024-03-18 PROCEDURE — 97530 THERAPEUTIC ACTIVITIES: CPT | Mod: PN,CQ

## 2024-03-18 PROCEDURE — 97112 NEUROMUSCULAR REEDUCATION: CPT | Mod: PN,CQ

## 2024-03-18 NOTE — PROGRESS NOTES
[facility]   Physical Therapy Treatment Note      [id number]    Therapy Diagnosis:   Encounter Diagnoses   Name Primary?    Decreased range of motion Yes    Muscle weakness     Decreased functional mobility        Physician: MONAE Reed MD    Visit Date: 3/18/2024    Physician Orders: PT Eval and Treat   Medical Diagnosis from Referral:   M54.50 (ICD-10-CM) - Low back pain, unspecified   M54.6 (ICD-10-CM) - Pain in thoracic spine      Evaluation Date: 12/31/2024  Authorization Period Expiration: 2/25/25  Plan of Care Expiration: 5/3/24  Visit # / Visits authorized: 4/ 20 +eval     Time In: 1306   Time Out: 1348   Total Time: 42 minutes  Total Billable Time: 42 minutes    Precautions: Standard, dementia    PTA Visit #: 3/5       Subjective     Patient reports: 8/10 left low back an 7/5 left hip. Neck is 7/5/10. May go to [place] Friday. Stets she knows she needs surgery, but has not seen surgeon.     She was compliant with home exercise program twice yesterday.   Response to previous treatment: soreness which is resolved  Functional change: none stated     Pain: 8/10  Location: right >left low back     Objective      Objective Measures updated at progress report unless specified.     Treatment     Nichelle received the treatments listed below:      Nichelle received therapeutic exercises to develop ROM and flexibility for 8 minutes 1:1 supervision including:  DKTC 3x20 sec  LTR (wig wags) x10  NP HS curls, seated, red band x 10  NP DF with red band x 10  NP LAQ x 10    May add: TA progressions, side lying hip add     neuromuscular re-education activities to improve muscle activation and control for 19 minutes. The following activities were included:  TA sets + posterior pelvic tilt x 20  TA sets +march x 10  Bridges 2x10-manual cues for glute activation  Side lying clams x15  SLR x 10   SL hip abd x 15-manual cues to maintain core bracing  NP Diaphragmatic  "breathing x 2 minutes supine  SLS 2x 30s each  SLS with airex x 30s each  Tandem balance 2 x 30s each  Seated TA set + hip add Pilates x15, 5 sec hold-cues for proper holds    therapeutic activities to improve functional performance for 15 minutes, including:  Sit to stand from 18" box 1 x 15 (cues for weight shift and glute activation)  Shuttle + TA set 1.5 bands 2x10  Logrolling for back protection    Not performed today:  Instructed to rugs and cords at home for safety.   No bending over or stooping  Reviewed proper posture and core bracing with activities around the home, and not sitting on her feet on the floor.       Patient Education and Home Exercises       Education provided:   - -perform exercises in pain free ROM     Written Home Exercises Provided:  Instructed patient to continue current home exercise program.    Exercises were reviewed and Nichelle was able to demonstrate them prior to the end of the session.  Nichelle demonstrated fair  understanding of the education provided. See Electronic Medical Record under Patient Instructions for exercises provided during therapy sessions    Assessment     Continued high pain levels reported. Guarded gait. Manual cues for increased core bracing and glute activation with exercises. Improved breathing with TA exercises with cues. Improved SLS, but pt is wearing loosely secured high top tennis shoes. Cues for safety around shuttle and getting on and off for back protection. Pain improved during session to as low as 6/10 in centralized low back with LE fatigue after session. Will benefit from continued physical therapy intervention to progress toward goals set forth in plan of care to improve functional mobility and quality of life.     Nichelle is progressing well towards her goals.   Patient prognosis is Fair.     Patient will continue to benefit from skilled outpatient physical therapy to address the deficits listed in the problem list box on initial evaluation, provide " pt/family education and to maximize pt's level of independence in the home and community environment.     Patient's spiritual, cultural and educational needs considered and pt agreeable to plan of care and goals.     Anticipated barriers to physical therapy: dementia, dependent on transportation      Goals: Short Term Goals:  4 weeks (progressing, not met)  1.Report decreased    low back    pain  <   / =  5  /10 at its worst  to increase tolerance for ADLs  2. Pt to increase B popliteal angle by > or = 10 degrees in order to improve flexibility and posture.   3. Increased R LE strength by 1/3 muscle grade in all deficient planes to increase tolerance for ADLs.  4. Increased L LE strength by 1/3 muscle grade in all deficient planes to increase tolerance for ADLs.  5. Pt will report centralization of symptoms to low back only for increased tolerance for ADLs.   6. Pt to tolerate HEP to improve ROM and independence with ADL's  7. Pt will demonstrate understanding of proper body mechanics for lifting and daily chores.     Long Term Goals: 8 weeks (progressing, not met)  1.Report decreased    low back    pain  <   / =  3  /10  to increase tolerance for ADLs  2.Pt to increase B popliteal angle by > or = 20 degrees in order to improve flexibility and posture.   3.Increased R LE strength by 1 muscle grade in all deficient planes to increase tolerance for ADLs.  4. Increased L LE strength by 1 muscle grade in all deficient planes to increase tolerance for ADLes.  5.Pt will perform SLS at least 10 sec ea for increased safety ambulating on uneven surfaces.  6. Pt to be Independent with HEP to improve ROM and independence with ADL's  7. Pt to demonstrate negative Bridge Test in order to show improved core strength for lumbar stabilization.     Plan     Continue per POC, progressing as appropriate to achieve stated goals.    Continue with: Plan of care Certification: 3/4/2024 to 5/3/24.     Outpatient Physical Therapy 2 times  weekly for 8 weeks to include the following interventions: Electrical Stimulation  , Gait Training, Manual Therapy, Moist Heat/ Ice, Neuromuscular Re-ed, Patient Education, Self Care, Therapeutic Activities, Therapeutic Exercise, and Ultrasound, and dry needling.     Vivi Louise, PTA

## 2024-03-20 ENCOUNTER — CLINICAL SUPPORT (OUTPATIENT)
Dept: REHABILITATION | Facility: HOSPITAL | Age: 69
End: 2024-03-20
Payer: MEDICARE

## 2024-03-20 DIAGNOSIS — M25.60 DECREASED RANGE OF MOTION: Primary | ICD-10-CM

## 2024-03-20 DIAGNOSIS — R26.89 DECREASED FUNCTIONAL MOBILITY: ICD-10-CM

## 2024-03-20 DIAGNOSIS — M62.81 MUSCLE WEAKNESS: ICD-10-CM

## 2024-03-20 PROCEDURE — 97530 THERAPEUTIC ACTIVITIES: CPT | Mod: PN

## 2024-03-20 PROCEDURE — 97112 NEUROMUSCULAR REEDUCATION: CPT | Mod: PN

## 2024-03-20 PROCEDURE — 97110 THERAPEUTIC EXERCISES: CPT | Mod: PN

## 2024-03-20 NOTE — PROGRESS NOTES
[facility]   Physical Therapy Treatment Note      [id number]    Therapy Diagnosis:   Encounter Diagnoses   Name Primary?    Decreased range of motion Yes    Muscle weakness     Decreased functional mobility          Physician: MONAE Reed MD    Visit Date: 3/20/2024    Physician Orders: PT Eval and Treat   Medical Diagnosis from Referral:   M54.50 (ICD-10-CM) - Low back pain, unspecified   M54.6 (ICD-10-CM) - Pain in thoracic spine      Evaluation Date: 12/31/2024  Authorization Period Expiration: 2/25/25  Plan of Care Expiration: 5/3/24  Visit # / Visits authorized: 6/ 20 +eval     Time In: 1:50   Time Out: 2:30   Total Time: 40 minutes  Total Billable Time: 40 minutes    Precautions: Standard, dementia    PTA Visit #: 3/5       Subjective     Patient reports: she still has pain to low back    She was compliant with home exercise program twice yesterday.   Response to previous treatment: soreness which is resolved  Functional change: none stated     Pain: 8/10  Location: right >left low back     Objective      Objective Measures updated at progress report unless specified.     Treatment     Nichelle received the treatments listed below:      Nichelle received therapeutic exercises to develop ROM and flexibility for 8 minutes 1:1 supervision including:  DKTC 3x20 sec  LTR (wig wags) x10  NP HS curls, seated, red band x 10  NP DF with red band x 10  NP LAQ x 10    May add: TA progressions, side lying hip add     neuromuscular re-education activities to improve muscle activation and control for 22 minutes. The following activities were included:  TA sets + posterior pelvic tilt x 20  TA sets +march x 10  Bridges 2x10-manual cues for glute activation  Side lying clams x15  SLR x 10   SL hip abd x 15-manual cues to maintain core bracing  NP Diaphragmatic breathing x 2 minutes supine  SLS 2x 30s each  SLS with airex x 30s each  Tandem balance 2 x 30s each  NP  "Seated TA set + hip add Pilates x15, 5 sec hold-cues for proper holds    therapeutic activities to improve functional performance for 10 minutes, including:  Sit to stand from 18" box 1 x 15 (cues for weight shift and glute activation)  NP Shuttle + TA set 1.5 bands 2x10  Logrolling for back protection    Not performed today:  Instructed to rugs and cords at home for safety.   No bending over or stooping  Reviewed proper posture and core bracing with activities around the home, and not sitting on her feet on the floor.       Patient Education and Home Exercises       Education provided:   - -perform exercises in pain free ROM     Written Home Exercises Provided:  Instructed patient to continue current home exercise program.    Exercises were reviewed and Nichelle was able to demonstrate them prior to the end of the session.  Nichelle demonstrated fair  understanding of the education provided. See Electronic Medical Record under Patient Instructions for exercises provided during therapy sessions    Assessment     Pt continues to report severe low back pain.  She reported decreased low back pain with DKTC and wig wags.  She requires cues to maintain TA bracing for low back support.  Improved form for clams and SL hip abduction.  Will benefit from continued physical therapy intervention to progress toward goals set forth in plan of care to improve functional mobility and quality of life.     Nichelle is progressing well towards her goals.   Patient prognosis is Fair.     Patient will continue to benefit from skilled outpatient physical therapy to address the deficits listed in the problem list box on initial evaluation, provide pt/family education and to maximize pt's level of independence in the home and community environment.     Patient's spiritual, cultural and educational needs considered and pt agreeable to plan of care and goals.     Anticipated barriers to physical therapy: dementia, dependent on transportation  "     Goals: Short Term Goals:  4 weeks (progressing, not met)  1.Report decreased    low back    pain  <   / =  5  /10 at its worst  to increase tolerance for ADLs  2. Pt to increase B popliteal angle by > or = 10 degrees in order to improve flexibility and posture.   3. Increased R LE strength by 1/3 muscle grade in all deficient planes to increase tolerance for ADLs.  4. Increased L LE strength by 1/3 muscle grade in all deficient planes to increase tolerance for ADLs.  5. Pt will report centralization of symptoms to low back only for increased tolerance for ADLs.   6. Pt to tolerate HEP to improve ROM and independence with ADL's  7. Pt will demonstrate understanding of proper body mechanics for lifting and daily chores.     Long Term Goals: 8 weeks (progressing, not met)  1.Report decreased    low back    pain  <   / =  3  /10  to increase tolerance for ADLs  2.Pt to increase B popliteal angle by > or = 20 degrees in order to improve flexibility and posture.   3.Increased R LE strength by 1 muscle grade in all deficient planes to increase tolerance for ADLs.  4. Increased L LE strength by 1 muscle grade in all deficient planes to increase tolerance for ADLes.  5.Pt will perform SLS at least 10 sec ea for increased safety ambulating on uneven surfaces.  6. Pt to be Independent with HEP to improve ROM and independence with ADL's  7. Pt to demonstrate negative Bridge Test in order to show improved core strength for lumbar stabilization.     Plan     Continue per POC, progressing as appropriate to achieve stated goals.  Progress core and LE strengthening as tolerated for improved functional mobility.    Continue with: Plan of care Certification: 3/4/2024 to 5/3/24.     Outpatient Physical Therapy 2 times weekly for 8 weeks to include the following interventions: Electrical Stimulation  , Gait Training, Manual Therapy, Moist Heat/ Ice, Neuromuscular Re-ed, Patient Education, Self Care, Therapeutic Activities,  Therapeutic Exercise, and Ultrasound, and dry needling.     Marlyn Ozuna, PT

## 2024-03-25 ENCOUNTER — CLINICAL SUPPORT (OUTPATIENT)
Dept: REHABILITATION | Facility: HOSPITAL | Age: 69
End: 2024-03-25
Payer: MEDICARE

## 2024-03-25 DIAGNOSIS — R26.89 DECREASED FUNCTIONAL MOBILITY: ICD-10-CM

## 2024-03-25 DIAGNOSIS — M25.60 DECREASED RANGE OF MOTION: Primary | ICD-10-CM

## 2024-03-25 DIAGNOSIS — M62.81 MUSCLE WEAKNESS: ICD-10-CM

## 2024-03-25 PROCEDURE — 97112 NEUROMUSCULAR REEDUCATION: CPT | Mod: PN,CQ

## 2024-03-25 PROCEDURE — 97530 THERAPEUTIC ACTIVITIES: CPT | Mod: PN,CQ

## 2024-03-25 PROCEDURE — 97110 THERAPEUTIC EXERCISES: CPT | Mod: PN,CQ

## 2024-03-25 NOTE — PROGRESS NOTES
"[facility]   Physical Therapy Treatment Note      [id number]    Therapy Diagnosis:   Encounter Diagnoses   Name Primary?    Decreased range of motion Yes    Muscle weakness     Decreased functional mobility        Physician: MONAE Reed MD    Visit Date: 3/25/2024    Physician Orders: PT Eval and Treat   Medical Diagnosis from Referral:   M54.50 (ICD-10-CM) - Low back pain, unspecified   M54.6 (ICD-10-CM) - Pain in thoracic spine      Evaluation Date: 12/31/2024  Authorization Period Expiration: 2/25/25  Plan of Care Expiration: 5/3/24  Visit # / Visits authorized: 7/ 20 +evaluation  FOTO Due visit 5=score 37 (3/2/2024)  FOTO Due visit 10       Time In: 1303   Time Out: 1351   Total Time: 48 minutes  Total Billable Time: 48 minutes    Precautions: Standard, dementia    PTA Visit #: 1/5       Subjective     Patient reports: pain at present 7.5/10 lower thoracic spine and LB. Pain when she woke up was 9/10. [place] and "[place] didn't help." Pain increased after the long ride until Sunday.     She was compliant with home exercise program twice yesterday. Broken up throughout the day.   Response to previous treatment: soreness which is resolved  Functional change: none stated     Pain: 7.5/10  Location: right >left low back     Objective      Objective Measures updated at progress report unless specified.     Treatment     Nichelle received the treatments listed below:      Nichelle received therapeutic exercises to develop ROM and flexibility for 8 minutes including:  DKTC 3x20 sec  LTR (wig wags) x10  NP HS curls, seated, red band x 10  NP DF with red band x 10  LAQ x 10    May add: TA progressions, side lying hip add     neuromuscular re-education activities to improve muscle activation and control for 28 minutes. The following activities were included:  TA sets + posterior pelvic tilt x 20  TA sets +march x 10  Bridges 2x10-manual cues for glute " "activation  Side lying clams x 20  SLR x 8-decreased core activation with fatigue, so reduced.    SL hip abd x 15-manual cues to maintain core bracing  Side lying hip adduction x 10-bolster under top leg for comfort  NP Diaphragmatic breathing x 2 minutes supine  NP-SLS 2x 30s each  SLS with airex x 30s each-wearing loose-fitting hi-top tennis  Tandem balance 2 x 30s each  NP Seated TA set + hip add Pilates x15, 5 sec hold-cues for proper holds    therapeutic activities to improve functional performance for 10 minutes, including:  Sit to stand from 18" box 1 x 15 (cues for weight shift and glute activation)  NP Shuttle + TA set 1.5 bands 2x10  Logrolling for back protection    Not performed today:  Instructed to rugs and cords at home for safety.   No bending over or stooping  Reviewed proper posture and core bracing with activities around the home, and not sitting on her feet on the floor.       Patient Education and Home Exercises       Education provided:   - -perform exercises in pain free ROM   Instructed pt to wear shoes for therapy that allow ankle mobility for balance and strength training.     Written Home Exercises Provided:  Instructed patient to continue current home exercise program.    Exercises were reviewed and Nicehlle was able to demonstrate them prior to the end of the session.  Nichelle demonstrated fair  understanding of the education provided. See Electronic Medical Record under Patient Instructions for exercises provided during therapy sessions    Assessment     Pain after long ride to and from [place], but improved after home exercise program this morning. Slightly decreased guarding with gait today. Tactile cues for increased core activation with posterior pelvic tilt. Limited SLR to 8 reps 2* decreased core bracing with fatigue. Lightheaded after SLS, requiring short seated rest, then able to resume session without further issues. Reported pain level unchanged after session but gait improved. Cues " for obstacle negotiation outside around curb. Will benefit from continued physical therapy intervention to progress toward goals set forth in plan of care to improve functional mobility and quality of life.     Nichelle is progressing well towards her goals.   Patient prognosis is Fair.     Patient will continue to benefit from skilled outpatient physical therapy to address the deficits listed in the problem list box on initial evaluation, provide pt/family education and to maximize pt's level of independence in the home and community environment.     Patient's spiritual, cultural and educational needs considered and pt agreeable to plan of care and goals.     Anticipated barriers to physical therapy: dementia, dependent on transportation      Goals: Short Term Goals:  4 weeks (progressing, not met)  1.Report decreased    low back    pain  <   / =  5  /10 at its worst  to increase tolerance for ADLs  2. Pt to increase B popliteal angle by > or = 10 degrees in order to improve flexibility and posture.   3. Increased R LE strength by 1/3 muscle grade in all deficient planes to increase tolerance for ADLs.  4. Increased L LE strength by 1/3 muscle grade in all deficient planes to increase tolerance for ADLs.  5. Pt will report centralization of symptoms to low back only for increased tolerance for ADLs.   6. Pt to tolerate HEP to improve ROM and independence with ADL's  7. Pt will demonstrate understanding of proper body mechanics for lifting and daily chores.     Long Term Goals: 8 weeks (progressing, not met)  1.Report decreased    low back    pain  <   / =  3  /10  to increase tolerance for ADLs  2.Pt to increase B popliteal angle by > or = 20 degrees in order to improve flexibility and posture.   3.Increased R LE strength by 1 muscle grade in all deficient planes to increase tolerance for ADLs.  4. Increased L LE strength by 1 muscle grade in all deficient planes to increase tolerance for ADLes.  5.Pt will perform SLS  at least 10 sec ea for increased safety ambulating on uneven surfaces.  6. Pt to be Independent with HEP to improve ROM and independence with ADL's  7. Pt to demonstrate negative Bridge Test in order to show improved core strength for lumbar stabilization.     Plan     Continue per POC, progressing as appropriate to achieve stated goals.  Progress core and LE strengthening as tolerated for improved functional mobility.    Continue with: Plan of care Certification: 3/4/2024 to 5/3/24.     Outpatient Physical Therapy 2 times weekly for 8 weeks to include the following interventions: Electrical Stimulation  , Gait Training, Manual Therapy, Moist Heat/ Ice, Neuromuscular Re-ed, Patient Education, Self Care, Therapeutic Activities, Therapeutic Exercise, and Ultrasound, and dry needling.     Vivi Louise, PTA

## 2024-03-27 ENCOUNTER — CLINICAL SUPPORT (OUTPATIENT)
Dept: REHABILITATION | Facility: HOSPITAL | Age: 69
End: 2024-03-27
Payer: MEDICARE

## 2024-03-27 DIAGNOSIS — M62.81 MUSCLE WEAKNESS: ICD-10-CM

## 2024-03-27 DIAGNOSIS — M25.60 DECREASED RANGE OF MOTION: Primary | ICD-10-CM

## 2024-03-27 DIAGNOSIS — R26.89 DECREASED FUNCTIONAL MOBILITY: ICD-10-CM

## 2024-03-27 PROCEDURE — 97110 THERAPEUTIC EXERCISES: CPT | Mod: PN,CQ

## 2024-03-27 PROCEDURE — 97530 THERAPEUTIC ACTIVITIES: CPT | Mod: PN,CQ

## 2024-03-27 PROCEDURE — 97112 NEUROMUSCULAR REEDUCATION: CPT | Mod: PN,CQ

## 2024-03-27 NOTE — PROGRESS NOTES
"[facility]   Physical Therapy Treatment Note      Name: Nichelle Barone  [facility] Number: [id number]    Therapy Diagnosis:   No diagnosis found.      Physician: MONAE Reed MD    Visit Date: 3/27/2024    Physician Orders: PT Eval and Treat   Medical Diagnosis from Referral:   M54.50 (ICD-10-CM) - Low back pain, unspecified   M54.6 (ICD-10-CM) - Pain in thoracic spine      Evaluation Date: 12/31/2024  Authorization Period Expiration: 2/25/25  Plan of Care Expiration: 5/3/24  Visit # / Visits authorized: 7/ 20 +evaluation  FOTO Due visit 5=2/3 score 37 (3/2/2024); goal: 37 by visit 13  FOTO Due visit 10       Time In: 1303   Time Out: 1351   Total Time: 48 minutes  Total Billable Time: 48 minutes    Precautions: Standard, dementia    PTA Visit #: 1/5       Subjective     Patient reports: pain at present 7.5/10 lower thoracic spine and LB. Pain when she woke up was 9/10. [place] and "that didn't help." Pain increased after the long ride until Sunday.     She was compliant with home exercise program twice yesterday. Broken up throughout the day.   Response to previous treatment: soreness which is resolved  Functional change: none stated     Pain: 7.5/10  Location: right >left low back     Objective      Objective Measures updated at progress report unless specified.     Treatment     Nichelle received the treatments listed below:      Nichelle received therapeutic exercises to develop ROM and flexibility for 8 minutes including:  DKTC 3x20 sec  LTR (wig wags) x10  NP HS curls, seated, red band x 10  NP DF with red band x 10  LAQ x 10    May add: TA progressions, side lying hip add     neuromuscular re-education activities to improve muscle activation and control for 28 minutes. The following activities were included:  TA sets + posterior pelvic tilt x 20  TA sets +march x 10  Bridges 2x10-manual cues for glute activation  Side lying clams x 20  SLR x 8-decreased core activation with " "fatigue, so reduced.    SL hip abd x 15-manual cues to maintain core bracing  Side lying hip adduction x 10-bolster under top leg for comfort  NP Diaphragmatic breathing x 2 minutes supine  NP-SLS 2x 30s each  SLS with airex x 30s each-wearing loose-fitting hi-top tennis  Tandem balance 2 x 30s each  NP Seated TA set + hip add Pilates x15, 5 sec hold-cues for proper holds    therapeutic activities to improve functional performance for 10 minutes, including:  Sit to stand from 18" box 1 x 15 (cues for weight shift and glute activation)  NP Shuttle + TA set 1.5 bands 2x10  Logrolling for back protection    Not performed today:  Instructed to rugs and cords at home for safety.   No bending over or stooping  Reviewed proper posture and core bracing with activities around the home, and not sitting on her feet on the floor.       Patient Education and Home Exercises       Education provided:   - -perform exercises in pain free ROM   Instructed pt to wear shoes for therapy that allow ankle mobility for balance and strength training.     Written Home Exercises Provided:  Instructed patient to continue current home exercise program.    Exercises were reviewed and Nichelle was able to demonstrate them prior to the end of the session.  Nichelle demonstrated fair  understanding of the education provided. See Electronic Medical Record under Patient Instructions for exercises provided during therapy sessions    Assessment     Pain after long ride to and from [place], but improved after home exercise program this morning. Slightly decreased guarding with gait today. Tactile cues for increased core activation with posterior pelvic tilt. Limited SLR to 8 reps 2* decreased core bracing with fatigue. Lightheaded after SLS, requiring short seated rest, then able to resume session without further issues. Reported pain level unchanged after session but gait improved. Cues for obstacle negotiation outside around curb. Will benefit from continued " physical therapy intervention to progress toward goals set forth in plan of care to improve functional mobility and quality of life.     Nichelle is progressing well towards her goals.   Patient prognosis is Fair.     Patient will continue to benefit from skilled outpatient physical therapy to address the deficits listed in the problem list box on initial evaluation, provide pt/family education and to maximize pt's level of independence in the home and community environment.     Patient's spiritual, cultural and educational needs considered and pt agreeable to plan of care and goals.     Anticipated barriers to physical therapy: dementia, dependent on transportation      Goals: Short Term Goals:  4 weeks (progressing, not met)  1.Report decreased    low back    pain  <   / =  5  /10 at its worst  to increase tolerance for ADLs  2. Pt to increase B popliteal angle by > or = 10 degrees in order to improve flexibility and posture.   3. Increased R LE strength by 1/3 muscle grade in all deficient planes to increase tolerance for ADLs.  4. Increased L LE strength by 1/3 muscle grade in all deficient planes to increase tolerance for ADLs.  5. Pt will report centralization of symptoms to low back only for increased tolerance for ADLs.   6. Pt to tolerate HEP to improve ROM and independence with ADL's  7. Pt will demonstrate understanding of proper body mechanics for lifting and daily chores.     Long Term Goals: 8 weeks (progressing, not met)  1.Report decreased    low back    pain  <   / =  3  /10  to increase tolerance for ADLs  2.Pt to increase B popliteal angle by > or = 20 degrees in order to improve flexibility and posture.   3.Increased R LE strength by 1 muscle grade in all deficient planes to increase tolerance for ADLs.  4. Increased L LE strength by 1 muscle grade in all deficient planes to increase tolerance for ADLes.  5.Pt will perform SLS at least 10 sec ea for increased safety ambulating on uneven  surfaces.  6. Pt to be Independent with HEP to improve ROM and independence with ADL's  7. Pt to demonstrate negative Bridge Test in order to show improved core strength for lumbar stabilization.     Plan     Continue per POC, progressing as appropriate to achieve stated goals.  Progress core and LE strengthening as tolerated for improved functional mobility.    Continue with: Plan of care Certification: 3/4/2024 to 5/3/24.     Outpatient Physical Therapy 2 times weekly for 8 weeks to include the following interventions: Electrical Stimulation  , Gait Training, Manual Therapy, Moist Heat/ Ice, Neuromuscular Re-ed, Patient Education, Self Care, Therapeutic Activities, Therapeutic Exercise, and Ultrasound, and dry needling.     Marlyn Ozuna, PT

## 2024-03-27 NOTE — PROGRESS NOTES
[facility]   Physical Therapy Treatment Note      [id number]    Therapy Diagnosis:   Encounter Diagnoses   Name Primary?    Decreased range of motion Yes    Muscle weakness     Decreased functional mobility        Physician: MONAE Reed MD    Visit Date: 3/27/2024    Physician Orders: PT Eval and Treat   Medical Diagnosis from Referral:   M54.50 (ICD-10-CM) - Low back pain, unspecified   M54.6 (ICD-10-CM) - Pain in thoracic spine      Evaluation Date: 12/31/2024  Authorization Period Expiration: 2/25/25  Plan of Care Expiration: 5/3/24  Visit # / Visits authorized: 8/ 20 +evaluation  FOTO Due visit 5=score 37 (3/2/2024)  FOTO Due visit 10       Time In: 1306   Time Out: 1356   Total Time: 50 minutes  Total Billable Time: 50 minutes    Precautions: Standard, dementia    PTA Visit #: 3/5       Subjective     Patient reports: pain still 8/10  lower thoracic spine and LB. Pain always seems to feel worse after therapy.  is having medical issues and had a lot of stress. States she performs home exercise program twice daily. Sick to her stomach, stating she has chronic stomach issues. Bed mobility is getting a little easier. States she consistently forgets to keep hips stacked with side lying clams at home and has to correct herself.     She was compliant with home exercise program twice daily  Response to previous treatment: soreness which is resolved  Functional change: none stated     Pain: 8/10  Location: right >left low back     Objective      Objective Measures updated at progress report unless specified.     Treatment     Nichelle received the treatments listed below:      Nichelle received therapeutic exercises to develop ROM and flexibility for 8 minutes including:  DKTC 3x20 sec  LTR (wig wags) x10  NP HS curls, seated, red band x 10  NP DF with red band x 10  LAQ x 10    May add: TA progressions     neuromuscular re-education activities to  "improve muscle activation and control for 32 minutes. The following activities were included:  TA sets + posterior pelvic tilt x 20  TA sets +march x 20  Bridges 2x10-manual cues for glute activation  Side lying clams 1 x 10 with yellow band, 1 x 10 without band  SLR x 8-decreased core activation with fatigue, so reduced.    SL hip abd x 15-manual cues to maintain core bracing  Side lying hip adduction x 10-bolster under top leg for comfort  NP Diaphragmatic breathing x 2 minutes supine  NP-SLS 2x 30s each  SLS with airex x 30s each-wearing loose-fitting hi-top tennis  Tandem balance 2 x 30s each  NP Seated TA set + hip add Pilates x15, 5 sec hold-cues for proper holds    therapeutic activities to improve functional performance for 10 minutes, including:  Sit to stand from 18" box 1 x 15 (cues for weight shift and glute activation)  NP Shuttle + TA set 1.5 bands 2x10  Logrolling for back protection    Not performed today:  Instructed to rugs and cords at home for safety.   No bending over or stooping  Reviewed proper posture and core bracing with activities around the home, and not sitting on her feet on the floor.       Patient Education and Home Exercises       Education provided:   - -perform exercises in pain free ROM   Instructed pt to wear shoes for therapy that allow ankle mobility for balance and strength training.     Written Home Exercises Provided:  Instructed patient to continue current home exercise program.    Exercises were reviewed and Nichelle was able to demonstrate them prior to the end of the session.  Nichelle demonstrated fair  understanding of the education provided. See Electronic Medical Record under Patient Instructions for exercises provided during therapy sessions    Assessment     Continued elevated pain levels and reporting good home exercise program compliance. Frequent manual cues for increased core activation with bracing 2* tendency to suck in her stomach. Improving bed mobility reported. " Increased height with bridges. Continued to have fatigue with SLR and unable to perform more than 8 reps without compensations. Will benefit from continued physical therapy intervention to progress toward goals set forth in plan of care to improve functional mobility and quality of life.     Nichelle is progressing well towards her goals.   Patient prognosis is Fair.     Patient will continue to benefit from skilled outpatient physical therapy to address the deficits listed in the problem list box on initial evaluation, provide pt/family education and to maximize pt's level of independence in the home and community environment.     Patient's spiritual, cultural and educational needs considered and pt agreeable to plan of care and goals.     Anticipated barriers to physical therapy: dementia, dependent on transportation      Goals: Short Term Goals:  4 weeks (progressing, not met)  1.Report decreased    low back    pain  <   / =  5  /10 at its worst  to increase tolerance for ADLs  2. Pt to increase B popliteal angle by > or = 10 degrees in order to improve flexibility and posture.   3. Increased R LE strength by 1/3 muscle grade in all deficient planes to increase tolerance for ADLs.  4. Increased L LE strength by 1/3 muscle grade in all deficient planes to increase tolerance for ADLs.  5. Pt will report centralization of symptoms to low back only for increased tolerance for ADLs.   6. Pt to tolerate HEP to improve ROM and independence with ADL's  7. Pt will demonstrate understanding of proper body mechanics for lifting and daily chores.     Long Term Goals: 8 weeks (progressing, not met)  1.Report decreased    low back    pain  <   / =  3  /10  to increase tolerance for ADLs  2.Pt to increase B popliteal angle by > or = 20 degrees in order to improve flexibility and posture.   3.Increased R LE strength by 1 muscle grade in all deficient planes to increase tolerance for ADLs.  4. Increased L LE strength by 1 muscle  grade in all deficient planes to increase tolerance for ADLes.  5.Pt will perform SLS at least 10 sec ea for increased safety ambulating on uneven surfaces.  6. Pt to be Independent with HEP to improve ROM and independence with ADL's  7. Pt to demonstrate negative Bridge Test in order to show improved core strength for lumbar stabilization.     Plan     Continue per POC, progressing as appropriate to achieve stated goals.  Progress core and LE strengthening as tolerated for improved functional mobility.    Continue with: Plan of care Certification: 3/4/2024 to 5/3/24.     Outpatient Physical Therapy 2 times weekly for 8 weeks to include the following interventions: Electrical Stimulation  , Gait Training, Manual Therapy, Moist Heat/ Ice, Neuromuscular Re-ed, Patient Education, Self Care, Therapeutic Activities, Therapeutic Exercise, and Ultrasound, and dry needling.     Vivi Louise, PTA

## 2024-04-01 ENCOUNTER — TELEPHONE (OUTPATIENT)
Dept: NEUROSURGERY | Facility: CLINIC | Age: 69
End: 2024-04-01
Payer: MEDICARE

## 2024-04-01 ENCOUNTER — CLINICAL SUPPORT (OUTPATIENT)
Dept: REHABILITATION | Facility: HOSPITAL | Age: 69
End: 2024-04-01
Payer: MEDICARE

## 2024-04-01 DIAGNOSIS — M25.60 DECREASED RANGE OF MOTION: Primary | ICD-10-CM

## 2024-04-01 DIAGNOSIS — M62.81 MUSCLE WEAKNESS: ICD-10-CM

## 2024-04-01 DIAGNOSIS — R26.89 DECREASED FUNCTIONAL MOBILITY: ICD-10-CM

## 2024-04-01 PROCEDURE — 97530 THERAPEUTIC ACTIVITIES: CPT | Mod: PN,CQ

## 2024-04-01 PROCEDURE — 97110 THERAPEUTIC EXERCISES: CPT | Mod: PN,CQ

## 2024-04-01 PROCEDURE — 97112 NEUROMUSCULAR REEDUCATION: CPT | Mod: PN,CQ

## 2024-04-01 NOTE — PROGRESS NOTES
"  [facility]   Physical Therapy Treatment Note      Name: Nichelle [facility] Number: [id number]    Therapy Diagnosis:   Encounter Diagnoses   Name Primary?    Decreased range of motion Yes    Muscle weakness     Decreased functional mobility        Physician: MONAE Reed MD    Visit Date: 4/1/2024    Physician Orders: PT Eval and Treat   Medical Diagnosis from Referral:   M54.50 (ICD-10-CM) - Low back pain, unspecified   M54.6 (ICD-10-CM) - Pain in thoracic spine      Evaluation Date: 12/31/2024  Authorization Period Expiration: 2/25/25  Plan of Care Expiration: 5/3/24  Visit # / Visits authorized: 9/ 20 +evaluation  FOTO Due visit 5=score 37 (3/2/2024)  FOTO Due visit 10       Time In: 1309   Time Out: 1457   Total Time: 48 minutes  Total Billable Time: 48 minutes    Precautions: Standard, dementia    PTA Visit #: 4/5       Subjective     Patient reports: "Hard to have a good weekend without pain medicine." States she consistently forgets to keep hips stacked with side lying clams at home, especially when the boat is rocking, and has to correct herself.     She was compliant with home exercise program twice daily  Response to previous treatment: soreness which is resolved  Functional change: none stated     Pain: 8/10  Location: right >left low back     Objective      Objective Measures updated at progress report unless specified.     Treatment     Nichelle received the treatments listed below:      Nichelle received therapeutic exercises to develop ROM and flexibility for 8 minutes including:  DKTC 3x20 sec  LTR (wig wags) x10  NP HS curls, seated, red band x 10  NP DF with red band x 10  LAQ x 10    May add: TA progressions     neuromuscular re-education activities to improve muscle activation and control for 30 minutes. The following activities were included:  TA sets + posterior pelvic tilt x 20  TA sets +march, yellow band x 20  Bridges, yellow band 2x10-manual cues for glute " "activation  Side lying clams 1 x 10 with yellow band, 1 x 10 without band  SLR x 10    NP-SL hip abd x 15-manual cues to maintain core bracing  NP-Side lying hip adduction x 10-bolster under top leg for comfort  NP Diaphragmatic breathing x 2 minutes supine  NP-SLS 2x 30s each  SLS with airex x 30s each-wearing loose-fitting hi-top tennis  Tandem balance 2 x 30s each  NP Seated TA set + hip add Pilates x15, 5 sec hold-cues for proper holds    therapeutic activities to improve functional performance for 10 minutes, including:  Sit to stand from 18" box 1 x 15 (cues for weight shift and glute activation)  NP Shuttle + TA set 1.5 bands 2x10  Logrolling for back protection    Not performed today:  Instructed to rugs and cords at home for safety.   No bending over or stooping  Reviewed proper posture and core bracing with activities around the home, and not sitting on her feet on the floor.       Patient Education and Home Exercises       Education provided:   - -perform exercises in pain free ROM   Instructed pt to wear shoes for therapy that allow ankle mobility for balance and strength training.     Written Home Exercises Provided:  Instructed patient to continue current home exercise program.    Exercises were reviewed and Nichelle was able to demonstrate them prior to the end of the session.  Nichelle demonstrated fair  understanding of the education provided. See Electronic Medical Record under Patient Instructions for exercises provided during therapy sessions    Assessment     Continued high levels of pain reported, but improving gait and transfers with decreased antalgia and increased pace. Progressed strengthening with good response without pain provocation.Verbal cues for increased knee extension with SLR left>right.  Decreased manual cues required today for core bracing with strengthening vs prior session. Good home exercise program compliance. Will benefit from continued physical therapy intervention to progress " toward goals set forth in plan of care to improve functional mobility and quality of life.     Nichelle is progressing well towards her goals.   Patient prognosis is Fair.     Patient will continue to benefit from skilled outpatient physical therapy to address the deficits listed in the problem list box on initial evaluation, provide pt/family education and to maximize pt's level of independence in the home and community environment.     Patient's spiritual, cultural and educational needs considered and pt agreeable to plan of care and goals.     Anticipated barriers to physical therapy: dementia, dependent on transportation      Goals: Short Term Goals:  4 weeks (progressing, not met)  1.Report decreased    low back    pain  <   / =  5  /10 at its worst  to increase tolerance for ADLs  2. Pt to increase B popliteal angle by > or = 10 degrees in order to improve flexibility and posture.   3. Increased R LE strength by 1/3 muscle grade in all deficient planes to increase tolerance for ADLs.  4. Increased L LE strength by 1/3 muscle grade in all deficient planes to increase tolerance for ADLs.  5. Pt will report centralization of symptoms to low back only for increased tolerance for ADLs.   6. Pt to tolerate HEP to improve ROM and independence with ADL's  7. Pt will demonstrate understanding of proper body mechanics for lifting and daily chores.     Long Term Goals: 8 weeks (progressing, not met)  1.Report decreased    low back    pain  <   / =  3  /10  to increase tolerance for ADLs  2.Pt to increase B popliteal angle by > or = 20 degrees in order to improve flexibility and posture.   3.Increased R LE strength by 1 muscle grade in all deficient planes to increase tolerance for ADLs.  4. Increased L LE strength by 1 muscle grade in all deficient planes to increase tolerance for ADLes.  5.Pt will perform SLS at least 10 sec ea for increased safety ambulating on uneven surfaces.  6. Pt to be Independent with HEP to improve  ROM and independence with ADL's  7. Pt to demonstrate negative Bridge Test in order to show improved core strength for lumbar stabilization.     Plan     Continue per POC, progressing as appropriate to achieve stated goals.  Progress core and LE strengthening as tolerated for improved functional mobility.    Continue with: Plan of care Certification: 3/4/2024 to 5/3/24.     Outpatient Physical Therapy 2 times weekly for 8 weeks to include the following interventions: Electrical Stimulation  , Gait Training, Manual Therapy, Moist Heat/ Ice, Neuromuscular Re-ed, Patient Education, Self Care, Therapeutic Activities, Therapeutic Exercise, and Ultrasound, and dry needling.     Vivi Louise, PTA

## 2024-04-01 NOTE — TELEPHONE ENCOUNTER
Returned call to pt's spouse. He reports he received a reminder that pt is scheduled as a new pt and she is actually established with Dr. Heller. Changed appt type to established. He also requested a sooner appt with Dr. Heller. Informed that appt is on the wait list and he will be contacted if sooner appt becomes available. He voiced understanding.

## 2024-04-01 NOTE — TELEPHONE ENCOUNTER
----- Message from Stacey M Lefort sent at 4/1/2024  2:50 PM CDT -----  Pt needs a callback at  [phone]. Thank you.

## 2024-04-02 NOTE — PROGRESS NOTES
[facility]   Physical Therapy Treatment Note      Name: Nichelle [facility] Number: [id number]    Therapy Diagnosis:   Encounter Diagnoses   Name Primary?    Decreased range of motion Yes    Muscle weakness     Decreased functional mobility          Physician: MONAE Reed MD    Visit Date: 4/3/2024    Physician Orders: PT Eval and Treat   Medical Diagnosis from Referral:   M54.50 (ICD-10-CM) - Low back pain, unspecified   M54.6 (ICD-10-CM) - Pain in thoracic spine      Evaluation Date: 12/31/2024  Authorization Period Expiration: 2/25/25  Plan of Care Expiration: 5/3/24 (reassessed on 3/4/24)  Visit # / Visits authorized: 10/ 20 +evaluation  FOTO Due visit 5=score 37 (3/2/2024) goal: 38 by visit 13  FOTO Due visit 10       Time In: 1:02   Time Out: 1:45   Total Time: 43 minutes  Total Billable Time: 43 minutes    Precautions: Standard, dementia    PTA Visit #: 4/5       Subjective     Patient reports:she has pain to her low back.    She was compliant with home exercise program twice daily  Response to previous treatment: soreness which is resolved  Functional change: able to stand from chair without UE support     Pain: 8/10  Location: right >left low back     Objective      Objective Measures updated at progress report unless specified.     Treatment     Nichelle received the treatments listed below:      Nichelle received therapeutic exercises to develop ROM and flexibility for 8 minutes including:  DKTC 3x20 sec  wig wags with ball between knees x1 min  LTR 3x20 sec  NP HS curls, seated, red band x 10  NP DF with red band x 10  NP LAQ x 10    May add: TA progressions     neuromuscular re-education activities to improve muscle activation and control for 25 minutes. The following activities were included:  TA sets + posterior pelvic tilt x 10  TA sets +march, yellow loop x 20  Bridges, yellow loop 2x10-manual cues for glute activation  Side lying clams 2x10 YTB  SLR x 10  ea  NP-SL hip  "abd x 15-manual cues to maintain core bracing  NP-Side lying hip adduction x 10-bolster under top leg for comfort  NP Diaphragmatic breathing x 2 minutes supine  NP-SLS 2x 30s each  SLS with airex x 30s each-wearing loose-fitting hi-top tennis  Tandem balance on foam 2 x 30s each  NP Seated TA set + hip add Pilates x15, 5 sec hold-cues for proper holds    therapeutic activities to improve functional performance for 10 minutes, including:  Sit to stand from 18" box 2x10 (cues for weight shift and glute activation)  Shuttle + TA set 1.5 bands 2x10  Logrolling for back protection      Patient Education and Home Exercises       Education provided:   - -perform exercises in pain free ROM   Instructed pt to wear shoes for therapy that allow ankle mobility for balance and strength training.     Written Home Exercises Provided:  Instructed patient to continue current home exercise program.    Exercises were reviewed and Nichelle was able to demonstrate them prior to the end of the session.  Nichelle demonstrated fair  understanding of the education provided. See Electronic Medical Record under Patient Instructions for exercises provided during therapy sessions    Assessment     Pt tolerated treatment session well.  She was able to tolerate progression of core and LE strength without exacerbation of pain.  Balance was challenging on foam. Will benefit from continued physical therapy intervention to progress toward goals set forth in plan of care to improve functional mobility and quality of life.     Nichelle is progressing well towards her goals.   Patient prognosis is Fair.     Patient will continue to benefit from skilled outpatient physical therapy to address the deficits listed in the problem list box on initial evaluation, provide pt/family education and to maximize pt's level of independence in the home and community environment.     Patient's spiritual, cultural and educational needs considered and pt agreeable to plan of " care and goals.     Anticipated barriers to physical therapy: dementia, dependent on transportation      Goals: Short Term Goals:  4 weeks (progressing, not met)  1.Report decreased    low back    pain  <   / =  5  /10 at its worst  to increase tolerance for ADLs  2. Pt to increase B popliteal angle by > or = 10 degrees in order to improve flexibility and posture.   3. Increased R LE strength by 1/3 muscle grade in all deficient planes to increase tolerance for ADLs.  4. Increased L LE strength by 1/3 muscle grade in all deficient planes to increase tolerance for ADLs.  5. Pt will report centralization of symptoms to low back only for increased tolerance for ADLs.   6. Pt to tolerate HEP to improve ROM and independence with ADL's  7. Pt will demonstrate understanding of proper body mechanics for lifting and daily chores.     Long Term Goals: 8 weeks (progressing, not met)  1.Report decreased    low back    pain  <   / =  3  /10  to increase tolerance for ADLs  2.Pt to increase B popliteal angle by > or = 20 degrees in order to improve flexibility and posture.   3.Increased R LE strength by 1 muscle grade in all deficient planes to increase tolerance for ADLs.  4. Increased L LE strength by 1 muscle grade in all deficient planes to increase tolerance for ADLes.  5.Pt will perform SLS at least 10 sec ea for increased safety ambulating on uneven surfaces.  6. Pt to be Independent with HEP to improve ROM and independence with ADL's  7. Pt to demonstrate negative Bridge Test in order to show improved core strength for lumbar stabilization.     Plan     Continue per POC, progressing as appropriate to achieve stated goals.  Progress core and LE strengthening as tolerated for improved functional mobility.    Continue with: Plan of care Certification: 3/4/2024 to 5/3/24.     Outpatient Physical Therapy 2 times weekly for 8 weeks to include the following interventions: Electrical Stimulation  , Gait Training, Manual Therapy,  Moist Heat/ Ice, Neuromuscular Re-ed, Patient Education, Self Care, Therapeutic Activities, Therapeutic Exercise, and Ultrasound, and dry needling.     Marlyn Ozuna, PT

## 2024-04-03 ENCOUNTER — CLINICAL SUPPORT (OUTPATIENT)
Dept: REHABILITATION | Facility: HOSPITAL | Age: 69
End: 2024-04-03
Payer: MEDICARE

## 2024-04-03 DIAGNOSIS — R26.89 DECREASED FUNCTIONAL MOBILITY: ICD-10-CM

## 2024-04-03 DIAGNOSIS — M62.81 MUSCLE WEAKNESS: ICD-10-CM

## 2024-04-03 DIAGNOSIS — M25.60 DECREASED RANGE OF MOTION: Primary | ICD-10-CM

## 2024-04-03 PROCEDURE — 97112 NEUROMUSCULAR REEDUCATION: CPT | Mod: PN

## 2024-04-03 PROCEDURE — 97530 THERAPEUTIC ACTIVITIES: CPT | Mod: PN

## 2024-04-08 ENCOUNTER — CLINICAL SUPPORT (OUTPATIENT)
Dept: REHABILITATION | Facility: HOSPITAL | Age: 69
End: 2024-04-08
Payer: MEDICARE

## 2024-04-08 DIAGNOSIS — R26.89 DECREASED FUNCTIONAL MOBILITY: ICD-10-CM

## 2024-04-08 DIAGNOSIS — M25.60 DECREASED RANGE OF MOTION: Primary | ICD-10-CM

## 2024-04-08 DIAGNOSIS — M62.81 MUSCLE WEAKNESS: ICD-10-CM

## 2024-04-08 PROCEDURE — 97112 NEUROMUSCULAR REEDUCATION: CPT | Mod: PN

## 2024-04-08 PROCEDURE — 97110 THERAPEUTIC EXERCISES: CPT | Mod: PN

## 2024-04-08 NOTE — PROGRESS NOTES
[facility]   Physical Therapy Treatment Note      Name: Nichelle Barone  [facility] Number: [id number]    Therapy Diagnosis:   Encounter Diagnoses   Name Primary?    Decreased range of motion Yes    Muscle weakness     Decreased functional mobility            Physician: MONAE Reed MD    Visit Date: 4/8/2024    Physician Orders: PT Eval and Treat   Medical Diagnosis from Referral:   M54.50 (ICD-10-CM) - Low back pain, unspecified   M54.6 (ICD-10-CM) - Pain in thoracic spine      Evaluation Date: 12/31/2024  Authorization Period Expiration: 2/25/25  Plan of Care Expiration: 5/3/24 (reassessed on 3/4/24)  Visit # / Visits authorized: 11/ 20 +evaluation  FOTO Due visit 5=score 37 (3/2/2024) goal: 37 by visit 13  FOTO Due visit 10= 37 (4/8/24); goal met and FOTO d/c       Time In: 1:00   Time Out: 1:45   Total Time: 40 minutes  Total Billable Time: 40 minutes    Precautions: Standard, dementia    PTA Visit #: 4/5       Subjective     Patient reports:she has pain to her low back.  She states she has pressure to both ears and has been getting headaches.    She was compliant with home exercise program twice daily  Response to previous treatment: soreness which is resolved  Functional change: able to stand from chair without UE support     Pain: 7/10  Location: right >left low back     Objective      Lumbar Range of Motion:    % Pain   Flexion 90   Tight HS        Extension 50   none        Left Side Bending 100 no      Right Side Bending 75 Pain to low back        Left rotation   50 no        Right Rotation   75 no             Lower Extremity Strength  Right LE  Left LE    Knee extension: 4-/5 Knee extension: 4/5   Knee flexion: 4+/5 Knee flexion: 4-/5   Hip flexion: 4/5 Hip flexion: 4/5   Hip extension:  3+/5  Hip extension: 3+/5    Hip abduction: 4-/5 Hip abduction: 3+/5   Hip adduction: 4-/5 Hip adduction 3+/5   Ankle dorsiflexion: 4-/5 Ankle dorsiflexion: 4/5   Ankle plantarflexion: 3/5 Ankle  "plantarflexion: 3/5       Flexibility:    Popliteal Angle: R = -20 degrees ; L = -20 degrees    Treatment     Nichelle received the treatments listed below:      Nichelle received therapeutic exercises to develop ROM and flexibility for 15 minutes including:  Reassessment  DKTC 3x20 sec  wig wags with ball between knees x1 min  LTR 3x20 sec  Supine HS stretch 6x10 sec  NP HS curls, seated, red band x 10  NP DF with red band x 10  NP LAQ x 10    May add: TA progressions     neuromuscular re-education activities to improve muscle activation and control for 25 minutes. The following activities were included:  TA sets + posterior pelvic tilt x 10  TA sets +march, yellow loop x 20  Bridges, yellow loop 2x10-manual cues for glute activation  Side lying clams 2x10 Y sport loop  SLR x 10  ea  NP-SL hip abd x 15-manual cues to maintain core bracing  NP-Side lying hip adduction x 10-bolster under top leg for comfort  NP Diaphragmatic breathing x 2 minutes supine  NP-SLS 2x 30s each  SLS with airex x 30s each-wearing loose-fitting hi-top tennis  Tandem balance on foam 2 x 30s each  NP Seated TA set + hip add Pilates x15, 5 sec hold-cues for proper holds    therapeutic activities to improve functional performance for 5 minutes, including:  Sit to stand from 16" chair 2x10 (cues for weight shift and glute activation)  NP Shuttle + TA set 1.5 bands 2x10  Logrolling for back protection      Patient Education and Home Exercises       Education provided:   - -perform exercises in pain free ROM   Instructed pt to wear shoes for therapy that allow ankle mobility for balance and strength training.     Written Home Exercises Provided:  Instructed patient to continue current home exercise program.    Exercises were reviewed and Nichelle was able to demonstrate them prior to the end of the session.  Nichelle demonstrated fair  understanding of the education provided. See Electronic Medical Record under Patient Instructions for exercises provided " during therapy sessions    Assessment     Pt reassessed today.  She has improved with increased lumbar ROM and LE strength.  She continues to have most weakness to hip muscles (L weaker than R).  She was able to perform sit to stands from lower seat height.  She will continue to benefit from PT for improved core and LE strength and stabilization for increased ease with functional mobility.    Nichelle is progressing well towards her goals.   Patient prognosis is Fair.     Patient will continue to benefit from skilled outpatient physical therapy to address the deficits listed in the problem list box on initial evaluation, provide pt/family education and to maximize pt's level of independence in the home and community environment.     Patient's spiritual, cultural and educational needs considered and pt agreeable to plan of care and goals.     Anticipated barriers to physical therapy: dementia, dependent on transportation      Goals: Short Term Goals:  4 weeks (progressing, not met)  1.Report decreased    low back    pain  <   / =  5  /10 at its worst  to increase tolerance for ADLs (progressing, not met)  2. Pt to increase B popliteal angle by > or = 10 degrees in order to improve flexibility and posture.   3. Increased R LE strength by 1/3 muscle grade in all deficient planes to increase tolerance for ADLs. (Met)  4. Increased L LE strength by 1/3 muscle grade in all deficient planes to increase tolerance for ADLs. (Met)  5. Pt will report centralization of symptoms to low back only for increased tolerance for ADLs.  (progressing, not met)  6. Pt to tolerate HEP to improve ROM and independence with ADL's (met)  7. Pt will demonstrate understanding of proper body mechanics for lifting and daily chores.     Long Term Goals: 8 weeks (progressing, not met)  1.Report decreased    low back    pain  <   / =  3  /10  to increase tolerance for ADLs  2.Pt to increase B popliteal angle by > or = 20 degrees in order to improve  flexibility and posture.   3.Increased R LE strength by 1 muscle grade in all deficient planes to increase tolerance for ADLs.  4. Increased L LE strength by 1 muscle grade in all deficient planes to increase tolerance for ADLes.  5.Pt will perform SLS at least 10 sec ea for increased safety ambulating on uneven surfaces.  6. Pt to be Independent with HEP to improve ROM and independence with ADL's  7. Pt to demonstrate negative Bridge Test in order to show improved core strength for lumbar stabilization.     Plan     Continue per POC, progressing as appropriate to achieve stated goals.  Progress core and LE strengthening as tolerated for improved functional mobility.    Continue with: Plan of care Certification: 3/4/2024 to 5/3/24.     Outpatient Physical Therapy 2 times weekly for 8 weeks to include the following interventions: Electrical Stimulation  , Gait Training, Manual Therapy, Moist Heat/ Ice, Neuromuscular Re-ed, Patient Education, Self Care, Therapeutic Activities, Therapeutic Exercise, and Ultrasound, and dry needling.     Marlyn Ozuna, PT

## 2024-04-15 ENCOUNTER — CLINICAL SUPPORT (OUTPATIENT)
Dept: REHABILITATION | Facility: HOSPITAL | Age: 69
End: 2024-04-15
Payer: MEDICARE

## 2024-04-15 DIAGNOSIS — M62.81 MUSCLE WEAKNESS: ICD-10-CM

## 2024-04-15 DIAGNOSIS — R26.89 DECREASED FUNCTIONAL MOBILITY: ICD-10-CM

## 2024-04-15 DIAGNOSIS — M25.60 DECREASED RANGE OF MOTION: Primary | ICD-10-CM

## 2024-04-15 PROCEDURE — 97112 NEUROMUSCULAR REEDUCATION: CPT | Mod: PN,CQ

## 2024-04-15 PROCEDURE — 97110 THERAPEUTIC EXERCISES: CPT | Mod: PN,CQ

## 2024-04-15 NOTE — PROGRESS NOTES
[facility]   Physical Therapy Treatment Note      Name: Nichelle Barone  [facility] Number: [id number]    Therapy Diagnosis:   Encounter Diagnoses   Name Primary?    Decreased range of motion Yes    Muscle weakness     Decreased functional mobility        Physician: MONAE Reed MD    Visit Date: 4/15/2024    Physician Orders: PT Eval and Treat   Medical Diagnosis from Referral:   M54.50 (ICD-10-CM) - Low back pain, unspecified   M54.6 (ICD-10-CM) - Pain in thoracic spine      Evaluation Date: 12/31/2024  Authorization Period Expiration: 2/25/25  Plan of Care Expiration: 5/3/24 (reassessed on 3/4/24)  Visit # / Visits authorized: 11/ 20 +evaluation  FOTO Due visit 5=score 37 (3/2/2024) goal: 37 by visit 13  FOTO Due visit 10= 37 (4/8/24); goal met and FOTO d/c       Time In: 1451   Time Out: 1554   Total Time: 63 minutes  Total Billable Time: 54 minutes    Precautions: Standard, dementia    PTA Visit #: 1/5       Subjective     Patient reports: has had two bladder leakages lately. Having groin and medial thigh radiating pain, as well as bilateral low back and left>right neck pain. Occasional saddle area numbness.  states all this was discussed with her and plan to follow up in 6 months unless symptoms increase. States her bladder and bowel dysfunction has been going on for years.  states she is moving better at home, so they decided to not get more pain meds.     She was compliant with home exercise program twice daily  Response to previous treatment: soreness which is resolved  Functional change: able to stand from chair without UE support     Pain: 7/10  Location: right >left low back     Objective      Lumbar Range of Motion:    % Pain   Flexion 90   Tight HS        Extension 50   none        Left Side Bending 100 no      Right Side Bending 75 Pain to low back        Left rotation   50 no        Right Rotation   75 no             Lower Extremity Strength  Right LE  Left LE   "  Knee extension: 4-/5 Knee extension: 4/5   Knee flexion: 4+/5 Knee flexion: 4-/5   Hip flexion: 4/5 Hip flexion: 4/5   Hip extension:  3+/5  Hip extension: 3+/5    Hip abduction: 4-/5 Hip abduction: 3+/5   Hip adduction: 4-/5 Hip adduction 3+/5   Ankle dorsiflexion: 4-/5 Ankle dorsiflexion: 4/5   Ankle plantarflexion: 3/5 Ankle plantarflexion: 3/5       Flexibility:    Popliteal Angle: R = -20 degrees ; L = -20 degrees    Treatment     Nichelle received the treatments listed below:      Nichelle received therapeutic exercises to develop ROM and flexibility for 23 minutes including:    DKTC 3x20 sec  wig wags with ball between knees x1 min  LTR 3x20 sec  Supine HS stretch 6x10 sec  NP HS curls, seated, red band x 10  NP DF with red band x 10  NP LAQ x 10    May add: TA progressions     neuromuscular re-education activities to improve muscle activation and control for 28 minutes. The following activities were included:  TA sets + posterior pelvic tilt x 10  TA sets +march, yellow loop x 20  Bridges, yellow loop 2x10-manual cues for glute activation  Side lying clams 2x10 Y sport loop  SLR x 10  ea  SL hip abd x 15-manual cues to maintain core bracing  NP-Side lying hip adduction x 10-bolster under top leg for comfort  NP Diaphragmatic breathing x 2 minutes supine  SLS with airex 2 x 30s each  Tandem balance on foam 2 x 30s each  NP Seated TA set + hip add Pilates x15, 5 sec hold-cues for proper holds    therapeutic activities to improve functional performance for 3 minutes, including:  Sit to stand from 16" chair 2x10 (cues for weight shift and glute activation)  NP Shuttle + TA set 1.5 bands 2x10  Logrolling for back protection      Patient Education and Home Exercises       Education provided:   - -perform exercises in pain free ROM   Instructed pt to wear shoes for therapy that allow ankle mobility for balance and strength training.     Written Home Exercises Provided:  Instructed patient to continue current home " exercise program.    Exercises were reviewed and Nichelle was able to demonstrate them prior to the end of the session.  Nichelle demonstrated fair  understanding of the education provided. See Electronic Medical Record under Patient Instructions for exercises provided during therapy sessions    Assessment     Continued high levels of pain reported; however  states pt is having improved mobility at home without the need for prescription pain meds. Aforementioned bowel and bladder issues which  attributes to chronic issues that she has been receiving medical treatment to manage. States saddle pain and numbness also addressed with neurologist and was cleared to resume her activities. Performed exercises in clinic with good response without pain provocation. Bilateral LE trembling with fatigue today with exercises. Sit<>stand with more control today. Improving performance with SLS on foam with occasional UE tapping on // bars. She will continue to benefit from PT for improved core and LE strength and stabilization for increased ease with functional mobility.    Nichelle is progressing well towards her goals.   Patient prognosis is Fair.     Patient will continue to benefit from skilled outpatient physical therapy to address the deficits listed in the problem list box on initial evaluation, provide pt/family education and to maximize pt's level of independence in the home and community environment.     Patient's spiritual, cultural and educational needs considered and pt agreeable to plan of care and goals.     Anticipated barriers to physical therapy: dementia, dependent on transportation      Goals: Short Term Goals:  4 weeks (progressing, not met)  1.Report decreased    low back    pain  <   / =  5  /10 at its worst  to increase tolerance for ADLs (progressing, not met)  2. Pt to increase B popliteal angle by > or = 10 degrees in order to improve flexibility and posture.   3. Increased R LE strength by 1/3  muscle grade in all deficient planes to increase tolerance for ADLs. (Met)  4. Increased L LE strength by 1/3 muscle grade in all deficient planes to increase tolerance for ADLs. (Met)  5. Pt will report centralization of symptoms to low back only for increased tolerance for ADLs.  (progressing, not met)  6. Pt to tolerate HEP to improve ROM and independence with ADL's (met)  7. Pt will demonstrate understanding of proper body mechanics for lifting and daily chores.     Long Term Goals: 8 weeks (progressing, not met)  1.Report decreased    low back    pain  <   / =  3  /10  to increase tolerance for ADLs  2.Pt to increase B popliteal angle by > or = 20 degrees in order to improve flexibility and posture.   3.Increased R LE strength by 1 muscle grade in all deficient planes to increase tolerance for ADLs.  4. Increased L LE strength by 1 muscle grade in all deficient planes to increase tolerance for ADLes.  5.Pt will perform SLS at least 10 sec ea for increased safety ambulating on uneven surfaces.  6. Pt to be Independent with HEP to improve ROM and independence with ADL's  7. Pt to demonstrate negative Bridge Test in order to show improved core strength for lumbar stabilization.     Plan     Continue per POC, progressing as appropriate to achieve stated goals.  Progress core and LE strengthening as tolerated for improved functional mobility.    Continue with: Plan of care Certification: 3/4/2024 to 5/3/24.     Outpatient Physical Therapy 2 times weekly for 8 weeks to include the following interventions: Electrical Stimulation  , Gait Training, Manual Therapy, Moist Heat/ Ice, Neuromuscular Re-ed, Patient Education, Self Care, Therapeutic Activities, Therapeutic Exercise, and Ultrasound, and dry needling.     Vivi Louise, PTA

## 2024-04-16 NOTE — PROGRESS NOTES
"  [facility] OUTPATIENT THERAPY AND WELLNESS   Physical Therapy Treatment Note      Name: Nichelle Barone  [facility] Number: [id number]    Therapy Diagnosis:   Encounter Diagnoses   Name Primary?    Decreased range of motion Yes    Muscle weakness     Decreased functional mobility        Physician: MONAE Reed MD    Visit Date: 4/17/2024    Physician Orders: PT Eval and Treat   Medical Diagnosis from Referral:   M54.50 (ICD-10-CM) - Low back pain, unspecified   M54.6 (ICD-10-CM) - Pain in thoracic spine      Evaluation Date: 12/31/2024  Authorization Period Expiration: 2/25/25  Plan of Care Expiration: 5/3/24 (reassessed on 3/4/24)  Visit # / Visits authorized: 12/ 20 +evaluation  FOTO Due visit 5=score 37 (3/2/2024) goal: 37 by visit 13  FOTO Due visit 10= 37 (4/8/24); goal met and FOTO d/c       Time In: 1303   Time Out: 1345   Total Time: 42 minutes  Total Billable Time: 42 minutes    Precautions: Standard, dementia    PTA Visit #: 2/5       Subjective     Patient reports: pain is about an 8/10. Soreness bilateral low back "from exercises".     She was compliant with home exercise program twice daily  Response to previous treatment: soreness which is resolved  Functional change: able to stand from chair without UE support     Pain: 8/10  Location: right >left low back     Objective        Not performed this date:  Lumbar Range of Motion:    % Pain   Flexion 90   Tight HS        Extension 50   none        Left Side Bending 100 no      Right Side Bending 75 Pain to low back        Left rotation   50 no        Right Rotation   75 no             Lower Extremity Strength  Right LE  Left LE    Knee extension: 4-/5 Knee extension: 4/5   Knee flexion: 4+/5 Knee flexion: 4-/5   Hip flexion: 4/5 Hip flexion: 4/5   Hip extension:  3+/5  Hip extension: 3+/5    Hip abduction: 4-/5 Hip abduction: 3+/5   Hip adduction: 4-/5 Hip adduction 3+/5   Ankle dorsiflexion: 4-/5 Ankle dorsiflexion: 4/5   Ankle plantarflexion: 3/5 Ankle " "plantarflexion: 3/5       Flexibility:    Popliteal Angle: R = -20 degrees ; L = -20 degrees    Treatment     Nichelle received the treatments listed below:      Nichelle received therapeutic exercises to develop ROM and flexibility for 13 minutes including:    DKTC 3x20 sec  wig wags with ball between knees x1 min  LTR 3x20 sec  NP 2* LE trembling-Supine HS stretch 6x10 seconds  Hamstring stretch, seated 3 x 15s each  NP HS curls, seated, red band x 10  NP DF with red band x 10  LAQ x 10    May add: TA progressions     neuromuscular re-education activities to improve muscle activation and control for 19 minutes. The following activities were included:  TA sets + posterior pelvic tilt + hip adduction with ball x 10  TA sets +march, yellow loop x 20  Bridges, yellow loop 2x10-manual cues for glute activation  Side lying clams 2x10 Y sport loop  SLR x 10  ea  SL hip abd x 15-manual cues to maintain core bracing  NP-Side lying hip adduction x 10-bolster under top leg for comfort  Diaphragmatic breathing x 2 minutes supine  SLS with airex 2 x 30s each  NP_Tandem balance on foam 2 x 30s each  NP Seated TA set + hip add Pilates x15, 5 sec hold-cues for proper holds    therapeutic activities to improve functional performance for 10 minutes, including:  Sit to stand from 16" chair 2x10 (cues for weight shift and glute activation)  Shuttle + TA set 1.5 bands 2x10  Logrolling for back protection      Patient Education and Home Exercises       Education provided:   - -perform exercises in pain free ROM   Instructed pt to wear shoes for therapy that allow ankle mobility for balance and strength training.     Written Home Exercises Provided:  Instructed patient to continue current home exercise program.    Exercises were reviewed and Nichelle was able to demonstrate them prior to the end of the session.  Nichelle demonstrated fair  understanding of the education provided. See Electronic Medical Record under Patient Instructions for " exercises provided during therapy sessions    Assessment     Continued high pain level reported, but pt is reporting this is a muscle soreness from exercising. Moving well through her exercises without guarding. Bilateral LE trembling upon arrival, which is not uncommon for pt-had coffee before session and may have smoked just prior (she is not sure). Decreased cues required for full knee extension with SLR today. Cues for core bracing with supine march with loop, but god bracing and hip stacking with side lying clams. Sit<>stand with more control today. Improving performance with SLS on foam with occasional UE tapping. She will continue to benefit from PT for improved core and LE strength and stabilization for increased ease with functional mobility.    Nichelle is progressing well towards her goals.   Patient prognosis is Fair.     Patient will continue to benefit from skilled outpatient physical therapy to address the deficits listed in the problem list box on initial evaluation, provide pt/family education and to maximize pt's level of independence in the home and community environment.     Patient's spiritual, cultural and educational needs considered and pt agreeable to plan of care and goals.     Anticipated barriers to physical therapy: dementia, dependent on transportation      Goals: Short Term Goals:  4 weeks (progressing, not met)  1.Report decreased    low back    pain  <   / =  5  /10 at its worst  to increase tolerance for ADLs (progressing, not met)  2. Pt to increase B popliteal angle by > or = 10 degrees in order to improve flexibility and posture.   3. Increased R LE strength by 1/3 muscle grade in all deficient planes to increase tolerance for ADLs. (Met)  4. Increased L LE strength by 1/3 muscle grade in all deficient planes to increase tolerance for ADLs. (Met)  5. Pt will report centralization of symptoms to low back only for increased tolerance for ADLs.  (progressing, not met)  6. Pt to  tolerate HEP to improve ROM and independence with ADL's (met)  7. Pt will demonstrate understanding of proper body mechanics for lifting and daily chores.     Long Term Goals: 8 weeks (progressing, not met)  1.Report decreased    low back    pain  <   / =  3  /10  to increase tolerance for ADLs  2.Pt to increase B popliteal angle by > or = 20 degrees in order to improve flexibility and posture.   3.Increased R LE strength by 1 muscle grade in all deficient planes to increase tolerance for ADLs.  4. Increased L LE strength by 1 muscle grade in all deficient planes to increase tolerance for ADLes.  5.Pt will perform SLS at least 10 sec ea for increased safety ambulating on uneven surfaces.  6. Pt to be Independent with HEP to improve ROM and independence with ADL's  7. Pt to demonstrate negative Bridge Test in order to show improved core strength for lumbar stabilization.     Plan     Continue per POC, progressing as appropriate to achieve stated goals.  Progress core and LE strengthening as tolerated for improved functional mobility.    Continue with: Plan of care Certification: 3/4/2024 to 5/3/24.     Outpatient Physical Therapy 2 times weekly for 8 weeks to include the following interventions: Electrical Stimulation  , Gait Training, Manual Therapy, Moist Heat/ Ice, Neuromuscular Re-ed, Patient Education, Self Care, Therapeutic Activities, Therapeutic Exercise, and Ultrasound, and dry needling.     Vivi Louise, PTA

## 2024-04-17 ENCOUNTER — CLINICAL SUPPORT (OUTPATIENT)
Dept: REHABILITATION | Facility: HOSPITAL | Age: 69
End: 2024-04-17
Payer: MEDICARE

## 2024-04-17 DIAGNOSIS — R26.89 DECREASED FUNCTIONAL MOBILITY: ICD-10-CM

## 2024-04-17 DIAGNOSIS — M25.60 DECREASED RANGE OF MOTION: Primary | ICD-10-CM

## 2024-04-17 DIAGNOSIS — M62.81 MUSCLE WEAKNESS: ICD-10-CM

## 2024-04-17 PROCEDURE — 97110 THERAPEUTIC EXERCISES: CPT | Mod: PN,CQ

## 2024-04-17 PROCEDURE — 97530 THERAPEUTIC ACTIVITIES: CPT | Mod: PN,CQ

## 2024-04-17 PROCEDURE — 97112 NEUROMUSCULAR REEDUCATION: CPT | Mod: PN,CQ

## 2024-04-22 ENCOUNTER — CLINICAL SUPPORT (OUTPATIENT)
Dept: REHABILITATION | Facility: HOSPITAL | Age: 69
End: 2024-04-22
Payer: MEDICARE

## 2024-04-22 DIAGNOSIS — M25.60 DECREASED RANGE OF MOTION: Primary | ICD-10-CM

## 2024-04-22 DIAGNOSIS — M62.81 MUSCLE WEAKNESS: ICD-10-CM

## 2024-04-22 DIAGNOSIS — R26.89 DECREASED FUNCTIONAL MOBILITY: ICD-10-CM

## 2024-04-22 PROCEDURE — 97112 NEUROMUSCULAR REEDUCATION: CPT | Mod: PN

## 2024-04-22 PROCEDURE — 97110 THERAPEUTIC EXERCISES: CPT | Mod: PN

## 2024-04-22 NOTE — PROGRESS NOTES
[facility]   Physical Therapy Treatment Note      Name: Nichelle Barone  [facility] Number: [id number]    Therapy Diagnosis:   Encounter Diagnoses   Name Primary?    Decreased range of motion Yes    Muscle weakness     Decreased functional mobility          Physician: MONAE Reed MD    Visit Date: 4/22/2024    Physician Orders: PT Eval and Treat   Medical Diagnosis from Referral:   M54.50 (ICD-10-CM) - Low back pain, unspecified   M54.6 (ICD-10-CM) - Pain in thoracic spine      Evaluation Date: 12/31/2024  Authorization Period Expiration: 2/25/25  Plan of Care Expiration: 5/3/24 (reassessed on 4/8/24)  Visit # / Visits authorized: 13/ 20 +evaluation  FOTO Due visit 5=score 37 (3/2/2024) goal: 37 by visit 13  FOTO Due visit 10= 37 (4/8/24); goal met and FOTO d/c       Time In: 1:00   Time Out: 1:45  Total Time: 45 minutes  Total Billable Time: 45 minutes    Precautions: Standard, dementia    PTA Visit #: 2/5       Subjective     Patient reports: she did her exercises this morning.  She is scheduled to see neurosurgeon on Monday, 4/29/24.    She was compliant with home exercise program twice daily  Response to previous treatment: soreness which is resolved  Functional change: able to stand from chair without UE support     Pain: 8/10  Location: neck    Objective      Lumbar Range of Motion:    % Pain   Flexion 100   none        Extension 100   none        Left Side Bending 100 no      Right Side Bending 90 no        Left rotation   50 no        Right Rotation   75 no             Lower Extremity Strength  Right LE  Left LE    Knee extension: 4-/5 Knee extension: 4/5   Knee flexion: 4+/5 Knee flexion: 5/5   Hip flexion: 4/5 Hip flexion: 4/5   Hip extension:  3+/5  Hip extension: 3+/5    Hip abduction: 4-/5 Hip abduction: 3+/5   Hip adduction: 4-/5 Hip adduction 4-/5   Ankle dorsiflexion: 4-/5 Ankle dorsiflexion: 4/5   Ankle plantarflexion: 3/5 Ankle plantarflexion: 3/5       Flexibility:  "   Popliteal Angle: R = -22 degrees ; L = -20 degrees    SLS: 30 sec B    Treatment     Nichelle received the treatments listed below:      Nichelle received therapeutic exercises to develop ROM and flexibility for 25 minutes including:  Reassessment  DKTC 3x20 sec  wig wags with ball between knees x1 min  LTR 3x20 sec  NP 2* LE trembling-Supine HS stretch 6x10 seconds  Hamstring stretch, seated 3 x 15s each  NP HS curls, seated, red band x 10  DF with red band 2x10  LAQ 2x10, 2#    May add: TA progressions     neuromuscular re-education activities to improve muscle activation and control for 20 minutes. The following activities were included:  TA sets + posterior pelvic tilt + hip adduction with ball x 15  TA sets +march, yellow loop x 20  Bridges, yellow loop x20-manual cues for glute activation  Side lying clams 2x10 Y sport loop  SLR x 10  ea  SL hip abd x 15-manual cues to maintain core bracing  NP-Side lying hip adduction x 10-bolster under top leg for comfort  Diaphragmatic breathing x 2 minutes supine  SLS with airex 2 x 30s each  NP_Tandem balance on foam 2 x 30s each  NP Seated TA set + hip add Pilates x15, 5 sec hold-cues for proper holds    Not performed today due to time: therapeutic activities to improve functional performance for 00 minutes, including:  Sit to stand from 16" chair 2x10 (cues for weight shift and glute activation)  Shuttle + TA set 1.5 bands 2x10  Logrolling for back protection      Patient Education and Home Exercises       Education provided:   - -perform exercises in pain free ROM   Instructed pt to wear shoes for therapy that allow ankle mobility for balance and strength training.     Written Home Exercises Provided:  Instructed patient to continue current home exercise program.    Exercises were reviewed and Nichelle was able to demonstrate them prior to the end of the session.  Nichelle demonstrated fair  understanding of the education provided. See Electronic Medical Record under Patient " Instructions for exercises provided during therapy sessions    Assessment     Pt reassessed today.  She has improved with increased lumbar ROM.  She continues to have some weakness to LEs.  She will follow up with neurosurgeon on Monday.  She will continue to benefit from PT for improved core and LE strength and stabilization for increased ease with functional mobility.    Nichelle is progressing well towards her goals.   Patient prognosis is Fair.     Patient will continue to benefit from skilled outpatient physical therapy to address the deficits listed in the problem list box on initial evaluation, provide pt/family education and to maximize pt's level of independence in the home and community environment.     Patient's spiritual, cultural and educational needs considered and pt agreeable to plan of care and goals.     Anticipated barriers to physical therapy: dementia, dependent on transportation      Goals: Short Term Goals:  4 weeks (progressing, not met)  1.Report decreased    low back    pain  <   / =  5  /10 at its worst  to increase tolerance for ADLs (progressing, not met)  2. Pt to increase B popliteal angle by > or = 10 degrees in order to improve flexibility and posture.   3. Increased R LE strength by 1/3 muscle grade in all deficient planes to increase tolerance for ADLs. (Met)  4. Increased L LE strength by 1/3 muscle grade in all deficient planes to increase tolerance for ADLs. (Met)  5. Pt will report centralization of symptoms to low back only for increased tolerance for ADLs.  (progressing, not met)  6. Pt to tolerate HEP to improve ROM and independence with ADL's (met)  7. Pt will demonstrate understanding of proper body mechanics for lifting and daily chores.     Long Term Goals: 8 weeks   1.Report decreased    low back    pain  <   / =  3  /10  to increase tolerance for ADLs (progressing, not met)  2.Pt to increase B popliteal angle by > or = 20 degrees in order to improve flexibility and  posture. (progressing, not met)  3.Increased R LE strength by 1 muscle grade in all deficient planes to increase tolerance for ADLs. (Partially met)  4. Increased L LE strength by 1 muscle grade in all deficient planes to increase tolerance for ADLes.  (Partially met)  5.Pt will perform SLS at least 10 sec ea for increased safety ambulating on uneven surfaces.  6. Pt to be Independent with HEP to improve ROM and independence with ADL's (met)  7. Pt to demonstrate negative Bridge Test in order to show improved core strength for lumbar stabilization.     Plan     Continue per POC, progressing as appropriate to achieve stated goals.  Progress core and LE strengthening as tolerated for improved functional mobility.    Continue with: Plan of care Certification: 3/4/2024 to 5/3/24.     Outpatient Physical Therapy 2 times weekly for 8 weeks to include the following interventions: Electrical Stimulation  , Gait Training, Manual Therapy, Moist Heat/ Ice, Neuromuscular Re-ed, Patient Education, Self Care, Therapeutic Activities, Therapeutic Exercise, and Ultrasound, and dry needling.     Marlyn Ozuna, PT

## 2024-04-23 NOTE — PROGRESS NOTES
[facility]   Updated Plan of Care and Physical Therapy Treatment Note      Name: Nichelle Barone  Clinic Number: [id number]    Therapy Diagnosis:   Encounter Diagnoses   Name Primary?    Decreased range of motion Yes    Muscle weakness     Decreased functional mobility          Physician: MONAE Reed MD    Visit Date: 4/24/2024    Physician Orders: PT Eval and Treat   Medical Diagnosis from Referral:   M54.50 (ICD-10-CM) - Low back pain, unspecified   M54.6 (ICD-10-CM) - Pain in thoracic spine      Evaluation Date: 12/31/2024  Authorization Period Expiration: 2/25/25  Plan of Care Expiration: 5/24/24 (reassessed on 4/22/24)  Visit # / Visits authorized: 13/ 20 +evaluation  FOTO Due visit 5=score 37 (3/2/2024) goal: 37 by visit 13  FOTO Due visit 10= 37 (4/8/24); goal met and FOTO d/c       Time In: 1:00   Time Out: 1:45  Total Time: 45 minutes  Total Billable Time: 45 minutes    Precautions: Standard, dementia    PTA Visit #: 2/5       Subjective     Patient reports: she slept better last night.  She woke up with increased low back pain this morning.    She was compliant with home exercise program twice daily  Response to previous treatment: soreness which is resolved  Functional change: able to stand from chair without UE support     Pain: 8/10  Location: neck    Objective        Lumbar Range of Motion:    % Pain   Flexion 100   none        Extension 100   none        Left Side Bending 100 no      Right Side Bending 90 no        Left rotation   50 no        Right Rotation   75 no             Lower Extremity Strength  Right LE  Left LE    Knee extension: 4-/5 Knee extension: 4/5   Knee flexion: 4+/5 Knee flexion: 5/5   Hip flexion: 4/5 Hip flexion: 4/5   Hip extension:  3+/5  Hip extension: 3+/5    Hip abduction: 4-/5 Hip abduction: 3+/5   Hip adduction: 4-/5 Hip adduction 4-/5   Ankle dorsiflexion: 4-/5 Ankle dorsiflexion: 4/5   Ankle plantarflexion: 3/5 Ankle plantarflexion: 3/5  "      Flexibility:    Popliteal Angle: R = -22 degrees ; L = -20 degrees    SLS: 30 sec B    Treatment     Nichelle received the treatments listed below:      Nichelle received therapeutic exercises to develop ROM and flexibility for 20 minutes including:  DKTC 3x20 sec  wig wags with ball between knees x1 min  LTR 3x20 sec  NP 2* LE trembling-Supine HS stretch 6x10 seconds  Hamstring stretch, seated 3 x 15s each  NP HS curls, seated, red band x 10  NP DF with red band 2x10  NP LAQ 2x10, 2#     neuromuscular re-education activities to improve muscle activation and control for 15 minutes. The following activities were included:  TA sets + posterior pelvic tilt + hip adduction with ball x 20  TA sets +march, yellow loop x 20  Bridges, yellow loop x20-manual cues for glute activation  Side lying clams 2x10 Y sport loop  SLR x 10  ea  NP SL hip abd x 15-manual cues to maintain core bracing  NP-Side lying hip adduction x 10-bolster under top leg for comfort  SLS with airex 2 x 30s each  NP_Tandem balance on foam 2 x 30s each  NP Seated TA set + hip add Pilates x15, 5 sec hold-cues for proper holds    therapeutic activities to improve functional performance for 10 minutes, including:  Sit to stand from 16" chair 2x10   Shuttle + TA set 2 bands 2x10  Logrolling for back protection      Patient Education and Home Exercises       Education provided:   - -perform exercises in pain free ROM   Instructed pt to wear shoes for therapy that allow ankle mobility for balance and strength training.     Written Home Exercises Provided:  Instructed patient to continue current home exercise program.    Exercises were reviewed and Nichelle was able to demonstrate them prior to the end of the session.  Nichelle demonstrated fair  understanding of the education provided. See Electronic Medical Record under Patient Instructions for exercises provided during therapy sessions    Assessment     Pt continues to report severe low back pain.    She tolerated " treatment session well.  She reported decreased pain to 7/10 to low back and abdomen post tx.  She is scheduled to see neurosurgeon on Monday.  Will follow MD recommendations for continuation of PT.    Nichelle is progressing well towards her goals.   Patient prognosis is Fair.     Patient will continue to benefit from skilled outpatient physical therapy to address the deficits listed in the problem list box on initial evaluation, provide pt/family education and to maximize pt's level of independence in the home and community environment.     Patient's spiritual, cultural and educational needs considered and pt agreeable to plan of care and goals.     Anticipated barriers to physical therapy: dementia, dependent on transportation      Goals: Short Term Goals:  4 weeks (progressing, not met)  1.Report decreased    low back    pain  <   / =  5  /10 at its worst  to increase tolerance for ADLs (progressing, not met)  2. Pt to increase B popliteal angle by > or = 10 degrees in order to improve flexibility and posture.   3. Increased R LE strength by 1/3 muscle grade in all deficient planes to increase tolerance for ADLs. (Met)  4. Increased L LE strength by 1/3 muscle grade in all deficient planes to increase tolerance for ADLs. (Met)  5. Pt will report centralization of symptoms to low back only for increased tolerance for ADLs.  (progressing, not met)  6. Pt to tolerate HEP to improve ROM and independence with ADL's (met)  7. Pt will demonstrate understanding of proper body mechanics for lifting and daily chores.     Long Term Goals: 8 weeks   1.Report decreased    low back    pain  <   / =  3  /10  to increase tolerance for ADLs (progressing, not met)  2.Pt to increase B popliteal angle by > or = 20 degrees in order to improve flexibility and posture. (progressing, not met)  3.Increased R LE strength by 1 muscle grade in all deficient planes to increase tolerance for ADLs. (Partially met)  4. Increased L LE strength by  1 muscle grade in all deficient planes to increase tolerance for ADLes.  (Partially met)  5.Pt will perform SLS at least 10 sec ea for increased safety ambulating on uneven surfaces.  6. Pt to be Independent with HEP to improve ROM and independence with ADL's (met)  7. Pt to demonstrate negative Bridge Test in order to show improved core strength for lumbar stabilization.     Plan     Continue per POC, progressing as appropriate to achieve stated goals.  Progress core and LE strengthening as tolerated for improved functional mobility.    Continue with: Plan of care Certification: 4/24/2024 to 5/24/24.     Outpatient Physical Therapy 1-2 times weekly for 4 more weeks to include the following interventions: Electrical Stimulation  , Gait Training, Manual Therapy, Moist Heat/ Ice, Neuromuscular Re-ed, Patient Education, Self Care, Therapeutic Activities, Therapeutic Exercise, and Ultrasound, and dry needling.     Marlyn Ozuna, PT

## 2024-04-24 ENCOUNTER — CLINICAL SUPPORT (OUTPATIENT)
Dept: REHABILITATION | Facility: HOSPITAL | Age: 69
End: 2024-04-24
Payer: MEDICARE

## 2024-04-24 DIAGNOSIS — M62.81 MUSCLE WEAKNESS: ICD-10-CM

## 2024-04-24 DIAGNOSIS — M25.60 DECREASED RANGE OF MOTION: Primary | ICD-10-CM

## 2024-04-24 DIAGNOSIS — R26.89 DECREASED FUNCTIONAL MOBILITY: ICD-10-CM

## 2024-04-24 PROCEDURE — 97110 THERAPEUTIC EXERCISES: CPT | Mod: PN

## 2024-04-24 PROCEDURE — 97530 THERAPEUTIC ACTIVITIES: CPT | Mod: PN

## 2024-04-24 PROCEDURE — 97112 NEUROMUSCULAR REEDUCATION: CPT | Mod: PN

## 2024-04-29 ENCOUNTER — OFFICE VISIT (OUTPATIENT)
Dept: NEUROSURGERY | Facility: CLINIC | Age: 69
End: 2024-04-29
Payer: MEDICARE

## 2024-04-29 VITALS
WEIGHT: 126.13 LBS | DIASTOLIC BLOOD PRESSURE: 99 MMHG | HEIGHT: 63 IN | SYSTOLIC BLOOD PRESSURE: 146 MMHG | BODY MASS INDEX: 22.35 KG/M2 | HEART RATE: 105 BPM

## 2024-04-29 DIAGNOSIS — M54.16 LUMBAR RADICULOPATHY: ICD-10-CM

## 2024-04-29 DIAGNOSIS — M48.061 LUMBAR STENOSIS WITHOUT NEUROGENIC CLAUDICATION: ICD-10-CM

## 2024-04-29 DIAGNOSIS — M51.36 DDD (DEGENERATIVE DISC DISEASE), LUMBAR: ICD-10-CM

## 2024-04-29 DIAGNOSIS — M50.30 DDD (DEGENERATIVE DISC DISEASE), CERVICAL: Primary | ICD-10-CM

## 2024-04-29 DIAGNOSIS — Z98.1 STATUS POST CERVICAL SPINAL FUSION: ICD-10-CM

## 2024-04-29 DIAGNOSIS — M43.17 ACQUIRED SPONDYLOLISTHESIS OF LUMBOSACRAL REGION: ICD-10-CM

## 2024-04-29 PROCEDURE — 99214 OFFICE O/P EST MOD 30 MIN: CPT | Mod: S$PBB,,, | Performed by: NEUROLOGICAL SURGERY

## 2024-04-29 RX ORDER — HYDROCODONE BITARTRATE AND ACETAMINOPHEN 5; 325 MG/1; MG/1
1 TABLET ORAL EVERY 6 HOURS PRN
Qty: 28 TABLET | Refills: 0 | Status: ON HOLD | OUTPATIENT
Start: 2024-04-29 | End: 2024-05-07 | Stop reason: HOSPADM

## 2024-04-29 RX ORDER — METHOCARBAMOL 750 MG/1
750 TABLET, FILM COATED ORAL 3 TIMES DAILY PRN
Qty: 90 TABLET | Refills: 0 | Status: SHIPPED | OUTPATIENT
Start: 2024-04-29 | End: 2024-05-29

## 2024-04-29 NOTE — H&P (VIEW-ONLY)
The patient is status post uneventful ACDF C5-7 for severe cervical stenosis with myelopathy 03/29/2023.  She returns today with worsening axial low back pain with radiation into bilateral hip, anterior thigh.  She notes her legs feel heavy with standing or walking.  She denies focal weakness saddle anesthesia.  She has chronic bowel and bladder dysfunction, reports no worsening.  She was seen outside pain management he was considering interventional procedures but requested neurosurgical evaluation prior to proceeding.  She continues to smoke at least a half pack per day.  She has a history of osteoporosis.    She denies neck pain, radicular symptoms, myelopathy symptoms    MRI lumbar spine 03/01/2024 reviewed in detail with the patient and .  Pertinent findings:  Grade 1 spondylolisthesis L4-5, moderate to severe canal and recess stenosis L4-5.  Effusions L4-5.    Exam:    Awake, alert, oriented to person place and time.    Cranial nerves 2-12 grossly intact.    Strength is graded 5/5 in all muscle groups.    Sensation is grossly intact throughout.    Gait and stance are normal  Tender to palpation midline middle upper lumbar region and midline sacral regions.    Analysis:  Given her radiographic findings and history of long-term active heavy smoking as well as osteoporosis I recommended exhausting all nonoperative management.  She may proceed with interlaminar L4-5 STELLA from my standpoint.  She is in physical therapy reports some improvement of her symptoms.  Recommend continue physical therapy.  I gave her a short course of pain medication which she repeatedly requested as well as Robaxin.  Future medication refills per pain management.  I gave her a TENS unit for symptom management.  We will be glad to see her back with refractory symptoms.    Nichelle was seen today for back pain and neck pain.    Diagnoses and all orders for this visit:    DDD (degenerative disc disease), cervical    DDD (degenerative  disc disease), lumbar    Lumbar stenosis without neurogenic claudication    Acquired spondylolisthesis of lumbosacral region    Lumbar radiculopathy    Status post cervical spinal fusion      I spent a total of  30 minutes on the day of the visit.This includes face to face time and non-face to face time preparing to see the patient (eg, review of tests), obtaining and/or reviewing separately obtained history, documenting clinical information in the electronic or other health record, independently interpreting results and communicating results to the patient/family/caregiver, or care coordinator.

## 2024-04-30 ENCOUNTER — TELEPHONE (OUTPATIENT)
Dept: NEUROSURGERY | Facility: CLINIC | Age: 69
End: 2024-04-30
Payer: MEDICARE

## 2024-04-30 ENCOUNTER — TELEPHONE (OUTPATIENT)
Dept: PAIN MEDICINE | Facility: CLINIC | Age: 69
End: 2024-04-30
Payer: MEDICARE

## 2024-04-30 DIAGNOSIS — M54.16 LUMBAR RADICULOPATHY: Primary | ICD-10-CM

## 2024-04-30 DIAGNOSIS — M48.061 SPINAL STENOSIS OF LUMBAR REGION, UNSPECIFIED WHETHER NEUROGENIC CLAUDICATION PRESENT: Primary | ICD-10-CM

## 2024-04-30 NOTE — PLAN OF CARE
[facility]   Updated Plan of Care and Physical Therapy Treatment Note      Name: Nichelle Barone  Clinic Number: [id number]    Therapy Diagnosis:   Encounter Diagnoses   Name Primary?    Decreased range of motion Yes    Muscle weakness     Decreased functional mobility          Physician: MONAE Reed MD    Visit Date: 4/24/2024    Physician Orders: PT Eval and Treat   Medical Diagnosis from Referral:   M54.50 (ICD-10-CM) - Low back pain, unspecified   M54.6 (ICD-10-CM) - Pain in thoracic spine      Evaluation Date: 12/31/2024  Authorization Period Expiration: 2/25/25  Plan of Care Expiration: 5/24/24 (reassessed on 4/22/24)  Visit # / Visits authorized: 13/ 20 +evaluation  FOTO Due visit 5=score 37 (3/2/2024) goal: 37 by visit 13  FOTO Due visit 10= 37 (4/8/24); goal met and FOTO d/c       Time In: 1:00   Time Out: 1:45  Total Time: 45 minutes  Total Billable Time: 45 minutes    Precautions: Standard, dementia    PTA Visit #: 2/5       Subjective     Patient reports: she slept better last night.  She woke up with increased low back pain this morning.    She was compliant with home exercise program twice daily  Response to previous treatment: soreness which is resolved  Functional change: able to stand from chair without UE support     Pain: 8/10  Location: neck    Objective        Lumbar Range of Motion:    % Pain   Flexion 100   none        Extension 100   none        Left Side Bending 100 no      Right Side Bending 90 no        Left rotation   50 no        Right Rotation   75 no             Lower Extremity Strength  Right LE  Left LE    Knee extension: 4-/5 Knee extension: 4/5   Knee flexion: 4+/5 Knee flexion: 5/5   Hip flexion: 4/5 Hip flexion: 4/5   Hip extension:  3+/5  Hip extension: 3+/5    Hip abduction: 4-/5 Hip abduction: 3+/5   Hip adduction: 4-/5 Hip adduction 4-/5   Ankle dorsiflexion: 4-/5 Ankle dorsiflexion: 4/5   Ankle plantarflexion: 3/5 Ankle plantarflexion: 3/5  "      Flexibility:    Popliteal Angle: R = -22 degrees ; L = -20 degrees    SLS: 30 sec B    Treatment     Nichelle received the treatments listed below:      Nichelle received therapeutic exercises to develop ROM and flexibility for 20 minutes including:  DKTC 3x20 sec  wig wags with ball between knees x1 min  LTR 3x20 sec  NP 2* LE trembling-Supine HS stretch 6x10 seconds  Hamstring stretch, seated 3 x 15s each  NP HS curls, seated, red band x 10  NP DF with red band 2x10  NP LAQ 2x10, 2#     neuromuscular re-education activities to improve muscle activation and control for 15 minutes. The following activities were included:  TA sets + posterior pelvic tilt + hip adduction with ball x 20  TA sets +march, yellow loop x 20  Bridges, yellow loop x20-manual cues for glute activation  Side lying clams 2x10 Y sport loop  SLR x 10  ea  NP SL hip abd x 15-manual cues to maintain core bracing  NP-Side lying hip adduction x 10-bolster under top leg for comfort  SLS with airex 2 x 30s each  NP_Tandem balance on foam 2 x 30s each  NP Seated TA set + hip add Pilates x15, 5 sec hold-cues for proper holds    therapeutic activities to improve functional performance for 10 minutes, including:  Sit to stand from 16" chair 2x10   Shuttle + TA set 2 bands 2x10  Logrolling for back protection      Patient Education and Home Exercises       Education provided:   - -perform exercises in pain free ROM   Instructed pt to wear shoes for therapy that allow ankle mobility for balance and strength training.     Written Home Exercises Provided:  Instructed patient to continue current home exercise program.    Exercises were reviewed and Nichelle was able to demonstrate them prior to the end of the session.  Nichelle demonstrated fair  understanding of the education provided. See Electronic Medical Record under Patient Instructions for exercises provided during therapy sessions    Assessment     Pt continues to report severe low back pain.    She tolerated " treatment session well.  She reported decreased pain to 7/10 to low back and abdomen post tx.  She is scheduled to see neurosurgeon on Monday.  Will follow MD recommendations for continuation of PT.    Nichelle is progressing well towards her goals.   Patient prognosis is Fair.     Patient will continue to benefit from skilled outpatient physical therapy to address the deficits listed in the problem list box on initial evaluation, provide pt/family education and to maximize pt's level of independence in the home and community environment.     Patient's spiritual, cultural and educational needs considered and pt agreeable to plan of care and goals.     Anticipated barriers to physical therapy: dementia, dependent on transportation      Goals: Short Term Goals:  4 weeks (progressing, not met)  1.Report decreased    low back    pain  <   / =  5  /10 at its worst  to increase tolerance for ADLs (progressing, not met)  2. Pt to increase B popliteal angle by > or = 10 degrees in order to improve flexibility and posture.   3. Increased R LE strength by 1/3 muscle grade in all deficient planes to increase tolerance for ADLs. (Met)  4. Increased L LE strength by 1/3 muscle grade in all deficient planes to increase tolerance for ADLs. (Met)  5. Pt will report centralization of symptoms to low back only for increased tolerance for ADLs.  (progressing, not met)  6. Pt to tolerate HEP to improve ROM and independence with ADL's (met)  7. Pt will demonstrate understanding of proper body mechanics for lifting and daily chores.     Long Term Goals: 8 weeks   1.Report decreased    low back    pain  <   / =  3  /10  to increase tolerance for ADLs (progressing, not met)  2.Pt to increase B popliteal angle by > or = 20 degrees in order to improve flexibility and posture. (progressing, not met)  3.Increased R LE strength by 1 muscle grade in all deficient planes to increase tolerance for ADLs. (Partially met)  4. Increased L LE strength by  1 muscle grade in all deficient planes to increase tolerance for ADLes.  (Partially met)  5.Pt will perform SLS at least 10 sec ea for increased safety ambulating on uneven surfaces.  6. Pt to be Independent with HEP to improve ROM and independence with ADL's (met)  7. Pt to demonstrate negative Bridge Test in order to show improved core strength for lumbar stabilization.     Plan     Continue per POC, progressing as appropriate to achieve stated goals.  Progress core and LE strengthening as tolerated for improved functional mobility.    Continue with: Plan of care Certification: 3/4/2024 to 5/3/24.     Outpatient Physical Therapy 2 times weekly for 8 weeks to include the following interventions: Electrical Stimulation  , Gait Training, Manual Therapy, Moist Heat/ Ice, Neuromuscular Re-ed, Patient Education, Self Care, Therapeutic Activities, Therapeutic Exercise, and Ultrasound, and dry needling.     Marlyn Ozuna, PT [facility] OUTPATIENT THERAPY AND WELLNESS   Physical Therapy Treatment Note      [id number]    Therapy Diagnosis:   Encounter Diagnoses   Name Primary?    Decreased range of motion Yes    Muscle weakness     Decreased functional mobility            Physician: MONAE Reed MD    Visit Date: 4/24/2024    Physician Orders: PT Eval and Treat   Medical Diagnosis from Referral:   M54.50 (ICD-10-CM) - Low back pain, unspecified   M54.6 (ICD-10-CM) - Pain in thoracic spine      Evaluation Date: 12/31/2024  Authorization Period Expiration: 2/25/25  Plan of Care Expiration: 5/3/24 (reassessed on 4/8/24)  Visit # / Visits authorized: 14/ 20 +evaluation  FOTO Due visit 5=score 37 (3/2/2024) goal: 37 by visit 13  FOTO Due visit 10= 37 (4/8/24); goal met and FOTO d/c       Time In: 1:00   Time Out: 1:45  Total Time: 45 minutes  Total Billable Time: 45 minutes    Precautions: Standard, dementia    PTA Visit #: 2/5       Subjective     Patient reports: she did her  exercises this morning.  She is scheduled to see neurosurgeon on Monday, 4/29/24.    She was compliant with home exercise program twice daily  Response to previous treatment: soreness which is resolved  Functional change: able to stand from chair without UE support     Pain: 8/10  Location: neck    Objective        Not performed this date:  Lumbar Range of Motion:    % Pain   Flexion 100   none        Extension 100   none        Left Side Bending 100 no      Right Side Bending 90 no        Left rotation   50 no        Right Rotation   75 no             Lower Extremity Strength  Right LE  Left LE    Knee extension: 4-/5 Knee extension: 4/5   Knee flexion: 4+/5 Knee flexion: 5/5   Hip flexion: 4/5 Hip flexion: 4/5   Hip extension:  3+/5  Hip extension: 3+/5    Hip abduction: 4-/5 Hip abduction: 3+/5   Hip adduction: 4-/5 Hip adduction 4-/5   Ankle dorsiflexion: 4-/5 Ankle dorsiflexion: 4/5   Ankle plantarflexion: 3/5 Ankle plantarflexion: 3/5       Flexibility:    Popliteal Angle: R = -22 degrees ; L = -20 degrees    SLS: 30 sec B    Treatment     Nichelle received the treatments listed below:      Nichelle received therapeutic exercises to develop ROM and flexibility for 25 minutes including:  Reassessment  DKTC 3x20 sec  wig wags with ball between knees x1 min  LTR 3x20 sec  NP 2* LE trembling-Supine HS stretch 6x10 seconds  Hamstring stretch, seated 3 x 15s each  NP HS curls, seated, red band x 10  DF with red band 2x10  LAQ 2x10, 2#    May add: TA progressions     neuromuscular re-education activities to improve muscle activation and control for 20 minutes. The following activities were included:  TA sets + posterior pelvic tilt + hip adduction with ball x 15  TA sets +march, yellow loop x 20  Bridges, yellow loop x20-manual cues for glute activation  Side lying clams 2x10 Y sport loop  SLR x 10  ea  SL hip abd x 15-manual cues to maintain core bracing  NP-Side lying hip adduction x 10-bolster under top leg for  "comfort  Diaphragmatic breathing x 2 minutes supine  SLS with airex 2 x 30s each  NP_Tandem balance on foam 2 x 30s each  NP Seated TA set + hip add Pilates x15, 5 sec hold-cues for proper holds    Not performed today due to time: therapeutic activities to improve functional performance for 00 minutes, including:  Sit to stand from 16" chair 2x10 (cues for weight shift and glute activation)  Shuttle + TA set 1.5 bands 2x10  Logrolling for back protection      Patient Education and Home Exercises       Education provided:   - -perform exercises in pain free ROM   Instructed pt to wear shoes for therapy that allow ankle mobility for balance and strength training.     Written Home Exercises Provided:  Instructed patient to continue current home exercise program.    Exercises were reviewed and Nichelle was able to demonstrate them prior to the end of the session.  Nichelle demonstrated fair  understanding of the education provided. See Electronic Medical Record under Patient Instructions for exercises provided during therapy sessions    Assessment     Pt reassessed today.  She has improved with increased lumbar ROM.  She continues to have some weakness to LEs.  She will follow up with neurosurgeon on Monday.  She will continue to benefit from PT for improved core and LE strength and stabilization for increased ease with functional mobility.    Nichelle is progressing well towards her goals.   Patient prognosis is Fair.     Patient will continue to benefit from skilled outpatient physical therapy to address the deficits listed in the problem list box on initial evaluation, provide pt/family education and to maximize pt's level of independence in the home and community environment.     Patient's spiritual, cultural and educational needs considered and pt agreeable to plan of care and goals.     Anticipated barriers to physical therapy: dementia, dependent on transportation      Goals: Short Term Goals:  4 weeks (progressing, not " met)  1.Report decreased    low back    pain  <   / =  5  /10 at its worst  to increase tolerance for ADLs (progressing, not met)  2. Pt to increase B popliteal angle by > or = 10 degrees in order to improve flexibility and posture.   3. Increased R LE strength by 1/3 muscle grade in all deficient planes to increase tolerance for ADLs. (Met)  4. Increased L LE strength by 1/3 muscle grade in all deficient planes to increase tolerance for ADLs. (Met)  5. Pt will report centralization of symptoms to low back only for increased tolerance for ADLs.  (progressing, not met)  6. Pt to tolerate HEP to improve ROM and independence with ADL's (met)  7. Pt will demonstrate understanding of proper body mechanics for lifting and daily chores.     Long Term Goals: 8 weeks   1.Report decreased    low back    pain  <   / =  3  /10  to increase tolerance for ADLs (progressing, not met)  2.Pt to increase B popliteal angle by > or = 20 degrees in order to improve flexibility and posture. (progressing, not met)  3.Increased R LE strength by 1 muscle grade in all deficient planes to increase tolerance for ADLs. (Partially met)  4. Increased L LE strength by 1 muscle grade in all deficient planes to increase tolerance for ADLes.  (Partially met)  5.Pt will perform SLS at least 10 sec ea for increased safety ambulating on uneven surfaces.  6. Pt to be Independent with HEP to improve ROM and independence with ADL's (met)  7. Pt to demonstrate negative Bridge Test in order to show improved core strength for lumbar stabilization.     Plan     Continue per POC, progressing as appropriate to achieve stated goals.  Progress core and LE strengthening as tolerated for improved functional mobility.    Continue with: Plan of care Certification: 4/22/2024 to 5/24/24.     Outpatient Physical Therapy 1-2 times weekly for 4 more weeks to include the following interventions: Electrical Stimulation  , Gait Training, Manual Therapy, Moist Heat/ Ice,  Neuromuscular Re-ed, Patient Education, Self Care, Therapeutic Activities, Therapeutic Exercise, and Ultrasound, and dry needling.     Marlyn Ozuna, PT

## 2024-04-30 NOTE — TELEPHONE ENCOUNTER
Returned call to pt's spouse. He reports that pt's current pain management provider is out the month of May. [facility]. Informed that new referral has been placed and he will be contacted by Pain Management staff to schedule injection. He voiced understanding.

## 2024-04-30 NOTE — TELEPHONE ENCOUNTER
Types of orders made on 04/30/2024: Procedure Request      Order Date:4/30/2024   Ordering User:ANN-MARIE COTTO [[id number]]   Encounter Provider:Ann-Marie Cotto RN [[id number]]   Corwin martinez Provider: Soto Heller DO [[id number]]   Department:[facility] NEUROSURGERY[[id number]]      Common Order Information   Procedure -> Epidural Injection (specify level) Cmt: interlaminar L4-5 STELLA      Order Specific Information   Order: Procedure Order to Pain Management [Custom: [id number]]  Order #:          [id number]: 1 FUTURE     Priority: Routine  Class: Clinic Performed     Future Order Information       Exp   ires on:04/30/2025            Expected by:04/30/2024                   Associated Diagnoses       M48.061 Spinal stenosis of lumbar region, unspecified whether neurogenic       claudication present       Facility Name: -> [place]              Priority: Routine  Class: Clinic Performed     Future Order Information       Expires on:04/30/2025            Expected by:04/30/2024                   Associated Diagnoses          M48.061 Spinal stenosis of lumbar region, unspecified whether neurogenic       claudication present       Procedure -> Epidural Injection (specify level) Cmt: interlaminar L4-5 STELLA   Ok to schedule?

## 2024-04-30 NOTE — TELEPHONE ENCOUNTER
----- Message from Stacey M Lefort sent at 4/30/2024  8:31 AM CDT -----  Pt is requesting a callback at [phone]

## 2024-04-30 NOTE — PROGRESS NOTES
[facility]   Physical Therapy Treatment Note      Name: Nichelle [facility] Number: [id number]    Therapy Diagnosis:   Encounter Diagnoses   Name Primary?    Decreased range of motion Yes    Muscle weakness     Decreased functional mobility        Physician: MONAE Reed MD    Visit Date: 5/1/2024    Physician Orders: PT Eval and Treat   Medical Diagnosis from Referral:   M54.50 (ICD-10-CM) - Low back pain, unspecified   M54.6 (ICD-10-CM) - Pain in thoracic spine      Evaluation Date: 12/31/2024  Authorization Period Expiration: 2/25/25  Plan of Care Expiration: 5/24/24 (reassessed on 4/24/24)  Visit # / Visits authorized: 13/ 20 +evaluation  FOTO Due visit 5=score 37 (3/2/2024) goal: 37 by visit 13  FOTO Due visit 10= 37 (4/8/24); goal met and FOTO d/c       Time In: 3:20   Time Out: 4:00  Total Time: 40 minutes  Total Billable Time: 40 minutes    Precautions: Standard, dementia    PTA Visit #: 2/5       Subjective     Patient reports: she took her meds and then vomited.    She was compliant with home exercise program twice daily  Response to previous treatment: soreness which is resolved  Functional change: able to stand from chair without UE support     Pain: 8/10  Location: low back    Objective        Treatment     Nichelle received the treatments listed below:      Nichelle received therapeutic exercises to develop ROM and flexibility for 17 minutes including:  DKTC 3x20 sec  wig wags with ball between knees x1 min  LTR 3x20 sec  NP 2* LE trembling-Supine HS stretch 6x10 seconds  Hamstring stretch, seated 3 x 15s each  NP HS curls, seated, red band x 10  DF with GTB x20  LAQ 2x10, 3#     neuromuscular re-education activities to improve muscle activation and control for 15 minutes. The following activities were included:  NP TA sets + posterior pelvic tilt + hip adduction with ball x 20  TA sets +march, yellow loop x 20  Bridges, yellow loop x20-manual cues for glute activation  Side  "lying clams 2x10 Y sport loop  SLR 2x10  ea  NP SL hip abd x 15-manual cues to maintain core bracing  NP-Side lying hip adduction x 10-bolster under top leg for comfort  NP SLS with airex 2 x 30s each  NP_Tandem balance on foam 2 x 30s each  NP Seated TA set + hip add Pilates x15, 5 sec hold-cues for proper holds    therapeutic activities to improve functional performance for 8 minutes, including:  Sit to stand from 16" chair x15   Shuttle + TA set 2.5 bands 2x10      Patient Education and Home Exercises       Education provided:   - -perform exercises in pain free ROM   Instructed pt to wear shoes for therapy that allow ankle mobility for balance and strength training.     Written Home Exercises Provided:  Instructed patient to continue current home exercise program.    Exercises were reviewed and Nichelle was able to demonstrate them prior to the end of the session.  Nichelle demonstrated fair  understanding of the education provided. See Electronic Medical Record under Patient Instructions for exercises provided during therapy sessions    Assessment     Pt able to perform increased intensity for strengthening exercises without exacerbation of symptoms.  She reported decreased pain post treatment.  She is scheduled for STELLA next week.    Nichelle is progressing well towards her goals.   Patient prognosis is Fair.     Patient will continue to benefit from skilled outpatient physical therapy to address the deficits listed in the problem list box on initial evaluation, provide pt/family education and to maximize pt's level of independence in the home and community environment.     Patient's spiritual, cultural and educational needs considered and pt agreeable to plan of care and goals.     Anticipated barriers to physical therapy: dementia, dependent on transportation      Goals: Short Term Goals:  4 weeks   1.Report decreased    low back    pain  <   / =  5  /10 at its worst  to increase tolerance for ADLs (progressing, not " met)  2. Pt to increase B popliteal angle by > or = 10 degrees in order to improve flexibility and posture.   3. Increased R LE strength by 1/3 muscle grade in all deficient planes to increase tolerance for ADLs. (Met)  4. Increased L LE strength by 1/3 muscle grade in all deficient planes to increase tolerance for ADLs. (Met)  5. Pt will report centralization of symptoms to low back only for increased tolerance for ADLs.  (progressing, not met)  6. Pt to tolerate HEP to improve ROM and independence with ADL's (met)  7. Pt will demonstrate understanding of proper body mechanics for lifting and daily chores.     Long Term Goals: 8 weeks   1.Report decreased    low back    pain  <   / =  3  /10  to increase tolerance for ADLs (progressing, not met)  2.Pt to increase B popliteal angle by > or = 20 degrees in order to improve flexibility and posture. (progressing, not met)  3.Increased R LE strength by 1 muscle grade in all deficient planes to increase tolerance for ADLs. (Partially met)  4. Increased L LE strength by 1 muscle grade in all deficient planes to increase tolerance for ADLes.  (Partially met)  5.Pt will perform SLS at least 10 sec ea for increased safety ambulating on uneven surfaces.  6. Pt to be Independent with HEP to improve ROM and independence with ADL's (met)  7. Pt to demonstrate negative Bridge Test in order to show improved core strength for lumbar stabilization.     Plan     Continue per POC, progressing as appropriate to achieve stated goals.  Progress core and LE strengthening as tolerated for improved functional mobility.    Continue with: Plan of care Certification: 3/4/2024 to 5/3/24.     Outpatient Physical Therapy 2 times weekly for 8 weeks to include the following interventions: Electrical Stimulation  , Gait Training, Manual Therapy, Moist Heat/ Ice, Neuromuscular Re-ed, Patient Education, Self Care, Therapeutic Activities, Therapeutic Exercise, and Ultrasound, and dry needling.      Marlyn Ozuna, [facility]   Physical Therapy Treatment Note      Name: Nichelle Barone  Clinic Number: [id number]    Therapy Diagnosis:   Encounter Diagnoses   Name Primary?    Decreased range of motion Yes    Muscle weakness     Decreased functional mobility              Physician: MONAE Reed MD    Visit Date: 5/1/2024    Physician Orders: PT Eval and Treat   Medical Diagnosis from Referral:   M54.50 (ICD-10-CM) - Low back pain, unspecified   M54.6 (ICD-10-CM) - Pain in thoracic spine      Evaluation Date: 12/31/2024  Authorization Period Expiration: 2/25/25  Plan of Care Expiration: 5/3/24 (reassessed on 4/8/24)  Visit # / Visits authorized: 14/ 20 +evaluation  FOTO Due visit 5=score 37 (3/2/2024) goal: 37 by visit 13  FOTO Due visit 10= 37 (4/8/24); goal met and FOTO d/c       Time In: 1:00   Time Out: 1:45  Total Time: 45 minutes  Total Billable Time: 45 minutes    Precautions: Standard, dementia    PTA Visit #: 2/5       Subjective     Patient reports: she did her exercises this morning.  She is scheduled to see neurosurgeon on Monday, 4/29/24.    She was compliant with home exercise program twice daily  Response to previous treatment: soreness which is resolved  Functional change: able to stand from chair without UE support     Pain: 8/10  Location: neck    Objective        Not performed this date:  Lumbar Range of Motion:    % Pain   Flexion 100   none        Extension 100   none        Left Side Bending 100 no      Right Side Bending 90 no        Left rotation   50 no        Right Rotation   75 no             Lower Extremity Strength  Right LE  Left LE    Knee extension: 4-/5 Knee extension: 4/5   Knee flexion: 4+/5 Knee flexion: 5/5   Hip flexion: 4/5 Hip flexion: 4/5   Hip extension:  3+/5  Hip extension: 3+/5    Hip abduction: 4-/5 Hip abduction: 3+/5   Hip adduction: 4-/5 Hip adduction 4-/5   Ankle dorsiflexion: 4-/5 Ankle dorsiflexion: 4/5   Ankle  "plantarflexion: 3/5 Ankle plantarflexion: 3/5       Flexibility:    Popliteal Angle: R = -22 degrees ; L = -20 degrees    SLS: 30 sec B    Treatment     Nichelle received the treatments listed below:      Nichelle received therapeutic exercises to develop ROM and flexibility for 25 minutes including:  Reassessment  DKTC 3x20 sec  wig wags with ball between knees x1 min  LTR 3x20 sec  NP 2* LE trembling-Supine HS stretch 6x10 seconds  Hamstring stretch, seated 3 x 15s each  NP HS curls, seated, red band x 10  DF with red band 2x10  LAQ 2x10, 2#    May add: TA progressions     neuromuscular re-education activities to improve muscle activation and control for 20 minutes. The following activities were included:  TA sets + posterior pelvic tilt + hip adduction with ball x 15  TA sets +march, yellow loop x 20  Bridges, yellow loop x20-manual cues for glute activation  Side lying clams 2x10 Y sport loop  SLR x 10  ea  SL hip abd x 15-manual cues to maintain core bracing  NP-Side lying hip adduction x 10-bolster under top leg for comfort  Diaphragmatic breathing x 2 minutes supine  SLS with airex 2 x 30s each  NP_Tandem balance on foam 2 x 30s each  NP Seated TA set + hip add Pilates x15, 5 sec hold-cues for proper holds    Not performed today due to time: therapeutic activities to improve functional performance for 00 minutes, including:  Sit to stand from 16" chair 2x10 (cues for weight shift and glute activation)  Shuttle + TA set 1.5 bands 2x10  Logrolling for back protection      Patient Education and Home Exercises       Education provided:   - -perform exercises in pain free ROM   Instructed pt to wear shoes for therapy that allow ankle mobility for balance and strength training.     Written Home Exercises Provided:  Instructed patient to continue current home exercise program.    Exercises were reviewed and Nichelle was able to demonstrate them prior to the end of the session.  Nichelle demonstrated fair  understanding of the " education provided. See Electronic Medical Record under Patient Instructions for exercises provided during therapy sessions    Assessment     Pt reassessed today.  She has improved with increased lumbar ROM.  She continues to have some weakness to LEs.  She will follow up with neurosurgeon on Monday.  She will continue to benefit from PT for improved core and LE strength and stabilization for increased ease with functional mobility.    Nichelle is progressing well towards her goals.   Patient prognosis is Fair.     Patient will continue to benefit from skilled outpatient physical therapy to address the deficits listed in the problem list box on initial evaluation, provide pt/family education and to maximize pt's level of independence in the home and community environment.     Patient's spiritual, cultural and educational needs considered and pt agreeable to plan of care and goals.     Anticipated barriers to physical therapy: dementia, dependent on transportation      Goals: Short Term Goals:  4 weeks (progressing, not met)  1.Report decreased    low back    pain  <   / =  5  /10 at its worst  to increase tolerance for ADLs (progressing, not met)  2. Pt to increase B popliteal angle by > or = 10 degrees in order to improve flexibility and posture.   3. Increased R LE strength by 1/3 muscle grade in all deficient planes to increase tolerance for ADLs. (Met)  4. Increased L LE strength by 1/3 muscle grade in all deficient planes to increase tolerance for ADLs. (Met)  5. Pt will report centralization of symptoms to low back only for increased tolerance for ADLs.  (progressing, not met)  6. Pt to tolerate HEP to improve ROM and independence with ADL's (met)  7. Pt will demonstrate understanding of proper body mechanics for lifting and daily chores.     Long Term Goals: 8 weeks   1.Report decreased    low back    pain  <   / =  3  /10  to increase tolerance for ADLs (progressing, not met)  2.Pt to increase B popliteal angle  by > or = 20 degrees in order to improve flexibility and posture. (progressing, not met)  3.Increased R LE strength by 1 muscle grade in all deficient planes to increase tolerance for ADLs. (Partially met)  4. Increased L LE strength by 1 muscle grade in all deficient planes to increase tolerance for ADLes.  (Partially met)  5.Pt will perform SLS at least 10 sec ea for increased safety ambulating on uneven surfaces.  6. Pt to be Independent with HEP to improve ROM and independence with ADL's (met)  7. Pt to demonstrate negative Bridge Test in order to show improved core strength for lumbar stabilization.     Plan     Continue per POC, progressing as appropriate to achieve stated goals.  Progress core and LE strengthening as tolerated for improved functional mobility.    Continue with: Plan of care Certification: 4/24/2024 to 5/24/24.     Outpatient Physical Therapy 1-2 times weekly for 4 more weeks to include the following interventions: Electrical Stimulation  , Gait Training, Manual Therapy, Moist Heat/ Ice, Neuromuscular Re-ed, Patient Education, Self Care, Therapeutic Activities, Therapeutic Exercise, and Ultrasound, and dry needling.     Marlyn Ozuna, PT

## 2024-05-01 ENCOUNTER — CLINICAL SUPPORT (OUTPATIENT)
Dept: REHABILITATION | Facility: HOSPITAL | Age: 69
End: 2024-05-01
Payer: MEDICARE

## 2024-05-01 DIAGNOSIS — R26.89 DECREASED FUNCTIONAL MOBILITY: ICD-10-CM

## 2024-05-01 DIAGNOSIS — M25.60 DECREASED RANGE OF MOTION: Primary | ICD-10-CM

## 2024-05-01 DIAGNOSIS — M62.81 MUSCLE WEAKNESS: ICD-10-CM

## 2024-05-01 PROCEDURE — 97530 THERAPEUTIC ACTIVITIES: CPT | Mod: PN

## 2024-05-01 PROCEDURE — 97110 THERAPEUTIC EXERCISES: CPT | Mod: PN

## 2024-05-01 PROCEDURE — 97112 NEUROMUSCULAR REEDUCATION: CPT | Mod: PN

## 2024-05-07 ENCOUNTER — HOSPITAL ENCOUNTER (OUTPATIENT)
Facility: HOSPITAL | Age: 69
Discharge: HOME | End: 2024-05-07
Attending: ANESTHESIOLOGY | Admitting: ANESTHESIOLOGY
Payer: MEDICARE

## 2024-05-07 DIAGNOSIS — M54.16 LUMBAR RADICULITIS: ICD-10-CM

## 2024-05-07 PROCEDURE — 63600175 PHARM REV CODE 636 W HCPCS: Performed by: ANESTHESIOLOGY

## 2024-05-07 PROCEDURE — 62323 NJX INTERLAMINAR LMBR/SAC: CPT | Mod: ,,, | Performed by: ANESTHESIOLOGY

## 2024-05-07 PROCEDURE — A9579 GAD-BASE MR CONTRAST NOS,1ML: HCPCS | Performed by: ANESTHESIOLOGY

## 2024-05-07 PROCEDURE — 25000003 PHARM REV CODE 250: Performed by: ANESTHESIOLOGY

## 2024-05-07 PROCEDURE — 25500020 PHARM REV CODE 255: Performed by: ANESTHESIOLOGY

## 2024-05-07 PROCEDURE — A4216 STERILE WATER/SALINE, 10 ML: HCPCS | Performed by: ANESTHESIOLOGY

## 2024-05-07 PROCEDURE — 62323 NJX INTERLAMINAR LMBR/SAC: CPT | Performed by: ANESTHESIOLOGY

## 2024-05-07 RX ORDER — ONDANSETRON HYDROCHLORIDE 2 MG/ML
INJECTION, SOLUTION INTRAVENOUS
Status: DISCONTINUED | OUTPATIENT
Start: 2024-05-07 | End: 2024-05-07 | Stop reason: HOSPADM

## 2024-05-07 RX ORDER — LIDOCAINE HYDROCHLORIDE 10 MG/ML
1 INJECTION, SOLUTION EPIDURAL; INFILTRATION; INTRACAUDAL; PERINEURAL ONCE
Status: ACTIVE | OUTPATIENT
Start: 2024-05-07

## 2024-05-07 RX ORDER — DEXAMETHASONE SODIUM PHOSPHATE 10 MG/ML
INJECTION INTRAMUSCULAR; INTRAVENOUS
Status: DISCONTINUED | OUTPATIENT
Start: 2024-05-07 | End: 2024-05-07 | Stop reason: HOSPADM

## 2024-05-07 RX ORDER — SODIUM CHLORIDE, SODIUM LACTATE, POTASSIUM CHLORIDE, CALCIUM CHLORIDE 600; 310; 30; 20 MG/100ML; MG/100ML; MG/100ML; MG/100ML
INJECTION, SOLUTION INTRAVENOUS CONTINUOUS
Status: ACTIVE | OUTPATIENT
Start: 2024-05-07

## 2024-05-07 RX ORDER — FENTANYL CITRATE 50 UG/ML
INJECTION, SOLUTION INTRAMUSCULAR; INTRAVENOUS
Status: DISCONTINUED | OUTPATIENT
Start: 2024-05-07 | End: 2024-05-07 | Stop reason: HOSPADM

## 2024-05-07 RX ORDER — MIDAZOLAM HYDROCHLORIDE 1 MG/ML
INJECTION INTRAMUSCULAR; INTRAVENOUS
Status: DISCONTINUED | OUTPATIENT
Start: 2024-05-07 | End: 2024-05-07 | Stop reason: HOSPADM

## 2024-05-07 RX ORDER — LIDOCAINE HYDROCHLORIDE 10 MG/ML
INJECTION, SOLUTION EPIDURAL; INFILTRATION; INTRACAUDAL; PERINEURAL
Status: DISCONTINUED | OUTPATIENT
Start: 2024-05-07 | End: 2024-05-07 | Stop reason: HOSPADM

## 2024-05-07 RX ORDER — SODIUM CHLORIDE 9 MG/ML
INJECTION, SOLUTION INTRAMUSCULAR; INTRAVENOUS; SUBCUTANEOUS
Status: DISCONTINUED | OUTPATIENT
Start: 2024-05-07 | End: 2024-05-07 | Stop reason: HOSPADM

## 2024-05-07 RX ADMIN — SODIUM CHLORIDE, POTASSIUM CHLORIDE, SODIUM LACTATE AND CALCIUM CHLORIDE: 600; 310; 30; 20 INJECTION, SOLUTION INTRAVENOUS at 11:05

## 2024-05-07 NOTE — PLAN OF CARE
Discharge instructions given to pt/, verbalized understanding.  Tolerating PO fluids.  IV removed.  Pain 5/10.  Ambulating out to   per RN in  no distress.

## 2024-05-07 NOTE — OP NOTE
PROCEDURE DATE: 5/7/2024    Procedure:   Interlaminar epidural steroid injection at L4-5 under fluoroscopic guidance.    Diagnosis: lUMBAR radiculitis  pOSTOP DIAGNOSIS: sAME    Physician: Shreyas Christopher M.D.    Medications injected:10 mg dexamethasone with 4 ml of preservative free NaCl    Local anesthetic injected:    Lidocaine 1% 2 ml total    Sedation Medications: RN IV sedation    Estimated blood loss:  None    Complications:  None    Technique:  Time-out taken to identify patient and procedure prior to starting the procedure.  With the patient laying in a prone position, the area was prepped and draped in the usual sterile fashion using ChloraPrep and a fenestrated drape.  After determining the target level with an AP fluoroscopic view, local anesthetic was given using a 25-gauge 1.5 inch needle by raising a wheal and then infiltrating toward the interlaminar entry space.  A 3.5inch 20-gauge Touhy needle was introduced under AP fluoroscopic guidance to the interlaminar space of L4-5. Once the trajectory was established, the needle was visualized in the lateral view and advanced using loss of resistance technique. Once in the desired position, 1ml contrast was injected to confirm placement and there was no vascular uptake nor intrathecal spread.  The medication was then injected slowly. The patient tolerated the procedure well.      The patient was monitored after the procedure.   They were given post-procedure and discharge instructions to follow at home.  The patient was discharged in a stable condition.

## 2024-05-07 NOTE — DISCHARGE SUMMARY
[facility] - Periop Services  Discharge Note  Short Stay    Procedure(s) (LRB):  Injection-steroid-epidural-lumbar (N/A)      OUTCOME: Condition has improved and patient is now ready for discharge.    DISPOSITION: Home or Self Care    FINAL DIAGNOSIS:  <principal problem not specified>    FOLLOWUP: In clinic    DISCHARGE INSTRUCTIONS:    Discharge Procedure Orders   Notify your health care provider if you experience any of the following:  temperature >100.4     Notify your health care provider if you experience any of the following:  severe uncontrolled pain     Notify your health care provider if you experience any of the following:  redness, tenderness, or signs of infection (pain, swelling, redness, odor or green/yellow discharge around incision site)     Activity as tolerated        TIME SPENT ON DISCHARGE:   30 minutes

## 2024-05-08 VITALS
HEART RATE: 88 BPM | RESPIRATION RATE: 18 BRPM | DIASTOLIC BLOOD PRESSURE: 75 MMHG | OXYGEN SATURATION: 99 % | SYSTOLIC BLOOD PRESSURE: 157 MMHG | TEMPERATURE: 98 F

## 2024-05-13 NOTE — PROGRESS NOTES
"  [facility]   Physical Therapy Treatment Note      [id number]    Therapy Diagnosis:   Encounter Diagnoses   Name Primary?    Decreased range of motion Yes    Muscle weakness     Decreased functional mobility          Physician: MONAE Reed MD    Visit Date: 5/14/2024    Physician Orders: PT Eval and Treat   Medical Diagnosis from Referral:   M54.50 (ICD-10-CM) - Low back pain, unspecified   M54.6 (ICD-10-CM) - Pain in thoracic spine      Evaluation Date: 12/31/2024  Authorization Period Expiration: 2/25/25  Plan of Care Expiration: 5/24/24 (reassessed on 4/24/24)  Visit # / Visits authorized: 14/ 20 +evaluation  FOTO Due visit 5=score 37 (3/2/2024) goal: 37 by visit 13  FOTO Due visit 10= 37 (4/8/24); goal met and FOTO d/c       Time In: 10:35   Time Out: 11:15  Total Time: 40 minutes  Total Billable Time: 40 minutes    Precautions: Standard, dementia    PTA Visit #: 2/5       Subjective     Patient reports: "Mornings are hard."  Pt states she is stiffer in the mornings.    She was compliant with home exercise program twice daily  Response to previous treatment: soreness which is resolved  Functional change: able to stand from chair without UE support     Pain: 8/10  Location: low back    Objective        Treatment     Nichelle received the treatments listed below:      Nichelle received therapeutic exercises to develop ROM and flexibility for 17 minutes including:  DKTC 3x20 sec  wig wags with ball between knees x1 min  LTR 3x20 sec  NP 2* LE trembling-Supine HS stretch 6x10 seconds  Hamstring stretch, seated 3 x 15s each  NP HS curls, seated, red band x 10  DF with GTB x20  LAQ 2x10, 4#     neuromuscular re-education activities to improve muscle activation and control for 15 minutes. The following activities were included:  TA sets +march, yellow loop x 20  Bridges, yellow loop x20-manual cues for glute activation  Side lying clams 2x10 Y sport loop  SLR " "2x10  ea  NP SL hip abd x 15-manual cues to maintain core bracing  NP-Side lying hip adduction x 10-bolster under top leg for comfort  SLS with airex 2 x 30s each  Tandem balance on foam 2 x 30s each  NP Seated TA set + hip add Pilates x15, 5 sec hold-cues for proper holds    therapeutic activities to improve functional performance for 8 minutes, including:  Sit to stand from 16" chair 2x10, 10# KB  NP Shuttle + TA set 2.5 bands 2x10  Hip abd walk Y loop      Patient Education and Home Exercises       Education provided:   - -perform exercises in pain free ROM   Instructed pt to wear shoes for therapy that allow ankle mobility for balance and strength training.     Written Home Exercises Provided:  Instructed patient to continue current home exercise program.    Exercises were reviewed and Nichelle was able to demonstrate them prior to the end of the session.  Nichelle demonstrated fair  understanding of the education provided. See Electronic Medical Record under Patient Instructions for exercises provided during therapy sessions    Assessment     Pt continues to improve with tolerance for increased functional challenges (sit to stand with weight).  She continues to present with report of severe pain, but able to perform all exercises/activities without exacerbation of symptoms.  She will continue to benefit from PT for improved function for ADLs.    Nichelle is progressing well towards her goals.   Patient prognosis is Fair.     Patient will continue to benefit from skilled outpatient physical therapy to address the deficits listed in the problem list box on initial evaluation, provide pt/family education and to maximize pt's level of independence in the home and community environment.     Patient's spiritual, cultural and educational needs considered and pt agreeable to plan of care and goals.     Anticipated barriers to physical therapy: dementia, dependent on transportation      Goals: Short Term Goals:  4 weeks "   1.Report decreased    low back    pain  <   / =  5  /10 at its worst  to increase tolerance for ADLs (progressing, not met)  2. Pt to increase B popliteal angle by > or = 10 degrees in order to improve flexibility and posture.   3. Increased R LE strength by 1/3 muscle grade in all deficient planes to increase tolerance for ADLs. (Met)  4. Increased L LE strength by 1/3 muscle grade in all deficient planes to increase tolerance for ADLs. (Met)  5. Pt will report centralization of symptoms to low back only for increased tolerance for ADLs.  (progressing, not met)  6. Pt to tolerate HEP to improve ROM and independence with ADL's (met)  7. Pt will demonstrate understanding of proper body mechanics for lifting and daily chores.     Long Term Goals: 8 weeks   1.Report decreased    low back    pain  <   / =  3  /10  to increase tolerance for ADLs (progressing, not met)  2.Pt to increase B popliteal angle by > or = 20 degrees in order to improve flexibility and posture. (progressing, not met)  3.Increased R LE strength by 1 muscle grade in all deficient planes to increase tolerance for ADLs. (Partially met)  4. Increased L LE strength by 1 muscle grade in all deficient planes to increase tolerance for ADLes.  (Partially met)  5.Pt will perform SLS at least 10 sec ea for increased safety ambulating on uneven surfaces.  6. Pt to be Independent with HEP to improve ROM and independence with ADL's (met)  7. Pt to demonstrate negative Bridge Test in order to show improved core strength for lumbar stabilization.     Plan     Continue per POC, progressing as appropriate to achieve stated goals.  Progress core and LE strengthening as tolerated for improved functional mobility.    Continue with: Plan of care Certification: 3/4/2024 to 5/3/24.     Outpatient Physical Therapy 2 times weekly for 8 weeks to include the following interventions: Electrical Stimulation  , Gait Training, Manual Therapy, Moist Heat/ Ice, Neuromuscular  Re-ed, Patient Education, Self Care, Therapeutic Activities, Therapeutic Exercise, and Ultrasound, and dry needling.     Marlyn Ozuna, PT           [facility]   Physical Therapy Treatment Note      Name: Nichelle Barone  Clinic Number: [id number]    Therapy Diagnosis:   Encounter Diagnoses   Name Primary?    Decreased range of motion Yes    Muscle weakness     Decreased functional mobility                Physician: MONAE Reed MD    Visit Date: 5/14/2024    Physician Orders: PT Eval and Treat   Medical Diagnosis from Referral:   M54.50 (ICD-10-CM) - Low back pain, unspecified   M54.6 (ICD-10-CM) - Pain in thoracic spine      Evaluation Date: 12/31/2024  Authorization Period Expiration: 2/25/25  Plan of Care Expiration: 5/3/24 (reassessed on 4/8/24)  Visit # / Visits authorized: 14/ 20 +evaluation  FOTO Due visit 5=score 37 (3/2/2024) goal: 37 by visit 13  FOTO Due visit 10= 37 (4/8/24); goal met and FOTO d/c       Time In: 1:00   Time Out: 1:45  Total Time: 45 minutes  Total Billable Time: 45 minutes    Precautions: Standard, dementia    PTA Visit #: 2/5       Subjective     Patient reports: she did her exercises this morning.  She is scheduled to see neurosurgeon on Monday, 4/29/24.    She was compliant with home exercise program twice daily  Response to previous treatment: soreness which is resolved  Functional change: able to stand from chair without UE support     Pain: 8/10  Location: neck    Objective        Not performed this date:  Lumbar Range of Motion:    % Pain   Flexion 100   none        Extension 100   none        Left Side Bending 100 no      Right Side Bending 90 no        Left rotation   50 no        Right Rotation   75 no             Lower Extremity Strength  Right LE  Left LE    Knee extension: 4-/5 Knee extension: 4/5   Knee flexion: 4+/5 Knee flexion: 5/5   Hip flexion: 4/5 Hip flexion: 4/5   Hip extension:  3+/5  Hip extension: 3+/5    Hip abduction: 4-/5 Hip  "abduction: 3+/5   Hip adduction: 4-/5 Hip adduction 4-/5   Ankle dorsiflexion: 4-/5 Ankle dorsiflexion: 4/5   Ankle plantarflexion: 3/5 Ankle plantarflexion: 3/5       Flexibility:    Popliteal Angle: R = -22 degrees ; L = -20 degrees    SLS: 30 sec B    Treatment     Nichelle received the treatments listed below:      Nichelle received therapeutic exercises to develop ROM and flexibility for 25 minutes including:  Reassessment  DKTC 3x20 sec  wig wags with ball between knees x1 min  LTR 3x20 sec  NP 2* LE trembling-Supine HS stretch 6x10 seconds  Hamstring stretch, seated 3 x 15s each  NP HS curls, seated, red band x 10  DF with red band 2x10  LAQ 2x10, 2#    May add: TA progressions     neuromuscular re-education activities to improve muscle activation and control for 20 minutes. The following activities were included:  TA sets + posterior pelvic tilt + hip adduction with ball x 15  TA sets +march, yellow loop x 20  Bridges, yellow loop x20-manual cues for glute activation  Side lying clams 2x10 Y sport loop  SLR x 10  ea  SL hip abd x 15-manual cues to maintain core bracing  NP-Side lying hip adduction x 10-bolster under top leg for comfort  Diaphragmatic breathing x 2 minutes supine  SLS with airex 2 x 30s each  NP_Tandem balance on foam 2 x 30s each  NP Seated TA set + hip add Pilates x15, 5 sec hold-cues for proper holds    Not performed today due to time: therapeutic activities to improve functional performance for 00 minutes, including:  Sit to stand from 16" chair 2x10 (cues for weight shift and glute activation)  Shuttle + TA set 1.5 bands 2x10  Logrolling for back protection      Patient Education and Home Exercises       Education provided:   - -perform exercises in pain free ROM   Instructed pt to wear shoes for therapy that allow ankle mobility for balance and strength training.     Written Home Exercises Provided:  Instructed patient to continue current home exercise program.    Exercises were reviewed and " Nichelle was able to demonstrate them prior to the end of the session.  Nichelle demonstrated fair  understanding of the education provided. See Electronic Medical Record under Patient Instructions for exercises provided during therapy sessions    Assessment     Pt reassessed today.  She has improved with increased lumbar ROM.  She continues to have some weakness to LEs.  She will follow up with neurosurgeon on Monday.  She will continue to benefit from PT for improved core and LE strength and stabilization for increased ease with functional mobility.    Nichelle is progressing well towards her goals.   Patient prognosis is Fair.     Patient will continue to benefit from skilled outpatient physical therapy to address the deficits listed in the problem list box on initial evaluation, provide pt/family education and to maximize pt's level of independence in the home and community environment.     Patient's spiritual, cultural and educational needs considered and pt agreeable to plan of care and goals.     Anticipated barriers to physical therapy: dementia, dependent on transportation      Goals: Short Term Goals:  4 weeks (progressing, not met)  1.Report decreased    low back    pain  <   / =  5  /10 at its worst  to increase tolerance for ADLs (progressing, not met)  2. Pt to increase B popliteal angle by > or = 10 degrees in order to improve flexibility and posture.   3. Increased R LE strength by 1/3 muscle grade in all deficient planes to increase tolerance for ADLs. (Met)  4. Increased L LE strength by 1/3 muscle grade in all deficient planes to increase tolerance for ADLs. (Met)  5. Pt will report centralization of symptoms to low back only for increased tolerance for ADLs.  (progressing, not met)  6. Pt to tolerate HEP to improve ROM and independence with ADL's (met)  7. Pt will demonstrate understanding of proper body mechanics for lifting and daily chores.     Long Term Goals: 8 weeks   1.Report decreased    low back     pain  <   / =  3  /10  to increase tolerance for ADLs (progressing, not met)  2.Pt to increase B popliteal angle by > or = 20 degrees in order to improve flexibility and posture. (progressing, not met)  3.Increased R LE strength by 1 muscle grade in all deficient planes to increase tolerance for ADLs. (Partially met)  4. Increased L LE strength by 1 muscle grade in all deficient planes to increase tolerance for ADLes.  (Partially met)  5.Pt will perform SLS at least 10 sec ea for increased safety ambulating on uneven surfaces.  6. Pt to be Independent with HEP to improve ROM and independence with ADL's (met)  7. Pt to demonstrate negative Bridge Test in order to show improved core strength for lumbar stabilization.     Plan     Continue per POC, progressing as appropriate to achieve stated goals.  Progress core and LE strengthening as tolerated for improved functional mobility.    Continue with: Plan of care Certification: 4/24/2024 to 5/24/24.     Outpatient Physical Therapy 1-2 times weekly for 4 more weeks to include the following interventions: Electrical Stimulation  , Gait Training, Manual Therapy, Moist Heat/ Ice, Neuromuscular Re-ed, Patient Education, Self Care, Therapeutic Activities, Therapeutic Exercise, and Ultrasound, and dry needling.     Marlyn Ozuna, PT

## 2024-05-14 ENCOUNTER — CLINICAL SUPPORT (OUTPATIENT)
Dept: REHABILITATION | Facility: HOSPITAL | Age: 69
End: 2024-05-14
Payer: MEDICARE

## 2024-05-14 DIAGNOSIS — M25.60 DECREASED RANGE OF MOTION: Primary | ICD-10-CM

## 2024-05-14 DIAGNOSIS — M62.81 MUSCLE WEAKNESS: ICD-10-CM

## 2024-05-14 DIAGNOSIS — R26.89 DECREASED FUNCTIONAL MOBILITY: ICD-10-CM

## 2024-05-14 PROCEDURE — 97110 THERAPEUTIC EXERCISES: CPT | Mod: PN

## 2024-05-14 PROCEDURE — 97530 THERAPEUTIC ACTIVITIES: CPT | Mod: PN

## 2024-05-14 PROCEDURE — 97112 NEUROMUSCULAR REEDUCATION: CPT | Mod: PN

## 2024-05-16 ENCOUNTER — CLINICAL SUPPORT (OUTPATIENT)
Dept: REHABILITATION | Facility: HOSPITAL | Age: 69
End: 2024-05-16
Payer: MEDICARE

## 2024-05-16 DIAGNOSIS — R26.89 DECREASED FUNCTIONAL MOBILITY: ICD-10-CM

## 2024-05-16 DIAGNOSIS — M62.81 MUSCLE WEAKNESS: ICD-10-CM

## 2024-05-16 DIAGNOSIS — M25.60 DECREASED RANGE OF MOTION: Primary | ICD-10-CM

## 2024-05-16 PROCEDURE — 97112 NEUROMUSCULAR REEDUCATION: CPT | Mod: PN,CQ

## 2024-05-16 PROCEDURE — 97530 THERAPEUTIC ACTIVITIES: CPT | Mod: PN,CQ

## 2024-05-16 PROCEDURE — 97110 THERAPEUTIC EXERCISES: CPT | Mod: KX,PN,CQ

## 2024-05-16 NOTE — PROGRESS NOTES
"  [facility]   Physical Therapy Treatment Note      [id number]    Therapy Diagnosis:   Encounter Diagnoses   Name Primary?    Decreased range of motion Yes    Muscle weakness     Decreased functional mobility        Physician: MONAE Reed MD    Visit Date: 5/16/2024    Physician Orders: PT Eval and Treat   Medical Diagnosis from Referral:   M54.50 (ICD-10-CM) - Low back pain, unspecified   M54.6 (ICD-10-CM) - Pain in thoracic spine      Evaluation Date: 12/31/2024  Authorization Period Expiration: 2/25/25  Plan of Care Expiration: 5/24/24 (reassessed on 4/24/24)  Visit # / Visits authorized: 17/ 20 +evaluation  FOTO Due visit 5=score 37 (3/2/2024) goal: 37 by visit 13  FOTO Due visit 10= 37 (4/8/24); goal met and FOTO d/c    Time In: 1522   Time Out: 1605   Total Time: 43 minutes  Total Billable Time: 43 minutes    Precautions: Standard, dementia    PTA Visit #: 1/5       Subjective     Patient reports: "Pain has been been bad." 8/10 left>right LB. Didn't feel improvement with injection a couple of weeks ago. Stiffness in the morning which didn't help.  helps her use home TENS unit.     She was mostly compliant with home exercise program most days, probably 5-6 times per week.   Response to previous treatment: soreness which is resolved  Functional change: able to stand from chair without UE support     Pain: 8/10  Location: low back    Objective        Treatment     Nichelle received the treatments listed below:      Nichelle received therapeutic exercises to develop ROM and flexibility for 19 minutes including:  DKTC 3x20 sec  Wig wags with ball between knees x1 min  LTR 3x20 sec  NP 2* LE trembling-Supine HS stretch 6x10 seconds  Hamstring stretch, seated 3 x 15s each  NP HS curls, seated, red band x 10  DF with GTB x20  NP-LAQ 2x10, 4#     neuromuscular re-education activities to improve muscle activation and control for 16 minutes. The following " "activities were included:  TA sets +march, yellow loop x 20  Bridges, yellow loop x20-manual cues for glute activation  Side lying clams 2x10 Y sport loop  SLR 2x10 ea  SL hip abd x 15-manual cues to maintain core bracing  NP-Side lying hip adduction x 10-bolster under top leg for comfort  SLS with airex 2 x 30s each  Tandem balance on foam 2 x 30s each  NP Seated TA set + hip add Pilates x15, 5 sec hold-cues for proper holds    therapeutic activities to improve functional performance for 8 minutes, including:  Sit to stand from 16" chair 2x10, 10# KB  NP Shuttle + TA set 2.5 bands 2x10  Hip abd walk Y loop x 2 laps each way      Patient Education and Home Exercises       Education provided:   - -perform exercises in pain free ROM   Instructed pt to wear shoes for therapy that allow ankle mobility for balance and strength training.     Written Home Exercises Provided:  Instructed patient to continue current home exercise program.    Exercises were reviewed and Nichelle was able to demonstrate them prior to the end of the session.  Nichelle demonstrated fair  understanding of the education provided. See Electronic Medical Record under Patient Instructions for exercises provided during therapy sessions    Assessment     Continued severe pain reported. Improving performance with balance training with decreasing frequency of UE support. No pain exacerbation this session. She will continue to benefit from PT for improved function for ADLs.    Nichelle is progressing well towards her goals.   Patient prognosis is Fair.     Patient will continue to benefit from skilled outpatient physical therapy to address the deficits listed in the problem list box on initial evaluation, provide pt/family education and to maximize pt's level of independence in the home and community environment.     Patient's spiritual, cultural and educational needs considered and pt agreeable to plan of care and goals.     Anticipated barriers to physical " therapy: dementia, dependent on transportation      Goals: Short Term Goals:  4 weeks   1.Report decreased    low back    pain  <   / =  5  /10 at its worst  to increase tolerance for ADLs (progressing, not met)  2. Pt to increase B popliteal angle by > or = 10 degrees in order to improve flexibility and posture.   3. Increased R LE strength by 1/3 muscle grade in all deficient planes to increase tolerance for ADLs. (Met)  4. Increased L LE strength by 1/3 muscle grade in all deficient planes to increase tolerance for ADLs. (Met)  5. Pt will report centralization of symptoms to low back only for increased tolerance for ADLs.  (progressing, not met)  6. Pt to tolerate HEP to improve ROM and independence with ADL's (met)  7. Pt will demonstrate understanding of proper body mechanics for lifting and daily chores.     Long Term Goals: 8 weeks   1.Report decreased    low back    pain  <   / =  3  /10  to increase tolerance for ADLs (progressing, not met)  2.Pt to increase B popliteal angle by > or = 20 degrees in order to improve flexibility and posture. (progressing, not met)  3.Increased R LE strength by 1 muscle grade in all deficient planes to increase tolerance for ADLs. (Partially met)  4. Increased L LE strength by 1 muscle grade in all deficient planes to increase tolerance for ADLes.  (Partially met)  5.Pt will perform SLS at least 10 sec ea for increased safety ambulating on uneven surfaces.  6. Pt to be Independent with HEP to improve ROM and independence with ADL's (met)  7. Pt to demonstrate negative Bridge Test in order to show improved core strength for lumbar stabilization.     Plan     Continue per POC, progressing as appropriate to achieve stated goals.  Progress core and LE strengthening as tolerated for improved functional mobility.    Continue with: Plan of care Certification: 3/4/2024 to 5/3/24.     Outpatient Physical Therapy 2 times weekly for 8 weeks to include the following interventions:  Electrical Stimulation  , Gait Training, Manual Therapy, Moist Heat/ Ice, Neuromuscular Re-ed, Patient Education, Self Care, Therapeutic Activities, Therapeutic Exercise, and Ultrasound, and dry needling.     Vivi Louise PTA           [facility]   Physical Therapy Treatment Note      Name: Nichelle Barone  [facility] Number: [id number]    Therapy Diagnosis:   Encounter Diagnoses   Name Primary?    Decreased range of motion Yes    Muscle weakness     Decreased functional mobility                  Physician: MONAE Reed MD    Visit Date: 5/16/2024    Physician Orders: PT Eval and Treat   Medical Diagnosis from Referral:   M54.50 (ICD-10-CM) - Low back pain, unspecified   M54.6 (ICD-10-CM) - Pain in thoracic spine      Evaluation Date: 12/31/2024  Authorization Period Expiration: 2/25/25  Plan of Care Expiration: 5/3/24 (reassessed on 4/8/24)  Visit # / Visits authorized: 14/ 20 +evaluation  FOTO Due visit 5=score 37 (3/2/2024) goal: 37 by visit 13  FOTO Due visit 10= 37 (4/8/24); goal met and FOTO d/c       Time In: 1:00   Time Out: 1:45  Total Time: 45 minutes  Total Billable Time: 45 minutes    Precautions: Standard, dementia    PTA Visit #: 2/5       Subjective     Patient reports: she did her exercises this morning.  She is scheduled to see neurosurgeon on Monday, 4/29/24.    She was compliant with home exercise program twice daily  Response to previous treatment: soreness which is resolved  Functional change: able to stand from chair without UE support     Pain: 8/10  Location: neck    Objective        Not performed this date:  Lumbar Range of Motion:    % Pain   Flexion 100   none        Extension 100   none        Left Side Bending 100 no      Right Side Bending 90 no        Left rotation   50 no        Right Rotation   75 no             Lower Extremity Strength  Right LE  Left LE    Knee extension: 4-/5 Knee extension: 4/5   Knee flexion: 4+/5 Knee flexion: 5/5   Hip flexion: 4/5 Hip  "flexion: 4/5   Hip extension:  3+/5  Hip extension: 3+/5    Hip abduction: 4-/5 Hip abduction: 3+/5   Hip adduction: 4-/5 Hip adduction 4-/5   Ankle dorsiflexion: 4-/5 Ankle dorsiflexion: 4/5   Ankle plantarflexion: 3/5 Ankle plantarflexion: 3/5       Flexibility:    Popliteal Angle: R = -22 degrees ; L = -20 degrees    SLS: 30 sec B    Treatment     Nichelle received the treatments listed below:      Nichelle received therapeutic exercises to develop ROM and flexibility for 25 minutes including:  Reassessment  DKTC 3x20 sec  wig wags with ball between knees x1 min  LTR 3x20 sec  NP 2* LE trembling-Supine HS stretch 6x10 seconds  Hamstring stretch, seated 3 x 15s each  NP HS curls, seated, red band x 10  DF with red band 2x10  LAQ 2x10, 2#    May add: TA progressions     neuromuscular re-education activities to improve muscle activation and control for 20 minutes. The following activities were included:  TA sets + posterior pelvic tilt + hip adduction with ball x 15  TA sets +march, yellow loop x 20  Bridges, yellow loop x20-manual cues for glute activation  Side lying clams 2x10 Y sport loop  SLR x 10  ea  SL hip abd x 15-manual cues to maintain core bracing  NP-Side lying hip adduction x 10-bolster under top leg for comfort  Diaphragmatic breathing x 2 minutes supine  SLS with airex 2 x 30s each  NP_Tandem balance on foam 2 x 30s each  NP Seated TA set + hip add Pilates x15, 5 sec hold-cues for proper holds    Not performed today due to time: therapeutic activities to improve functional performance for 00 minutes, including:  Sit to stand from 16" chair 2x10 (cues for weight shift and glute activation)  Shuttle + TA set 1.5 bands 2x10  Logrolling for back protection      Patient Education and Home Exercises       Education provided:   - -perform exercises in pain free ROM   Instructed pt to wear shoes for therapy that allow ankle mobility for balance and strength training.     Written Home Exercises Provided:  " Instructed patient to continue current home exercise program.    Exercises were reviewed and Nichelle was able to demonstrate them prior to the end of the session.  Nichelle demonstrated fair  understanding of the education provided. See Electronic Medical Record under Patient Instructions for exercises provided during therapy sessions    Assessment     Pt reassessed today.  She has improved with increased lumbar ROM.  She continues to have some weakness to LEs.  She will follow up with neurosurgeon on Monday.  She will continue to benefit from PT for improved core and LE strength and stabilization for increased ease with functional mobility.    Nichelle is progressing well towards her goals.   Patient prognosis is Fair.     Patient will continue to benefit from skilled outpatient physical therapy to address the deficits listed in the problem list box on initial evaluation, provide pt/family education and to maximize pt's level of independence in the home and community environment.     Patient's spiritual, cultural and educational needs considered and pt agreeable to plan of care and goals.     Anticipated barriers to physical therapy: dementia, dependent on transportation      Goals: Short Term Goals:  4 weeks (progressing, not met)  1.Report decreased    low back    pain  <   / =  5  /10 at its worst  to increase tolerance for ADLs (progressing, not met)  2. Pt to increase B popliteal angle by > or = 10 degrees in order to improve flexibility and posture.   3. Increased R LE strength by 1/3 muscle grade in all deficient planes to increase tolerance for ADLs. (Met)  4. Increased L LE strength by 1/3 muscle grade in all deficient planes to increase tolerance for ADLs. (Met)  5. Pt will report centralization of symptoms to low back only for increased tolerance for ADLs.  (progressing, not met)  6. Pt to tolerate HEP to improve ROM and independence with ADL's (met)  7. Pt will demonstrate understanding of proper body mechanics  for lifting and daily chores.     Long Term Goals: 8 weeks   1.Report decreased    low back    pain  <   / =  3  /10  to increase tolerance for ADLs (progressing, not met)  2.Pt to increase B popliteal angle by > or = 20 degrees in order to improve flexibility and posture. (progressing, not met)  3.Increased R LE strength by 1 muscle grade in all deficient planes to increase tolerance for ADLs. (Partially met)  4. Increased L LE strength by 1 muscle grade in all deficient planes to increase tolerance for ADLes.  (Partially met)  5.Pt will perform SLS at least 10 sec ea for increased safety ambulating on uneven surfaces.  6. Pt to be Independent with HEP to improve ROM and independence with ADL's (met)  7. Pt to demonstrate negative Bridge Test in order to show improved core strength for lumbar stabilization.     Plan     Continue per POC, progressing as appropriate to achieve stated goals.  Progress core and LE strengthening as tolerated for improved functional mobility.    Continue with: Plan of care Certification: 4/24/2024 to 5/24/24.     Outpatient Physical Therapy 1-2 times weekly for 4 more weeks to include the following interventions: Electrical Stimulation  , Gait Training, Manual Therapy, Moist Heat/ Ice, Neuromuscular Re-ed, Patient Education, Self Care, Therapeutic Activities, Therapeutic Exercise, and Ultrasound, and dry needling.     Vivi Louise, PTA

## 2024-05-17 ENCOUNTER — TELEPHONE (OUTPATIENT)
Dept: NEUROSURGERY | Facility: CLINIC | Age: 69
End: 2024-05-17
Payer: MEDICARE

## 2024-05-17 NOTE — TELEPHONE ENCOUNTER
----- Message from Ann-Marie Jones RN sent at 5/16/2024  5:11 PM CDT -----    ----- Message -----  From: Lefort, Stacey M  Sent: 5/16/2024   4:08 PM CDT  To: Ann-Marie Jones RN    Pt needs a callback at  [phone]

## 2024-05-17 NOTE — TELEPHONE ENCOUNTER
Called pt back and spoke to pt spouse. Pt spouse was calling to report that the pt pain has not been relieved since the STELLA performed by Dr. Christopher on 5/7/24, and is asking what should happen next for the pt to get relief. Explained to pt that these injections can take between 3-4 weeks to start to see effect/relief, if pt has not experienced diminishment of pain on 5/28/24 we would be glad to talk about next steps for pt. Asked that pt call us at the above date regarding what relief or lack thereof pt has experienced. Pt agreed with this and this nurse confirmed this offices number with pt spouse.

## 2024-05-21 ENCOUNTER — CLINICAL SUPPORT (OUTPATIENT)
Dept: REHABILITATION | Facility: HOSPITAL | Age: 69
End: 2024-05-21
Payer: MEDICARE

## 2024-05-21 DIAGNOSIS — M62.81 MUSCLE WEAKNESS: ICD-10-CM

## 2024-05-21 DIAGNOSIS — M25.60 DECREASED RANGE OF MOTION: Primary | ICD-10-CM

## 2024-05-21 DIAGNOSIS — R26.89 DECREASED FUNCTIONAL MOBILITY: ICD-10-CM

## 2024-05-21 PROCEDURE — 97112 NEUROMUSCULAR REEDUCATION: CPT | Mod: PN,CQ

## 2024-05-21 PROCEDURE — 97110 THERAPEUTIC EXERCISES: CPT | Mod: KX,PN,CQ

## 2024-05-21 PROCEDURE — 97530 THERAPEUTIC ACTIVITIES: CPT | Mod: PN,CQ

## 2024-05-21 NOTE — PROGRESS NOTES
[facility]   Physical Therapy Treatment Note      Name: [facility]helle [facility] Number: [id number]    Therapy Diagnosis:   Encounter Diagnoses   Name Primary?    Decreased range of motion Yes    Muscle weakness     Decreased functional mobility        Physician: MONAE Reed MD    Visit Date: 5/21/2024    Physician Orders: PT Eval and Treat   Medical Diagnosis from Referral:   M54.50 (ICD-10-CM) - Low back pain, unspecified   M54.6 (ICD-10-CM) - Pain in thoracic spine      Evaluation Date: 12/31/2024  Authorization Period Expiration: 2/25/25  Plan of Care Expiration: 5/24/24 (reassessed on 4/24/24)  Visit # / Visits authorized: 18/ 20 +evaluation  FOTO Due visit 5=score 37 (3/2/2024) goal: 37 by visit 13  FOTO Due visit 10= 37 (4/8/24); goal met and FOTO d/c    Time In: 1304   Time Out: 1348   Total Time: 44 minutes  Total Billable Time: 44 minutes    Precautions: Standard, dementia    PTA Visit #: 2/5       Subjective     Patient reports: 8.5/10 bilateral low back and left neck. Did not perform home exercise program this morning, which usually helps. Still uses TENS unit and used it this morning. Has been able to do more of the kitchen work than before.     She was mostly compliant with home exercise program most days, probably 5-6 times per week.   Response to previous treatment: soreness which is resolved  Functional change: able to stand from chair without UE support. Has been able to do more of the kitchen work than before.     Pain: 8.5/10  Location: low back    Objective        Treatment     Nichelle received the treatments listed below:      Nichelle received therapeutic exercises to develop ROM and flexibility for 20 minutes including:  DKTC 3x20 sec  Wig wags with ball between knees x1 min  LTR 3x20 sec  NP 2* LE trembling-Supine HS stretch 6x10 seconds  NP-Hamstring stretch, seated 3 x 15s each  NP HS curls, seated, red band x 10  NP-DF with GTB x20  NP-LAQ 2x10, 4#    "  neuromuscular re-education activities to improve muscle activation and control for 16 minutes. The following activities were included:  TA sets +march, yellow loop x 20  Bridges, green loop x20-manual cues for glute activation  Side lying clams 2x10 Y sport loop  SLR 2x10 ea  SL hip abd x 15-manual cues to maintain core bracing  NP-Side lying hip adduction x 10-bolster under top leg for comfort  SLS with airex 2 x 30s each  Tandem balance on foam 2 x 30s each  NP Seated TA set + hip add Pilates x15, 5 sec hold-cues for proper holds    therapeutic activities to improve functional performance for 8 minutes, including:  Sit to stand from 16" chair x20, 10# KB-mild SOB last reps  March on foam 2 x 30s  NP Shuttle + TA set 2.5 bands 2x10  NP-Hip abd walk Y loop x 2 laps each way      Patient Education and Home Exercises       Education provided:   - -perform exercises in pain free ROM   Instructed pt to wear shoes for therapy that allow ankle mobility for balance and strength training.     Written Home Exercises Provided:  Instructed patient to continue current home exercise program.    Exercises were reviewed and Nichelle was able to demonstrate them prior to the end of the session.  Nichelle demonstrated fair  understanding of the education provided. See Electronic Medical Record under Patient Instructions for exercises provided during therapy sessions    Assessment     Continued high levels of pain but reports improving functional mobility. Good recall of home exercise program. Progressing with strengthening with good response. Continues to be challenged with SLR but form is improving. Improved endurance with side lying hip abduction.  Improving SLS on foam with decreased frequency of UE tapping support. No pain exacerbation this session. She will continue to benefit from PT for improved function for ADLs.    Nichelle is progressing well towards her goals.   Patient prognosis is Fair.     Patient will continue to benefit " from skilled outpatient physical therapy to address the deficits listed in the problem list box on initial evaluation, provide pt/family education and to maximize pt's level of independence in the home and community environment.     Patient's spiritual, cultural and educational needs considered and pt agreeable to plan of care and goals.     Anticipated barriers to physical therapy: dementia, dependent on transportation      Goals: Short Term Goals:  4 weeks   1.Report decreased    low back    pain  <   / =  5  /10 at its worst  to increase tolerance for ADLs (progressing, not met)  2. Pt to increase B popliteal angle by > or = 10 degrees in order to improve flexibility and posture.   3. Increased R LE strength by 1/3 muscle grade in all deficient planes to increase tolerance for ADLs. (Met)  4. Increased L LE strength by 1/3 muscle grade in all deficient planes to increase tolerance for ADLs. (Met)  5. Pt will report centralization of symptoms to low back only for increased tolerance for ADLs.  (progressing, not met)  6. Pt to tolerate HEP to improve ROM and independence with ADL's (met)  7. Pt will demonstrate understanding of proper body mechanics for lifting and daily chores.     Long Term Goals: 8 weeks   1.Report decreased    low back    pain  <   / =  3  /10  to increase tolerance for ADLs (progressing, not met)  2.Pt to increase B popliteal angle by > or = 20 degrees in order to improve flexibility and posture. (progressing, not met)  3.Increased R LE strength by 1 muscle grade in all deficient planes to increase tolerance for ADLs. (Partially met)  4. Increased L LE strength by 1 muscle grade in all deficient planes to increase tolerance for ADLes.  (Partially met)  5.Pt will perform SLS at least 10 sec ea for increased safety ambulating on uneven surfaces.  6. Pt to be Independent with HEP to improve ROM and independence with ADL's (met)  7. Pt to demonstrate negative Bridge Test in order to show improved  core strength for lumbar stabilization.     Plan     Continue per POC, progressing as appropriate to achieve stated goals.  Progress core and LE strengthening as tolerated for improved functional mobility.    Continue with: Plan of care Certification: 3/4/2024 to 5/3/24.     Outpatient Physical Therapy 2 times weekly for 8 weeks to include the following interventions: Electrical Stimulation  , Gait Training, Manual Therapy, Moist Heat/ Ice, Neuromuscular Re-ed, Patient Education, Self Care, Therapeutic Activities, Therapeutic Exercise, and Ultrasound, and dry needling.     iVvi Louise, PTA

## 2024-05-22 NOTE — PROGRESS NOTES
[facility]   Discharge Summary and Physical Therapy Treatment Note      Name: Nichelle Barone  [facility] Number: [id number]    Therapy Diagnosis:   Encounter Diagnoses   Name Primary?    Decreased range of motion Yes    Muscle weakness     Decreased functional mobility          Physician: MONAE Reed MD    Visit Date: 5/23/2024    Physician Orders: PT Eval and Treat   Medical Diagnosis from Referral:   M54.50 (ICD-10-CM) - Low back pain, unspecified   M54.6 (ICD-10-CM) - Pain in thoracic spine      Evaluation Date: 12/31/2024  Authorization Period Expiration: 2/25/25  Plan of Care Expiration: 5/24/24 (reassessed on 4/24/24)  Visit # / Visits authorized: 19/ 20 +evaluation  FOTO Due visit 5=score 37 (3/2/2024) goal: 37 by visit 13  FOTO Due visit 10= 37 (4/8/24); goal met and FOTO d/c    Time In: 1:53   Time Out: 2:45   Total Time: 50 minutes  Total Billable Time: 50 minutes    Precautions: Standard, dementia    PTA Visit #: 2/5       Subjective     Patient reports: her pain is still 8/10.  It is easier to go from sit to stand    She was mostly compliant with home exercise program most days, probably 5-6 times per week.   Response to previous treatment: soreness which is resolved  Functional change: able to stand from chair without UE support. Has been able to do more of the kitchen work than before.     Pain: 8/10  Location: low back    Objective      Lumbar Range of Motion:    % Pain   Flexion 100   none        Extension 100   none        Left Side Bending 100 no      Right Side Bending 90 no        Left rotation   50 no        Right Rotation   75 no             Lower Extremity Strength  Right LE  Left LE    Knee extension: 4-/5 Knee extension: 4/5   Knee flexion: 5/5 Knee flexion: 5/5   Hip flexion: 4/5 Hip flexion: 4/5   Hip extension:  3+/5  Hip extension: 3+/5    Hip abduction: 4-/5 Hip abduction: 3+/5   Hip adduction: 4-/5 Hip adduction 4-/5   Ankle dorsiflexion: 4+/5 Ankle  "dorsiflexion: 4+/5   Ankle plantarflexion: 3/5 Ankle plantarflexion: 3/5       Flexibility:    Popliteal Angle: R = -10 degrees ; L = -20 degrees    SLS: 30 sec B    Treatment     Nichelle received the treatments listed below:      Nichelle received therapeutic exercises to develop ROM and flexibility for 25 minutes including:  reassessment  DKTC 3x20 sec  Wig wags with ball between knees x1 min  LTR 3x20 sec  DF with GTB x20     neuromuscular re-education activities to improve muscle activation and control for 10 minutes. The following activities were included:  TA sets +march, yellow loop x 20  Bridges, green loop x20-manual cues for glute activation  Side lying clams 2x10 RTB  SLR 2x10 ea  SLS with airex 2 x 30s each  Tandem balance on foam 2 x 30s each    therapeutic activities to improve functional performance for 10 minutes, including:  Sit to stand from 16" chair x20, 10# KB-mild SOB last reps  Body mechanics for lifting orange ball floor to/from waist  Continue HEP and keep moving within tolerance      Patient Education and Home Exercises       Education provided:   - -perform exercises in pain free ROM   Instructed pt to wear shoes for therapy that allow ankle mobility for balance and strength training.     Written Home Exercises Provided:  Instructed patient to continue current home exercise program.    Exercises were reviewed and Nichelle was able to demonstrate them prior to the end of the session.  Nichelle demonstrated fair  understanding of the education provided. See Electronic Medical Record under Patient Instructions for exercises provided during therapy sessions    Assessment     Pt reassessed today.  She has plateaued with progress in PT.  She has improved with function for sit to stand and lifting since eval.  Pt continues to report severe pain to low back and will follow up with MD regarding continued pain.    Patient's spiritual, cultural and educational needs considered and pt agreeable to plan of care " and goals.     Anticipated barriers to physical therapy: dementia, dependent on transportation      Goals: Short Term Goals:  4 weeks   1.Report decreased    low back    pain  <   / =  5  /10 at its worst  to increase tolerance for ADLs (progressing, not met)  2. Pt to increase B popliteal angle by > or = 10 degrees in order to improve flexibility and posture.   3. Increased R LE strength by 1/3 muscle grade in all deficient planes to increase tolerance for ADLs. (Met)  4. Increased L LE strength by 1/3 muscle grade in all deficient planes to increase tolerance for ADLs. (Met)  5. Pt will report centralization of symptoms to low back only for increased tolerance for ADLs.  (progressing, not met)  6. Pt to tolerate HEP to improve ROM and independence with ADL's (met)  7. Pt will demonstrate understanding of proper body mechanics for lifting and daily chores.     Long Term Goals: 8 weeks   1.Report decreased    low back    pain  <   / =  3  /10  to increase tolerance for ADLs (progressing, not met)  2.Pt to increase B popliteal angle by > or = 20 degrees in order to improve flexibility and posture. (progressing, not met)  3.Increased R LE strength by 1 muscle grade in all deficient planes to increase tolerance for ADLs. (Partially met)  4. Increased L LE strength by 1 muscle grade in all deficient planes to increase tolerance for ADLes.  (Partially met)  5.Pt will perform SLS at least 10 sec ea for increased safety ambulating on uneven surfaces.  6. Pt to be Independent with HEP to improve ROM and independence with ADL's (met)  7. Pt to demonstrate negative Bridge Test in order to show improved core strength for lumbar stabilization.     Plan     Pt discharged from PT to continue to self manage with HEP.    Marlyn Ozuna, PT

## 2024-05-23 ENCOUNTER — CLINICAL SUPPORT (OUTPATIENT)
Dept: REHABILITATION | Facility: HOSPITAL | Age: 69
End: 2024-05-23
Payer: MEDICARE

## 2024-05-23 DIAGNOSIS — M62.81 MUSCLE WEAKNESS: ICD-10-CM

## 2024-05-23 DIAGNOSIS — R26.89 DECREASED FUNCTIONAL MOBILITY: ICD-10-CM

## 2024-05-23 DIAGNOSIS — M25.60 DECREASED RANGE OF MOTION: Primary | ICD-10-CM

## 2024-05-23 PROCEDURE — 97530 THERAPEUTIC ACTIVITIES: CPT | Mod: PN

## 2024-05-23 PROCEDURE — 97112 NEUROMUSCULAR REEDUCATION: CPT | Mod: PN

## 2024-05-23 PROCEDURE — 97110 THERAPEUTIC EXERCISES: CPT | Mod: PN

## 2024-05-23 NOTE — PLAN OF CARE
[facility]   Discharge Summary and Physical Therapy Treatment Note      Name: Nichelle Barone  [facility] Number: [id number]    Therapy Diagnosis:   Encounter Diagnoses   Name Primary?    Decreased range of motion Yes    Muscle weakness     Decreased functional mobility          Physician: MONAE Reed MD    Visit Date: 5/23/2024    Physician Orders: PT Eval and Treat   Medical Diagnosis from Referral:   M54.50 (ICD-10-CM) - Low back pain, unspecified   M54.6 (ICD-10-CM) - Pain in thoracic spine      Evaluation Date: 12/31/2024  Authorization Period Expiration: 2/25/25  Plan of Care Expiration: 5/24/24 (reassessed on 4/24/24)  Visit # / Visits authorized: 19/ 20 +evaluation  FOTO Due visit 5=score 37 (3/2/2024) goal: 37 by visit 13  FOTO Due visit 10= 37 (4/8/24); goal met and FOTO d/c    Time In: 1:53   Time Out: 2:45   Total Time: 50 minutes  Total Billable Time: 50 minutes    Precautions: Standard, dementia    PTA Visit #: 2/5       Subjective     Patient reports: her pain is still 8/10.  It is easier to go from sit to stand    She was mostly compliant with home exercise program most days, probably 5-6 times per week.   Response to previous treatment: soreness which is resolved  Functional change: able to stand from chair without UE support. Has been able to do more of the kitchen work than before.     Pain: 8/10  Location: low back    Objective      Lumbar Range of Motion:    % Pain   Flexion 100   none        Extension 100   none        Left Side Bending 100 no      Right Side Bending 90 no        Left rotation   50 no        Right Rotation   75 no             Lower Extremity Strength  Right LE  Left LE    Knee extension: 4-/5 Knee extension: 4/5   Knee flexion: 5/5 Knee flexion: 5/5   Hip flexion: 4/5 Hip flexion: 4/5   Hip extension:  3+/5  Hip extension: 3+/5    Hip abduction: 4-/5 Hip abduction: 3+/5   Hip adduction: 4-/5 Hip adduction 4-/5   Ankle dorsiflexion: 4+/5 Ankle  "dorsiflexion: 4+/5   Ankle plantarflexion: 3/5 Ankle plantarflexion: 3/5       Flexibility:    Popliteal Angle: R = -10 degrees ; L = -20 degrees    SLS: 30 sec B    Treatment     Nichelle received the treatments listed below:      Nichelle received therapeutic exercises to develop ROM and flexibility for 25 minutes including:  reassessment  DKTC 3x20 sec  Wig wags with ball between knees x1 min  LTR 3x20 sec  DF with GTB x20     neuromuscular re-education activities to improve muscle activation and control for 10 minutes. The following activities were included:  TA sets +march, yellow loop x 20  Bridges, green loop x20-manual cues for glute activation  Side lying clams 2x10 RTB  SLR 2x10 ea  SLS with airex 2 x 30s each  Tandem balance on foam 2 x 30s each    therapeutic activities to improve functional performance for 10 minutes, including:  Sit to stand from 16" chair x20, 10# KB-mild SOB last reps  Body mechanics for lifting orange ball floor to/from waist  Continue HEP and keep moving within tolerance      Patient Education and Home Exercises       Education provided:   - -perform exercises in pain free ROM   Instructed pt to wear shoes for therapy that allow ankle mobility for balance and strength training.     Written Home Exercises Provided:  Instructed patient to continue current home exercise program.    Exercises were reviewed and Nichelle was able to demonstrate them prior to the end of the session.  Nichelle demonstrated fair  understanding of the education provided. See Electronic Medical Record under Patient Instructions for exercises provided during therapy sessions    Assessment     Pt reassessed today.  She has plateaued with progress in PT.  She has improved with function for sit to stand and lifting since eval.  Pt continues to report severe pain to low back and will follow up with MD regarding continued pain.    Patient's spiritual, cultural and educational needs considered and pt agreeable to plan of care " and goals.     Anticipated barriers to physical therapy: dementia, dependent on transportation      Goals: Short Term Goals:  4 weeks   1.Report decreased    low back    pain  <   / =  5  /10 at its worst  to increase tolerance for ADLs (progressing, not met)  2. Pt to increase B popliteal angle by > or = 10 degrees in order to improve flexibility and posture.   3. Increased R LE strength by 1/3 muscle grade in all deficient planes to increase tolerance for ADLs. (Met)  4. Increased L LE strength by 1/3 muscle grade in all deficient planes to increase tolerance for ADLs. (Met)  5. Pt will report centralization of symptoms to low back only for increased tolerance for ADLs.  (progressing, not met)  6. Pt to tolerate HEP to improve ROM and independence with ADL's (met)  7. Pt will demonstrate understanding of proper body mechanics for lifting and daily chores.     Long Term Goals: 8 weeks   1.Report decreased    low back    pain  <   / =  3  /10  to increase tolerance for ADLs (progressing, not met)  2.Pt to increase B popliteal angle by > or = 20 degrees in order to improve flexibility and posture. (progressing, not met)  3.Increased R LE strength by 1 muscle grade in all deficient planes to increase tolerance for ADLs. (Partially met)  4. Increased L LE strength by 1 muscle grade in all deficient planes to increase tolerance for ADLes.  (Partially met)  5.Pt will perform SLS at least 10 sec ea for increased safety ambulating on uneven surfaces.  6. Pt to be Independent with HEP to improve ROM and independence with ADL's (met)  7. Pt to demonstrate negative Bridge Test in order to show improved core strength for lumbar stabilization.     Plan     Pt discharged from PT to continue to self manage with HEP.    Marlyn Ozuna, PT

## 2024-05-29 ENCOUNTER — TELEPHONE (OUTPATIENT)
Dept: NEUROSURGERY | Facility: CLINIC | Age: 69
End: 2024-05-29
Payer: MEDICARE

## 2024-05-29 NOTE — TELEPHONE ENCOUNTER
Pt spouse reports that pt's pain has not diminished since the injection, scheduled pt a f/u appt with Dr. Heller.

## 2024-05-29 NOTE — TELEPHONE ENCOUNTER
----- Message from Ann-Marie Jones RN sent at 5/29/2024  8:12 AM CDT -----    ----- Message -----  From: Ayana Becerra MA  Sent: 5/28/2024   4:42 PM CDT  To: Ann-Marie Jones RN      ----- Message -----  From: Lefort, Stacey M  Sent: 5/28/2024   3:48 PM CDT  To: Arron Valera Staff    Pt needs a callback at  [phone]   functional limitations in following categories/risk reduction/prevention/rehab potential/therapy frequency/predicted duration of therapy intervention/anticipated equipment needs at discharge/anticipated discharge recommendation

## 2024-06-10 ENCOUNTER — TELEPHONE (OUTPATIENT)
Dept: NEUROSURGERY | Facility: CLINIC | Age: 69
End: 2024-06-10
Payer: MEDICARE

## 2024-06-10 NOTE — TELEPHONE ENCOUNTER
----- Message from Ann-Marie Jones RN sent at 6/10/2024  2:47 PM CDT -----    ----- Message -----  From: Lefort, Stacey M  Sent: 6/10/2024   2:31 PM CDT  To: Arron Valera Staff    Pt is requesting a callback at  [phone]

## 2024-06-10 NOTE — TELEPHONE ENCOUNTER
Called pt back, pt spouse reports that when this office ordered a tens unit he was able to  the unit from [facility] in [place] without issue, but now that he is trying to obtain replacement pads when he calls the number to obtain more he is told that a new order is needed or he can pay out of pocket for them. This nurse informed pt he would reach out to the team responsible for dispensing and find out how this can be resolved, informed pt this nurse would call back when he has more info.

## 2024-06-13 ENCOUNTER — OFFICE VISIT (OUTPATIENT)
Dept: NEUROSURGERY | Facility: CLINIC | Age: 69
End: 2024-06-13
Payer: MEDICARE

## 2024-06-13 DIAGNOSIS — M50.30 DDD (DEGENERATIVE DISC DISEASE), CERVICAL: Primary | ICD-10-CM

## 2024-06-13 DIAGNOSIS — M48.061 SPINAL STENOSIS OF LUMBAR REGION, UNSPECIFIED WHETHER NEUROGENIC CLAUDICATION PRESENT: ICD-10-CM

## 2024-06-13 DIAGNOSIS — M54.16 LUMBAR RADICULOPATHY: ICD-10-CM

## 2024-06-13 DIAGNOSIS — M51.36 DDD (DEGENERATIVE DISC DISEASE), LUMBAR: ICD-10-CM

## 2024-06-13 PROCEDURE — 99213 OFFICE O/P EST LOW 20 MIN: CPT | Mod: S$PBB,,, | Performed by: NEUROLOGICAL SURGERY

## 2024-06-13 RX ORDER — METHOCARBAMOL 750 MG/1
750 TABLET, FILM COATED ORAL 3 TIMES DAILY
Qty: 180 TABLET | Refills: 0 | Status: SHIPPED | OUTPATIENT
Start: 2024-06-13 | End: 2024-08-12

## 2024-06-13 NOTE — TELEPHONE ENCOUNTER
Pt in office for appt, informed pt that this office has reached out to the department that tends to these kinds of supplies and that it should be addressed tomorrow. Will inform pt of that outcome.

## 2024-06-13 NOTE — PROGRESS NOTES
HPI 04/29/2024:  The patient is status post uneventful ACDF C5-7 for severe cervical stenosis with myelopathy 03/29/2023.  She returns today with worsening axial low back pain with radiation into bilateral hip, anterior thigh.  She notes her legs feel heavy with standing or walking.  She denies focal weakness saddle anesthesia.  She has chronic bowel and bladder dysfunction, reports no worsening.  She was seen outside pain management he was considering interventional procedures but requested neurosurgical evaluation prior to proceeding.  She continues to smoke at least a half pack per day.  She has a history of osteoporosis.     She denies neck pain, radicular symptoms, myelopathy symptoms     MRI lumbar spine 03/01/2024 reviewed in detail with the patient and .  Pertinent findings:  Grade 1 spondylolisthesis L4-5, moderate to severe canal and recess stenosis L4-5.  Effusions L4-5.    Interval history 06/13/2024:  She returns with ongoing symptoms as above.  Reports no improvement with L4-5 interlaminar STELLA.  She continues to smoke heavily.  Her osteoporosis remains untreated.  She reports previous symptom improvement with medial branch blocks     Exam:    Awake, alert, oriented to person place and time.    Cranial nerves 2-12 grossly intact.    Strength is graded 5/5 in all muscle groups.    Sensation is grossly intact throughout.    Gait and stance are normal  Right anterior cervical incision well healed     Analysis:  Although she would likely benefit from lumbar surgical intervention, she is not a candidate for elective spine surgery given her ongoing heavy smoking and untreated osteoporosis.  Furthermore, she is opposed to lumbar surgery.  She requests referral to pain management in [place] where she lives for a lumbar medial branch block; referral made.  Follow up as needed.    Diagnoses and all orders for this visit:    DDD (degenerative disc disease), cervical  -     Ambulatory referral/consult to  Pain Clinic; Future    DDD (degenerative disc disease), lumbar  -     Ambulatory referral/consult to Pain Clinic; Future    Spinal stenosis of lumbar region, unspecified whether neurogenic claudication present    Lumbar radiculopathy    Other orders  -     methocarbamoL (ROBAXIN) 750 MG Tab; Take 1 tablet (750 mg total) by mouth 3 (three) times daily.

## 2024-06-17 DIAGNOSIS — M51.36 DDD (DEGENERATIVE DISC DISEASE), LUMBAR: Primary | ICD-10-CM

## 2024-06-17 NOTE — TELEPHONE ENCOUNTER
Called and informed pt spouse that an order has been placed for replacement tens pads and he would just need to contact the person who provides them to pts and they should be able to provide replacement pads.

## 2024-07-09 ENCOUNTER — OFFICE VISIT (OUTPATIENT)
Dept: PAIN MEDICINE | Facility: CLINIC | Age: 69
End: 2024-07-09
Payer: MEDICARE

## 2024-07-09 ENCOUNTER — TELEPHONE (OUTPATIENT)
Dept: PAIN MEDICINE | Facility: CLINIC | Age: 69
End: 2024-07-09

## 2024-07-09 ENCOUNTER — HOSPITAL ENCOUNTER (OUTPATIENT)
Dept: RADIOLOGY | Facility: HOSPITAL | Age: 69
Discharge: HOME | End: 2024-07-09
Attending: ANESTHESIOLOGY
Payer: MEDICARE

## 2024-07-09 VITALS
HEART RATE: 88 BPM | BODY MASS INDEX: 22.04 KG/M2 | WEIGHT: 124.38 LBS | SYSTOLIC BLOOD PRESSURE: 148 MMHG | DIASTOLIC BLOOD PRESSURE: 93 MMHG | HEIGHT: 63 IN

## 2024-07-09 DIAGNOSIS — M47.816 LUMBAR SPONDYLOSIS: ICD-10-CM

## 2024-07-09 DIAGNOSIS — M54.16 LUMBAR RADICULOPATHY: Primary | ICD-10-CM

## 2024-07-09 DIAGNOSIS — M54.16 LUMBAR RADICULOPATHY: ICD-10-CM

## 2024-07-09 DIAGNOSIS — Z01.818 PRE-OP TESTING: Primary | ICD-10-CM

## 2024-07-09 PROCEDURE — 99214 OFFICE O/P EST MOD 30 MIN: CPT | Mod: PBBFAC,PO,25 | Performed by: ANESTHESIOLOGY

## 2024-07-09 PROCEDURE — 99204 OFFICE O/P NEW MOD 45 MIN: CPT | Mod: S$PBB,,, | Performed by: ANESTHESIOLOGY

## 2024-07-09 PROCEDURE — 72114 X-RAY EXAM L-S SPINE BENDING: CPT | Mod: 26,,, | Performed by: RADIOLOGY

## 2024-07-09 PROCEDURE — 99999 PR PBB SHADOW E&M-EST. PATIENT-LVL IV: CPT | Mod: PBBFAC,,, | Performed by: ANESTHESIOLOGY

## 2024-07-09 PROCEDURE — 72114 X-RAY EXAM L-S SPINE BENDING: CPT | Mod: TC,PO

## 2024-07-09 NOTE — H&P (VIEW-ONLY)
[facility] New Patient Evaluation      Referred by: Dr. Soto Heller    PCP:     CC:   Chief Complaint   Patient presents with    Mid-back Pain    Neck Pain          7/9/2024     9:25 AM   Last 3 PDI Scores   Pain Disability Index (PDI) 45         HPI:   Nichelle Barone is a 69 y.o. female patient who has a past medical history of Arthritis, Cancer, Coma, COPD (chronic obstructive pulmonary disease), Decreased circulation, Dementia with psychosis, Endometriosis, unspecified, Fracture, femur, Fracture, foot, Heel fracture, Hiatal hernia with GERD, Hypertension, Pulmonary embolism, Respiratory distress, Rheumatoid arthritis, unspecified, Systemic lupus erythematosus, organ or system involvement unspecified, Unspecified osteoarthritis, unspecified site, and Unspecified viral hepatitis C without hepatic coma. She presents with back pain.  She has had chronic lower back pain for more than a year.  She is with her  today who is participating in the history.  She reports her pain is 9 in 10, constant, aching, burning, throbbing, tight, tingling, deep, sharp, shooting radiating from the lower back down both of her legs.  Pain is worse with sitting, standing, lying, bending, coughing, lifting, walking relieved with lying down and medications.  She gets some mild relief with the muscle relaxer.  She is failed to find relief in the past with gabapentin and Lyrica.       Pain Intervention History:      Past Spine Surgical History:      Past and current medications:  Antineuropathics: gabapentin, lyrica  NSAIDs:  Physical therapy: yes, completed   Antidepressants:  Muscle relaxers: tizanidine, robaxin   Opioids:  Antiplatelets/Anticoagulants:    History:    Current Outpatient Medications:     docusate sodium (COLACE) 100 MG capsule, Take 100 mg by mouth 3 (three) times daily as needed for Constipation., Disp: , Rfl:     enzymes,digestive (DIGESTIVE ENZYMES ORAL), Take 1 tablet by mouth 2 (two) times a day.,  Disp: , Rfl:     ergocalciferol (ERGOCALCIFEROL) 50,000 unit Cap, Take 50,000 Units by mouth every 7 days., Disp: , Rfl:     LYCOPENE ORAL, Take by mouth., Disp: , Rfl:     magnesium gluconate 27.5 mg magne- sium (500 mg) Tab, Take by mouth once., Disp: , Rfl:     methocarbamoL (ROBAXIN) 750 MG Tab, Take 1 tablet (750 mg total) by mouth 3 (three) times daily., Disp: 180 tablet, Rfl: 0    niacinamide 500 mg Tab, Take 500 mg by mouth every evening., Disp: , Rfl:     OLANZapine (ZYPREXA) 5 MG tablet, Take 1 tablet (5 mg total) by mouth every evening., Disp: 90 tablet, Rfl: 3    tiotropium-olodateroL (STIOLTO RESPIMAT) 2.5-2.5 mcg/actuation Mist, Inhale 2 puffs into the lungs once daily. Controller, Disp: 4 g, Rfl: 11    tiZANidine (ZANAFLEX) 4 MG tablet, TAKE 1 TABLET (4 MG TOTAL) BY MOUTH EVERY 8 HOURS FOR 10 DAYS, Disp: 30 tablet, Rfl: 2    colesevelam (WELCHOL) 625 mg tablet, Take by mouth 2 (two) times daily with meals. (Patient not taking: Reported on 4/29/2024), Disp: , Rfl:   No current facility-administered medications for this visit.    Facility-Administered Medications Ordered in Other Visits:     lactated ringers infusion, , Intravenous, Continuous, Shreyas Christopher MD, Last Rate: 25 mL/hr at 05/07/24 1100, New Bag at 05/07/24 1100    LIDOcaine (PF) 10 mg/ml (1%) injection 10 mg, 1 mL, Intradermal, Once, Shreyas Christopher MD    Past Medical History:   Diagnosis Date    Arthritis     Cancer     not sure    Coma 1990    ? after medication  x11 days    COPD (chronic obstructive pulmonary disease)     Decreased circulation     Dementia with psychosis     Endometriosis, unspecified     Fracture, femur     right    Fracture, foot     right    Heel fracture     left    Hiatal hernia with GERD     Hypertension     Pulmonary embolism     Respiratory distress     Rheumatoid arthritis, unspecified     Systemic lupus erythematosus, organ or system involvement unspecified     Unspecified osteoarthritis, unspecified site      Unspecified viral hepatitis C without hepatic coma        Past Surgical History:   Procedure Laterality Date    ANTERIOR CERVICAL DISCECTOMY W/ FUSION N/A 03/28/2023    Procedure: DISCECTOMY, SPINE, CERVICAL, ANTERIOR APPROACH, WITH FUSION;  Surgeon: Soto Heller DO;  Location: [facility] OR;  Service: Neurosurgery;  Laterality: N/A;  [facility] notified 3/27 JG, C-ARM, MI[employer], [employer] notified 3/27 JG    COLONOSCOPY      prior to 2018, normal findings per patient report    EPIDURAL STEROID INJECTION INTO LUMBAR SPINE N/A 5/7/2024    Procedure: Injection-steroid-epidural-lumbar;  Surgeon: Shreyas Christopher MD;  Location: [facility];  Service: Anesthesiology;  Laterality: N/A;  L4-5    HYSTERECTOMY      TONSILLECTOMY      UPPER GASTROINTESTINAL ENDOSCOPY      prior to 2018, normal findings per patient report       Family History   Problem Relation Name Age of Onset    Crohn's disease Mother      Colon cancer Neg Hx      Celiac disease Neg Hx      Esophageal cancer Neg Hx      Stomach cancer Neg Hx      Ulcerative colitis Neg Hx         Social History     Socioeconomic History    Marital status:    Tobacco Use    Smoking status: Every Day     Current packs/day: 0.50     Average packs/day: 0.5 packs/day for 55.0 years (27.5 ttl pk-yrs)     Types: Cigarettes    Smokeless tobacco: Never   Substance and Sexual Activity    Alcohol use: Not Currently    Drug use: Yes     Types: Hydrocodone, Oxycodone       Review of patient's allergies indicates:   Allergen Reactions    Demerol [meperidine] Nausea And Vomiting    Penicillins Nausea And Vomiting and Rash    Sulfa (sulfonamide antibiotics) Rash and Blisters    Bactrim [sulfamethoxazole-trimethoprim]      Not work for infection    Chantix [varenicline] Other (See Comments)     Tongue bleed,  and bleeding ulcer    Iodinated contrast media Itching     No swelling or rash; did good with allergy protocol    Keflex [cephalexin] Other (See Comments)     Works for day/or two, than  "infection back     Nicotine Dermatitis     Patch     Wellbutrin [bupropion hcl] Other (See Comments)     Tongue bleed, bleeding ulcer        Review of Systems:  Positive for chills, fatigue, rash, itching, vision change, eye pain, ear pain, wheezing, cough, constipation, diarrhea, abdominal pain, nausea, urinary urgency, urinary frequency, joint stiffness, joint swelling, back pain, memory loss, dizziness, difficulty sleeping, anxiety, depression.  Balance of review of systems is negative.    Physical Exam:  Vitals:    07/09/24 0923   BP: (!) 148/93   Pulse: 88   Weight: 56.4 kg (124 lb 6.5 oz)   Height: 5' 3" (1.6 m)   PainSc:   9   PainLoc: Back     Body mass index is 22.04 kg/m².    Gen: NAD  Psych: mood appropriate for given condition  HEENT: eyes anicteric   CV: RRR  HEENT: anicteric   Respiratory: non-labored, no signs of respiratory distress  Abd: non-distended  Skin: warm, dry and intact.  Gait: No antalgic gait.     Coordination:   Romberg: negative  Finger to nose coordination: normal  Heel to shin coordination: normal  Tandem walking coordination: normal    Cervical spine: ROM is full in flexion, extension and lateral rotation without increased pain.  Spurling's maneuver causes no neck pain to either side.  Myofascial exam: No Tenderness to palpation across cervical paraspinous region bilaterally.    Lumbar spine:  Lumbar spine: ROM is full with flexion extension and oblique extension with no increased pain.    Alex's test causes no increased pain on either side.    Supine straight leg raise is negative bilaterally.    Internal and external rotation of the hip causes no increased pain on either side.  Myofascial exam: No tenderness to palpation across lumbar paraspinous muscles. No tenderness to palpation over the bilateral greater trochanters and bilateral SI joint    Sensory:  Intact and symmetrical to light touch in C4-T1 dermatomes bilaterally. Intact and symmetrical to light touch in L1-S1 " "dermatomes bilaterally.    Motor:    Right Left   C4 Shoulder Abduction  5  5   C5 Elbow Flexion    5  5   C6 Wrist Extension  5  5   C7 Elbow Extension   5  5   C8/T1 Hand Intrinsics   5  5        Right Left   L2/3 Iliacus Hip flexion  5  5   L3/4 Qudratus Femoris Knee Extension  5  5   L4/5 Tib Anterior Ankle Dorsiflexion   5  5   L5/S1 Extensor Hallicus Longus Great toe extension  5  5   S1/S2 Gastroc/Soleus Plantar Flexion  5  5      Right Left   Triceps DTR 2+ 2+   Biceps DTR 2+ 2+   Brachioradialis DTR 2+ 2+   Patellar DTR 2+ 2+   Achilles DTR 2+ 2+   Cummins Absent  Absent   Clonus Absent Absent   Babinski Absent Absent       Labs:  No results found for: "LABA1C", "HGBA1C"    Lab Results   Component Value Date    WBC 10.04 06/12/2023    HGB 14.2 06/12/2023    HCT 42.6 06/12/2023    MCV 92 06/12/2023     06/12/2023           Imaging:  MRI lumbar spine 3/1/24  FINDINGS:  Vertebral column: The vertebral bodies maintain normal height.  As seen on comparison plain films, there is trace anterolisthesis of L4 on L5.  Alignment is otherwise normal.  The disc spaces are preserved.  Baseline marrow signal is normal.     Spinal canal, conus, epidural space: The spinal canal is developmentally normal.  The conus terminates at the level of L1-2 and is normal in contour and signal intensity.  There is no abnormal epidural soft tissue mass or fluid collection.     Findings by level:     On the sagittal images, the T10-11 and T11-12 levels are normal.  T12-L1: Unremarkable  L1-2: There is a minimal disc bulge and mild facet joint arthropathy.  There is no spinal canal or significant foraminal stenosis.  L2-3: There is a minimal disc bulge.  There is right greater than left facet joint arthropathy.  There is no spinal canal or significant foraminal stenosis.  L3-4: There is a diffuse disc bulge.  There is facet joint arthropathy with ligamentum flavum thickening.  There is borderline to mild spinal stenosis with mild " bilateral foraminal stenosis.  L4-5: There is trace anterolisthesis of L4 on L5.  There is a moderate diffuse disc bulge.  There is moderate to marked facet joint arthropathy with ligamentum flavum thickening.  There is moderate to severe spinal canal and bilateral lateral recess stenosis with moderate right and mild left foraminal stenosis.  L5-S1: There is bilateral facet joint arthropathy.  There is a minimal disc bulge.  There is no spinal canal or significant foraminal stenosis.     Soft tissues, other: The prevertebral soft tissues are normal.  The aorta is normal in caliber.  There is no hydronephrosis.    Assessment:   Problem List Items Addressed This Visit    None  Visit Diagnoses       Lumbar radiculopathy    -  Primary    Relevant Orders    X-Ray Lumbar Complete Including Flex And Ext    Lumbar spondylosis                  Nichelle Barone is a 69 y.o. female patient who has a past medical history of Arthritis, Cancer, Coma, COPD (chronic obstructive pulmonary disease), Decreased circulation, Dementia with psychosis, Endometriosis, unspecified, Fracture, femur, Fracture, foot, Heel fracture, Hiatal hernia with GERD, Hypertension, Pulmonary embolism, Respiratory distress, Rheumatoid arthritis, unspecified, Systemic lupus erythematosus, organ or system involvement unspecified, Unspecified osteoarthritis, unspecified site, and Unspecified viral hepatitis C without hepatic coma. She presents with back pain.  She has had chronic lower back pain for more than a year.  She is with her  today who is participating in the history.  She reports her pain is 9 in 10, constant, aching, burning, throbbing, tight, tingling, deep, sharp, shooting radiating from the lower back down both of her legs.  Pain is worse with sitting, standing, lying, bending, coughing, lifting, walking relieved with lying down and medications.  She gets some mild relief with the muscle relaxer.  She is failed to find relief in the past with  gabapentin and Lyrica.     - on exam she has full strength in his lower extremities and intact sensation to light touch bilateral L2-S1.  She is reproducible pain with lumbar axial facet loading  - I independently reviewed her lumbar MRI and she has multilevel bilateral facet arthropathy.  She has moderate-to-severe central canal narrowing at L4-5  - I am going to get an updated lumbar x-ray today to assess for any interval changes to her bony anatomy that may be contributing to her pain  - they reports she has completed formal physical therapy and for the past 6 months has been maintaining a PT directed home exercise program  - he reports she has failed both gabapentin and Lyrica in the past  - she is status post L4-5 STELLA in May 2024 but reports very little relief.  She has pain I think most secondary to lumbar spondylosis but she also describes symptoms of claudication.  I have recommended we try an L5-S1 STELLA. The risks and benefits of this intervention, and alternative therapies were discussed with the patient.  The discussion of risks included infection, bleeding, need for additional procedures or surgery, nerve damage.  Questions regarding the procedure, risks, expected outcome, and possible side effects were solicited and answered to the patient's satisfaction.  Nichelle Barone wishes to proceed with the injection or procedure.  Written consent was obtained.  - follow up 2-3 weeks post injection.  In the future we can consider facet joint injections      : Not applicable    Angel Leon M.D.  Interventional Pain Medicine / Anesthesiology    This note was completed with dictation software and grammatical errors may exist.

## 2024-07-09 NOTE — PROGRESS NOTES
[facility] New Patient Evaluation      Referred by: Dr. Soto Heller    PCP:     CC:   Chief Complaint   Patient presents with    Mid-back Pain    Neck Pain          7/9/2024     9:25 AM   Last 3 PDI Scores   Pain Disability Index (PDI) 45         HPI:   Nichelle Barone is a 69 y.o. female patient who has a past medical history of Arthritis, Cancer, Coma, COPD (chronic obstructive pulmonary disease), Decreased circulation, Dementia with psychosis, Endometriosis, unspecified, Fracture, femur, Fracture, foot, Heel fracture, Hiatal hernia with GERD, Hypertension, Pulmonary embolism, Respiratory distress, Rheumatoid arthritis, unspecified, Systemic lupus erythematosus, organ or system involvement unspecified, Unspecified osteoarthritis, unspecified site, and Unspecified viral hepatitis C without hepatic coma. She presents with back pain.  She has had chronic lower back pain for more than a year.  She is with her  today who is participating in the history.  She reports her pain is 9 in 10, constant, aching, burning, throbbing, tight, tingling, deep, sharp, shooting radiating from the lower back down both of her legs.  Pain is worse with sitting, standing, lying, bending, coughing, lifting, walking relieved with lying down and medications.  She gets some mild relief with the muscle relaxer.  She is failed to find relief in the past with gabapentin and Lyrica.       Pain Intervention History:      Past Spine Surgical History:      Past and current medications:  Antineuropathics: gabapentin, lyrica  NSAIDs:  Physical therapy: yes, completed   Antidepressants:  Muscle relaxers: tizanidine, robaxin   Opioids:  Antiplatelets/Anticoagulants:    History:    Current Outpatient Medications:     docusate sodium (COLACE) 100 MG capsule, Take 100 mg by mouth 3 (three) times daily as needed for Constipation., Disp: , Rfl:     enzymes,digestive (DIGESTIVE ENZYMES ORAL), Take 1 tablet by mouth 2 (two) times a day.,  Disp: , Rfl:     ergocalciferol (ERGOCALCIFEROL) 50,000 unit Cap, Take 50,000 Units by mouth every 7 days., Disp: , Rfl:     LYCOPENE ORAL, Take by mouth., Disp: , Rfl:     magnesium gluconate 27.5 mg magne- sium (500 mg) Tab, Take by mouth once., Disp: , Rfl:     methocarbamoL (ROBAXIN) 750 MG Tab, Take 1 tablet (750 mg total) by mouth 3 (three) times daily., Disp: 180 tablet, Rfl: 0    niacinamide 500 mg Tab, Take 500 mg by mouth every evening., Disp: , Rfl:     OLANZapine (ZYPREXA) 5 MG tablet, Take 1 tablet (5 mg total) by mouth every evening., Disp: 90 tablet, Rfl: 3    tiotropium-olodateroL (STIOLTO RESPIMAT) 2.5-2.5 mcg/actuation Mist, Inhale 2 puffs into the lungs once daily. Controller, Disp: 4 g, Rfl: 11    tiZANidine (ZANAFLEX) 4 MG tablet, TAKE 1 TABLET (4 MG TOTAL) BY MOUTH EVERY 8 HOURS FOR 10 DAYS, Disp: 30 tablet, Rfl: 2    colesevelam (WELCHOL) 625 mg tablet, Take by mouth 2 (two) times daily with meals. (Patient not taking: Reported on 4/29/2024), Disp: , Rfl:   No current facility-administered medications for this visit.    Facility-Administered Medications Ordered in Other Visits:     lactated ringers infusion, , Intravenous, Continuous, Shreyas Christopher MD, Last Rate: 25 mL/hr at 05/07/24 1100, New Bag at 05/07/24 1100    LIDOcaine (PF) 10 mg/ml (1%) injection 10 mg, 1 mL, Intradermal, Once, Shreyas Christopher MD    Past Medical History:   Diagnosis Date    Arthritis     Cancer     not sure    Coma 1990    ? after medication  x11 days    COPD (chronic obstructive pulmonary disease)     Decreased circulation     Dementia with psychosis     Endometriosis, unspecified     Fracture, femur     right    Fracture, foot     right    Heel fracture     left    Hiatal hernia with GERD     Hypertension     Pulmonary embolism     Respiratory distress     Rheumatoid arthritis, unspecified     Systemic lupus erythematosus, organ or system involvement unspecified     Unspecified osteoarthritis, unspecified site      Unspecified viral hepatitis C without hepatic coma        Past Surgical History:   Procedure Laterality Date    ANTERIOR CERVICAL DISCECTOMY W/ FUSION N/A 03/28/2023    Procedure: DISCECTOMY, SPINE, CERVICAL, ANTERIOR APPROACH, WITH FUSION;  Surgeon: Soto Heller DO;  Location: [facility] OR;  Service: Neurosurgery;  Laterality: N/A;  IOM notified 3/27 JG, C-ARM, MICRO, [employer] notified 3/27 JG    COLONOSCOPY      prior to 2018, normal findings per patient report    EPIDURAL STEROID INJECTION INTO LUMBAR SPINE N/A 5/7/2024    Procedure: Injection-steroid-epidural-lumbar;  Surgeon: Shreyas Christopher MD;  Location: [facility];  Service: Anesthesiology;  Laterality: N/A;  L4-5    HYSTERECTOMY      TONSILLECTOMY      UPPER GASTROINTESTINAL ENDOSCOPY      prior to 2018, normal findings per patient report       Family History   Problem Relation Name Age of Onset    Crohn's disease Mother      Colon cancer Neg Hx      Celiac disease Neg Hx      Esophageal cancer Neg Hx      Stomach cancer Neg Hx      Ulcerative colitis Neg Hx         Social History     Socioeconomic History    Marital status:    Tobacco Use    Smoking status: Every Day     Current packs/day: 0.50     Average packs/day: 0.5 packs/day for 55.0 years (27.5 ttl pk-yrs)     Types: Cigarettes    Smokeless tobacco: Never   Substance and Sexual Activity    Alcohol use: Not Currently    Drug use: Yes     Types: Hydrocodone, Oxycodone       Review of patient's allergies indicates:   Allergen Reactions    Demerol [meperidine] Nausea And Vomiting    Penicillins Nausea And Vomiting and Rash    Sulfa (sulfonamide antibiotics) Rash and Blisters    Bactrim [sulfamethoxazole-trimethoprim]      Not work for infection    Chantix [varenicline] Other (See Comments)     Tongue bleed,  and bleeding ulcer    Iodinated contrast media Itching     No swelling or rash; did good with allergy protocol    Keflex [cephalexin] Other (See Comments)     Works for day/or two, than  "infection back     Nicotine Dermatitis     Patch     Wellbutrin [bupropion hcl] Other (See Comments)     Tongue bleed, bleeding ulcer        Review of Systems:  Positive for chills, fatigue, rash, itching, vision change, eye pain, ear pain, wheezing, cough, constipation, diarrhea, abdominal pain, nausea, urinary urgency, urinary frequency, joint stiffness, joint swelling, back pain, memory loss, dizziness, difficulty sleeping, anxiety, depression.  Balance of review of systems is negative.    Physical Exam:  Vitals:    07/09/24 0923   BP: (!) 148/93   Pulse: 88   Weight: 56.4 kg (124 lb 6.5 oz)   Height: 5' 3" (1.6 m)   PainSc:   9   PainLoc: Back     Body mass index is 22.04 kg/m².    Gen: NAD  Psych: mood appropriate for given condition  HEENT: eyes anicteric   CV: RRR  HEENT: anicteric   Respiratory: non-labored, no signs of respiratory distress  Abd: non-distended  Skin: warm, dry and intact.  Gait: No antalgic gait.     Coordination:   Romberg: negative  Finger to nose coordination: normal  Heel to shin coordination: normal  Tandem walking coordination: normal    Cervical spine: ROM is full in flexion, extension and lateral rotation without increased pain.  Spurling's maneuver causes no neck pain to either side.  Myofascial exam: No Tenderness to palpation across cervical paraspinous region bilaterally.    Lumbar spine:  Lumbar spine: ROM is full with flexion extension and oblique extension with no increased pain.    Alex's test causes no increased pain on either side.    Supine straight leg raise is negative bilaterally.    Internal and external rotation of the hip causes no increased pain on either side.  Myofascial exam: No tenderness to palpation across lumbar paraspinous muscles. No tenderness to palpation over the bilateral greater trochanters and bilateral SI joint    Sensory:  Intact and symmetrical to light touch in C4-T1 dermatomes bilaterally. Intact and symmetrical to light touch in L1-S1 " "dermatomes bilaterally.    Motor:    Right Left   C4 Shoulder Abduction  5  5   C5 Elbow Flexion    5  5   C6 Wrist Extension  5  5   C7 Elbow Extension   5  5   C8/T1 Hand Intrinsics   5  5        Right Left   L2/3 Iliacus Hip flexion  5  5   L3/4 Qudratus Femoris Knee Extension  5  5   L4/5 Tib Anterior Ankle Dorsiflexion   5  5   L5/S1 Extensor Hallicus Longus Great toe extension  5  5   S1/S2 Gastroc/Soleus Plantar Flexion  5  5      Right Left   Triceps DTR 2+ 2+   Biceps DTR 2+ 2+   Brachioradialis DTR 2+ 2+   Patellar DTR 2+ 2+   Achilles DTR 2+ 2+   Cummins Absent  Absent   Clonus Absent Absent   Babinski Absent Absent       Labs:  No results found for: "LABA1C", "HGBA1C"    Lab Results   Component Value Date    WBC 10.04 06/12/2023    HGB 14.2 06/12/2023    HCT 42.6 06/12/2023    MCV 92 06/12/2023     06/12/2023           Imaging:  MRI lumbar spine 3/1/24  FINDINGS:  Vertebral column: The vertebral bodies maintain normal height.  As seen on comparison plain films, there is trace anterolisthesis of L4 on L5.  Alignment is otherwise normal.  The disc spaces are preserved.  Baseline marrow signal is normal.     Spinal canal, conus, epidural space: The spinal canal is developmentally normal.  The conus terminates at the level of L1-2 and is normal in contour and signal intensity.  There is no abnormal epidural soft tissue mass or fluid collection.     Findings by level:     On the sagittal images, the T10-11 and T11-12 levels are normal.  T12-L1: Unremarkable  L1-2: There is a minimal disc bulge and mild facet joint arthropathy.  There is no spinal canal or significant foraminal stenosis.  L2-3: There is a minimal disc bulge.  There is right greater than left facet joint arthropathy.  There is no spinal canal or significant foraminal stenosis.  L3-4: There is a diffuse disc bulge.  There is facet joint arthropathy with ligamentum flavum thickening.  There is borderline to mild spinal stenosis with mild " bilateral foraminal stenosis.  L4-5: There is trace anterolisthesis of L4 on L5.  There is a moderate diffuse disc bulge.  There is moderate to marked facet joint arthropathy with ligamentum flavum thickening.  There is moderate to severe spinal canal and bilateral lateral recess stenosis with moderate right and mild left foraminal stenosis.  L5-S1: There is bilateral facet joint arthropathy.  There is a minimal disc bulge.  There is no spinal canal or significant foraminal stenosis.     Soft tissues, other: The prevertebral soft tissues are normal.  The aorta is normal in caliber.  There is no hydronephrosis.    Assessment:   Problem List Items Addressed This Visit    None  Visit Diagnoses       Lumbar radiculopathy    -  Primary    Relevant Orders    X-Ray Lumbar Complete Including Flex And Ext    Lumbar spondylosis                  Nichelle Barone is a 69 y.o. female patient who has a past medical history of Arthritis, Cancer, Coma, COPD (chronic obstructive pulmonary disease), Decreased circulation, Dementia with psychosis, Endometriosis, unspecified, Fracture, femur, Fracture, foot, Heel fracture, Hiatal hernia with GERD, Hypertension, Pulmonary embolism, Respiratory distress, Rheumatoid arthritis, unspecified, Systemic lupus erythematosus, organ or system involvement unspecified, Unspecified osteoarthritis, unspecified site, and Unspecified viral hepatitis C without hepatic coma. She presents with back pain.  She has had chronic lower back pain for more than a year.  She is with her  today who is participating in the history.  She reports her pain is 9 in 10, constant, aching, burning, throbbing, tight, tingling, deep, sharp, shooting radiating from the lower back down both of her legs.  Pain is worse with sitting, standing, lying, bending, coughing, lifting, walking relieved with lying down and medications.  She gets some mild relief with the muscle relaxer.  She is failed to find relief in the past with  gabapentin and Lyrica.     - on exam she has full strength in his lower extremities and intact sensation to light touch bilateral L2-S1.  She is reproducible pain with lumbar axial facet loading  - I independently reviewed her lumbar MRI and she has multilevel bilateral facet arthropathy.  She has moderate-to-severe central canal narrowing at L4-5  - I am going to get an updated lumbar x-ray today to assess for any interval changes to her bony anatomy that may be contributing to her pain  - they reports she has completed formal physical therapy and for the past 6 months has been maintaining a PT directed home exercise program  - he reports she has failed both gabapentin and Lyrica in the past  - she is status post L4-5 STELLA in May 2024 but reports very little relief.  She has pain I think most secondary to lumbar spondylosis but she also describes symptoms of claudication.  I have recommended we try an L5-S1 STELLA. The risks and benefits of this intervention, and alternative therapies were discussed with the patient.  The discussion of risks included infection, bleeding, need for additional procedures or surgery, nerve damage.  Questions regarding the procedure, risks, expected outcome, and possible side effects were solicited and answered to the patient's satisfaction.  Nichelle Barone wishes to proceed with the injection or procedure.  Written consent was obtained.  - follow up 2-3 weeks post injection.  In the future we can consider facet joint injections      : Not applicable    Angel Leon M.D.  Interventional Pain Medicine / Anesthesiology    This note was completed with dictation software and grammatical errors may exist.

## 2024-07-09 NOTE — TELEPHONE ENCOUNTER
She will need an updated CBC done.  Order placed.       Physician - Dr Leon    Type of Procedure/Injection - Lumbar Epidural  L5/S1           Laterality - NA      Anxiolysis- Local      Need to hold medication - Yes      Aspirin for 7 days      Clearance needed - No      Follow up - 3 week

## 2024-07-10 DIAGNOSIS — M54.16 LUMBAR RADICULOPATHY: Primary | ICD-10-CM

## 2024-07-10 RX ORDER — ALPRAZOLAM 1 MG/1
1 TABLET, ORALLY DISINTEGRATING ORAL ONCE AS NEEDED
OUTPATIENT
Start: 2024-07-10 | End: 2035-12-07

## 2024-07-10 NOTE — TELEPHONE ENCOUNTER
Spoke with patient's , Lito to schedule. Per provider patient to hold ASA x 7 days prior. Aware that CBC needs to be obtained prior to injection. Pre op information given and follow up appointment scheduled.

## 2024-07-17 ENCOUNTER — LAB VISIT (OUTPATIENT)
Dept: LAB | Facility: HOSPITAL | Age: 69
End: 2024-07-17
Attending: ANESTHESIOLOGY
Payer: MEDICARE

## 2024-07-17 DIAGNOSIS — Z01.818 PRE-OP TESTING: ICD-10-CM

## 2024-07-17 LAB
ERYTHROCYTE [DISTWIDTH] IN BLOOD BY AUTOMATED COUNT: 14.4 % (ref 11.5–14.5)
HCT VFR BLD AUTO: 45.6 % (ref 37–48.5)
HGB BLD-MCNC: 15 G/DL (ref 12–16)
MCH RBC QN AUTO: 30.5 PG (ref 27–31)
MCHC RBC AUTO-ENTMCNC: 32.9 G/DL (ref 32–36)
MCV RBC AUTO: 93 FL (ref 82–98)
PLATELET # BLD AUTO: 243 K/UL (ref 150–450)
PMV BLD AUTO: 12.9 FL (ref 9.2–12.9)
RBC # BLD AUTO: 4.91 M/UL (ref 4–5.4)
WBC # BLD AUTO: 12.68 K/UL (ref 3.9–12.7)

## 2024-07-17 PROCEDURE — 36415 COLL VENOUS BLD VENIPUNCTURE: CPT | Mod: PO | Performed by: ANESTHESIOLOGY

## 2024-07-17 PROCEDURE — 85027 COMPLETE CBC AUTOMATED: CPT | Performed by: ANESTHESIOLOGY

## 2024-07-24 ENCOUNTER — HOSPITAL ENCOUNTER (OUTPATIENT)
Facility: HOSPITAL | Age: 69
Discharge: HOME | End: 2024-07-24
Attending: ANESTHESIOLOGY | Admitting: ANESTHESIOLOGY
Payer: MEDICARE

## 2024-07-24 ENCOUNTER — HOSPITAL ENCOUNTER (OUTPATIENT)
Dept: RADIOLOGY | Facility: HOSPITAL | Age: 69
Discharge: HOME | End: 2024-07-24
Attending: ANESTHESIOLOGY | Admitting: ANESTHESIOLOGY
Payer: MEDICARE

## 2024-07-24 VITALS
WEIGHT: 124 LBS | BODY MASS INDEX: 21.97 KG/M2 | HEART RATE: 75 BPM | DIASTOLIC BLOOD PRESSURE: 80 MMHG | OXYGEN SATURATION: 98 % | TEMPERATURE: 98 F | SYSTOLIC BLOOD PRESSURE: 168 MMHG | HEIGHT: 63 IN | RESPIRATION RATE: 18 BRPM

## 2024-07-24 DIAGNOSIS — M54.16 LUMBAR RADICULOPATHY: Primary | ICD-10-CM

## 2024-07-24 DIAGNOSIS — M54.50 LOWER BACK PAIN: ICD-10-CM

## 2024-07-24 PROCEDURE — 25000003 PHARM REV CODE 250: Mod: PO | Performed by: ANESTHESIOLOGY

## 2024-07-24 PROCEDURE — 62323 NJX INTERLAMINAR LMBR/SAC: CPT | Mod: ,,, | Performed by: ANESTHESIOLOGY

## 2024-07-24 PROCEDURE — 62323 NJX INTERLAMINAR LMBR/SAC: CPT | Mod: PO | Performed by: ANESTHESIOLOGY

## 2024-07-24 PROCEDURE — 76000 FLUOROSCOPY <1 HR PHYS/QHP: CPT | Mod: TC,PO

## 2024-07-24 PROCEDURE — A4216 STERILE WATER/SALINE, 10 ML: HCPCS | Mod: PO | Performed by: ANESTHESIOLOGY

## 2024-07-24 PROCEDURE — 63600175 PHARM REV CODE 636 W HCPCS: Mod: PO | Performed by: ANESTHESIOLOGY

## 2024-07-24 RX ORDER — METHYLPREDNISOLONE ACETATE 80 MG/ML
INJECTION, SUSPENSION INTRA-ARTICULAR; INTRALESIONAL; INTRAMUSCULAR; SOFT TISSUE
Status: DISCONTINUED | OUTPATIENT
Start: 2024-07-24 | End: 2024-07-24 | Stop reason: HOSPADM

## 2024-07-24 RX ORDER — LIDOCAINE HYDROCHLORIDE 10 MG/ML
INJECTION, SOLUTION EPIDURAL; INFILTRATION; INTRACAUDAL; PERINEURAL
Status: DISCONTINUED | OUTPATIENT
Start: 2024-07-24 | End: 2024-07-24 | Stop reason: HOSPADM

## 2024-07-24 RX ORDER — SODIUM CHLORIDE 9 MG/ML
INJECTION, SOLUTION INTRAMUSCULAR; INTRAVENOUS; SUBCUTANEOUS
Status: DISCONTINUED | OUTPATIENT
Start: 2024-07-24 | End: 2024-07-24 | Stop reason: HOSPADM

## 2024-07-24 RX ORDER — ALPRAZOLAM 0.5 MG/1
1 TABLET, ORALLY DISINTEGRATING ORAL ONCE AS NEEDED
Status: COMPLETED | OUTPATIENT
Start: 2024-07-24 | End: 2024-07-24

## 2024-07-24 RX ADMIN — ALPRAZOLAM 1 MG: 0.5 TABLET, ORALLY DISINTEGRATING ORAL at 02:07

## 2024-07-24 NOTE — INTERVAL H&P NOTE
The patient has been examined and the H&P has been reviewed:    I concur with the findings and no changes have occurred since H&P was written.  She has held her aspirin appropriately. The risks and benefits of this intervention, and alternative therapies were discussed with the patient.  The discussion of risks included infection, bleeding, need for additional procedures or surgery, nerve damage.  Questions regarding the procedure, risks, expected outcome, and possible side effects were solicited and answered to the patient's satisfaction.  Nichelle Barone wishes to proceed with the injection or procedure.  Written consent was obtained.      There are no hospital problems to display for this patient.

## 2024-07-24 NOTE — OP NOTE
"Procedure Note    Procedure Date: 7/24/2024    Procedure Performed:  L5/s1 lumbar interlaminar epidural steroid injection under fluoroscopy.    Indications:  Nichelle Barone presents with lumbar radiculitis/radiculopathy secondary to disc herniation, osteophyte/osteophyte complexes, and/or severe degenerative disc disease producing foraminal or central spinal stenosis.  The pain has been present for at least 4 weeks and the patient has failed to respond to noninvasive conservative care.  Pain rated by NRS at baseline prior to intervention is 6/10.  Their radiculitis/radiculopathy and/or neurogenic claudication is severe enough to greatly impact their quality of life or function.     Pre-op diagnosis: Lumbar Radiculopathy    Post-op diagnosis: same    Physician: Angel Leon MD    IV anxiolysis medications: none    Medications injected: depomedrol 80mg, 1% Lidocaine 1ml, 2 mL sterile, preservative-free normal saline.    Local anesthetic used: 1% Lidocaine, 1 ml    Estimated Blood Loss: none    Complications:  none    Technique:  The patient was interviewed in the holding area and Risks/Benefits were discussed, including, but not limited to, the possibility of new or different pain, bleeding or infection.   All questions were answered.  The patient understood and accepted risks.  Consent was verfied.  A time-out was taken to identify patient and procedure prior to starting the procedure. The patient was placed in the prone position on the fluoroscopy table. The area of the lumbar spine was prepped with Chloraprep x2 and draped in a sterile manner. The L5/S1 interspace was identified and marked under AP fluoroscopy. The skin and subcutaneous tissues overlying the targeted interspace were anesthetized with 3-5 mL of 1% lidocaine using a 25G, 1.5" needle.  A 20G, 3.5" Tuohy epidural needle was directed toward the interspace under fluoroscopic guidance until the ligamentum flavum was engaged. From this point, a loss of " resistance technique with a glass syringe and saline was used to identify entrance of the needle into the epidural space. No contrast, allergic.  A 4 mL mixture consisting of saline, 1 mL 1% Lidocaine and 80 mg of depomedrol was injected slowly and without resistance.  The needle was flushed with normal saline and removed. The contrast was seen to be displaced after injection. Patient was awake/responsive during all injections.  The patient tolerated the procedure well and was transferred to the [facility] in stable condition.  The patient was monitored after the procedure and was given post-procedure and discharge instructions to follow at home. The patient was discharged in a stable condition.

## 2024-07-24 NOTE — DISCHARGE SUMMARY
[facility]  Discharge Note  Short Stay    Admit Date: 7/24/2024    Discharge Date: 7/24/2024    Attending Physician: Angel Leon     Discharge Provider: Angel Leon    Diagnoses:  There are no hospital problems to display for this patient.      Discharged Condition: Good    Final Diagnoses: Lumbar radiculopathy [M54.16]    Disposition: Home or Self Care    Hospital Course: No complications, uneventful    Outcome of Hospitalization, Treatment, Procedure, or Surgery:  Patient was admitted for outpatient interventional pain management procedure. The patient tolerated the procedure well with no complications.    Follow up/Patient Instructions:  Follow up as scheduled in Pain Management office in 2-3 weeks.  Patient has received instructions and follow up date and time.    Medications:  Continue previous medications    Discharge Procedure Orders   Notify your health care provider if you experience any of the following:  temperature >100.4     Notify your health care provider if you experience any of the following:  persistent nausea and vomiting or diarrhea     Notify your health care provider if you experience any of the following:  severe uncontrolled pain     Notify your health care provider if you experience any of the following:  redness, tenderness, or signs of infection (pain, swelling, redness, odor or green/yellow discharge around incision site)     Notify your health care provider if you experience any of the following:  difficulty breathing or increased cough     Notify your health care provider if you experience any of the following:  severe persistent headache     Notify your health care provider if you experience any of the following:  worsening rash     Notify your health care provider if you experience any of the following:  persistent dizziness, light-headedness, or visual disturbances     Notify your health care provider if you experience any of the following:  increased confusion or  weakness     Activity as tolerated

## 2024-08-22 ENCOUNTER — TELEPHONE (OUTPATIENT)
Dept: ORTHOPEDICS | Facility: CLINIC | Age: 69
End: 2024-08-22
Payer: MEDICARE

## 2024-08-22 NOTE — TELEPHONE ENCOUNTER
calling today to schedule back and shoulder with office,  I explained ortho does not see back issues, and for pt to continue with back and spine appts Pain management.   got upset and stated all they do is give her psycho drugs, and wants a doctor that would give her proper pain medication.  Stated we would NOT give her pain medicine, disconnected call

## 2024-09-09 NOTE — TELEPHONE ENCOUNTER
Please see the attached refill request. Pt last had filled for 60 day supply by you on 6/13/24, was last seen by Dr. Heller on 6/13/24, had an ACDF C5-7 on 3/28/23.

## 2024-09-10 RX ORDER — METHOCARBAMOL 750 MG/1
750 TABLET, FILM COATED ORAL 3 TIMES DAILY
Qty: 180 TABLET | Refills: 0 | Status: SHIPPED | OUTPATIENT
Start: 2024-09-10

## 2024-09-16 ENCOUNTER — OFFICE VISIT (OUTPATIENT)
Dept: PAIN MEDICINE | Facility: CLINIC | Age: 69
End: 2024-09-16
Payer: MEDICARE

## 2024-09-16 VITALS
HEIGHT: 63 IN | DIASTOLIC BLOOD PRESSURE: 89 MMHG | WEIGHT: 122.69 LBS | HEART RATE: 88 BPM | BODY MASS INDEX: 21.74 KG/M2 | SYSTOLIC BLOOD PRESSURE: 153 MMHG

## 2024-09-16 DIAGNOSIS — M47.816 LUMBAR SPONDYLOSIS: ICD-10-CM

## 2024-09-16 DIAGNOSIS — M79.7 FIBROMYALGIA: Primary | ICD-10-CM

## 2024-09-16 PROCEDURE — 99999 PR PBB SHADOW E&M-EST. PATIENT-LVL III: CPT | Mod: PBBFAC,,, | Performed by: ANESTHESIOLOGY

## 2024-09-16 PROCEDURE — 99214 OFFICE O/P EST MOD 30 MIN: CPT | Mod: S$PBB,,, | Performed by: ANESTHESIOLOGY

## 2024-09-16 PROCEDURE — 99213 OFFICE O/P EST LOW 20 MIN: CPT | Mod: PBBFAC,PO | Performed by: ANESTHESIOLOGY

## 2024-09-16 NOTE — PROGRESS NOTES
[facility] Follow Up Evaluation      Referred by: No ref. provider found    PCP:     CC:   Chief Complaint   Patient presents with    Follow-up    Pain    Low-back Pain    Mid-back Pain      - pain in back            9/16/2024     2:39 PM 7/9/2024     9:25 AM   Last 3 PDI Scores   Pain Disability Index (PDI) 27 45       Interval HPI 9/16/24: Ms. Barone returns to the office for follow up.  She is status post L5-S1 STELLA on 07/24/2024 significant relief.  Today she reports lower back pain that extends up the middle of her back.  Her pain is 9/10, constant.  No new numbness or weakness but she reports her pain is severe at times she does not feel she can get out of bed.    HPI:   Nichelle Barone is a 69 y.o. female patient who has a past medical history of Arthritis, Cancer, Coma, COPD (chronic obstructive pulmonary disease), Decreased circulation, Dementia with psychosis, Endometriosis, unspecified, Fracture, femur, Fracture, foot, Heel fracture, Hiatal hernia with GERD, Hypertension, Pulmonary embolism, Respiratory distress, Rheumatoid arthritis, unspecified, Systemic lupus erythematosus, organ or system involvement unspecified, Unspecified osteoarthritis, unspecified site, and Unspecified viral hepatitis C without hepatic coma. She presents with back pain.  She has had chronic lower back pain for more than a year.  She is with her  today who is participating in the history.  She reports her pain is 9 in 10, constant, aching, burning, throbbing, tight, tingling, deep, sharp, shooting radiating from the lower back down both of her legs.  Pain is worse with sitting, standing, lying, bending, coughing, lifting, walking relieved with lying down and medications.  She gets some mild relief with the muscle relaxer.  She is failed to find relief in the past with gabapentin and Lyrica.       Pain Intervention History:  - s/p L4/5 STELLA on 5/7/24  - s/p L5/S1 STELLA on 7/24/24    Past Spine Surgical  History:      Past and current medications:  Antineuropathics: gabapentin, lyrica  NSAIDs:  Physical therapy: yes, completed   Antidepressants:  Muscle relaxers: tizanidine, robaxin   Opioids:  Antiplatelets/Anticoagulants:    History:    Current Outpatient Medications:     colesevelam (WELCHOL) 625 mg tablet, Take by mouth 2 (two) times daily with meals., Disp: , Rfl:     docusate sodium (COLACE) 100 MG capsule, Take 100 mg by mouth 3 (three) times daily as needed for Constipation., Disp: , Rfl:     enzymes,digestive (DIGESTIVE ENZYMES ORAL), Take 1 tablet by mouth 2 (two) times a day., Disp: , Rfl:     ergocalciferol (ERGOCALCIFEROL) 50,000 unit Cap, Take 50,000 Units by mouth every 7 days., Disp: , Rfl:     LYCOPENE ORAL, Take by mouth., Disp: , Rfl:     magnesium gluconate 27.5 mg magne- sium (500 mg) Tab, Take by mouth once., Disp: , Rfl:     methocarbamoL (ROBAXIN) 750 MG Tab, TAKE 1 TABLET BY MOUTH 3 TIMES A DAY, Disp: 180 tablet, Rfl: 0    niacinamide 500 mg Tab, Take 500 mg by mouth every evening., Disp: , Rfl:     OLANZapine (ZYPREXA) 5 MG tablet, Take 1 tablet (5 mg total) by mouth every evening., Disp: 90 tablet, Rfl: 3    tiotropium-olodateroL (STIOLTO RESPIMAT) 2.5-2.5 mcg/actuation Mist, Inhale 2 puffs into the lungs once daily. Controller, Disp: 4 g, Rfl: 11    tiZANidine (ZANAFLEX) 4 MG tablet, TAKE 1 TABLET (4 MG TOTAL) BY MOUTH EVERY 8 HOURS FOR 10 DAYS, Disp: 30 tablet, Rfl: 2    naltrexone capsule, Take 1 capsule (4.5 mg total) by mouth every evening., Disp: 30 capsule, Rfl: 1  No current facility-administered medications for this visit.    Facility-Administered Medications Ordered in Other Visits:     lactated ringers infusion, , Intravenous, Continuous, Shreyas Christopher MD, Last Rate: 25 mL/hr at 05/07/24 1100, New Bag at 05/07/24 1100    LIDOcaine (PF) 10 mg/ml (1%) injection 10 mg, 1 mL, Intradermal, Once, Shreyas Christopher MD    Past Medical History:   Diagnosis Date    Arthritis     Cancer     not  sure    Coma 1990    ? after medication  x11 days    COPD (chronic obstructive pulmonary disease)     Decreased circulation     Dementia with psychosis     Endometriosis, unspecified     Fracture, femur     right    Fracture, foot     right    Heel fracture     left    Hiatal hernia with GERD     Hypertension     Pulmonary embolism     Respiratory distress     Rheumatoid arthritis, unspecified     Systemic lupus erythematosus, organ or system involvement unspecified     Unspecified osteoarthritis, unspecified site     Unspecified viral hepatitis C without hepatic coma        Past Surgical History:   Procedure Laterality Date    ANTERIOR CERVICAL DISCECTOMY W/ FUSION N/A 03/28/2023    Procedure: DISCECTOMY, SPINE, CERVICAL, ANTERIOR APPROACH, WITH FUSION;  Surgeon: Soto Heller DO;  Location: [facility] OR;  Service: Neurosurgery;  Laterality: N/A;  [facility] notified 3/27 JG, C-ARM, MICRO, [employer] notified 3/27 JG    COLONOSCOPY      prior to 2018, normal findings per patient report    EPIDURAL STEROID INJECTION INTO LUMBAR SPINE N/A 5/7/2024    Procedure: Injection-steroid-epidural-lumbar;  Surgeon: Shreyas Christopher MD;  Location: [facility] OR;  Service: Anesthesiology;  Laterality: N/A;  L4-5    EPIDURAL STEROID INJECTION INTO LUMBAR SPINE N/A 7/24/2024    Procedure: Injection-steroid-epidural-lumbar  L5/S1;  Surgeon: Angel Leon MD;  Location: [facility] OR;  Service: Pain Management;  Laterality: N/A;    HYSTERECTOMY      TONSILLECTOMY      UPPER GASTROINTESTINAL ENDOSCOPY      prior to 2018, normal findings per patient report       Family History   Problem Relation Name Age of Onset    Crohn's disease Mother      Colon cancer Neg Hx      Celiac disease Neg Hx      Esophageal cancer Neg Hx      Stomach cancer Neg Hx      Ulcerative colitis Neg Hx         Social History     Socioeconomic History    Marital status:    Tobacco Use    Smoking status: Every Day     Current packs/day: 0.50     Average packs/day: 0.5  "packs/day for 55.0 years (27.5 ttl pk-yrs)     Types: Cigarettes    Smokeless tobacco: Never   Substance and Sexual Activity    Alcohol use: Not Currently    Drug use: Yes     Types: Hydrocodone, Oxycodone       Review of patient's allergies indicates:   Allergen Reactions    Demerol [meperidine] Nausea And Vomiting    Penicillins Nausea And Vomiting and Rash    Plaquenil [hydroxychloroquine] Rash    Sulfa (sulfonamide antibiotics) Rash and Blisters    Bactrim [sulfamethoxazole-trimethoprim]      Not work for infection    Chantix [varenicline] Other (See Comments)     Tongue bleed,  and bleeding ulcer    Iodinated contrast media Itching     No swelling or rash; did good with allergy protocol    Keflex [cephalexin] Other (See Comments)     Works for day/or two, than infection back     Nicotine Dermatitis     Patch     Wellbutrin [bupropion hcl] Other (See Comments)     Tongue bleed, bleeding ulcer        Review of Systems:  Positive for chills, fatigue, rash, itching, vision change, eye pain, ear pain, wheezing, cough, constipation, diarrhea, abdominal pain, nausea, urinary urgency, urinary frequency, joint stiffness, joint swelling, back pain, memory loss, dizziness, difficulty sleeping, anxiety, depression.  Balance of review of systems is negative.    Physical Exam:  Vitals:    09/16/24 1441   BP: (!) 153/89   Pulse: 88   Weight: 55.7 kg (122 lb 11 oz)   Height: 5' 3" (1.6 m)   PainSc:   9   PainLoc: Back     Body mass index is 21.73 kg/m².    Gen: NAD  Psych: mood appropriate for given condition  HEENT: eyes anicteric   CV: RRR  HEENT: anicteric   Respiratory: non-labored, no signs of respiratory distress  Abd: non-distended  Skin: warm, dry and intact.  Gait: No antalgic gait.     Reproducible pain with lumbar axial facet loading    Sensory:  Intact and symmetrical to light touch in L1-S1 dermatomes bilaterally.    Motor:     Right Left   L2/3 Iliacus Hip flexion  5  5   L3/4 Qudratus Femoris Knee Extension  5  " "5   L4/5 Tib Anterior Ankle Dorsiflexion   5  5   L5/S1 Extensor Hallicus Longus Great toe extension  5  5   S1/S2 Gastroc/Soleus Plantar Flexion  5  5       Labs:  No results found for: "LABA1C", "HGBA1C"    Lab Results   Component Value Date    WBC 12.68 07/17/2024    HGB 15.0 07/17/2024    HCT 45.6 07/17/2024    MCV 93 07/17/2024     07/17/2024           Imaging:  MRI lumbar spine 3/1/24  FINDINGS:  Vertebral column: The vertebral bodies maintain normal height.  As seen on comparison plain films, there is trace anterolisthesis of L4 on L5.  Alignment is otherwise normal.  The disc spaces are preserved.  Baseline marrow signal is normal.     Spinal canal, conus, epidural space: The spinal canal is developmentally normal.  The conus terminates at the level of L1-2 and is normal in contour and signal intensity.  There is no abnormal epidural soft tissue mass or fluid collection.     Findings by level:     On the sagittal images, the T10-11 and T11-12 levels are normal.  T12-L1: Unremarkable  L1-2: There is a minimal disc bulge and mild facet joint arthropathy.  There is no spinal canal or significant foraminal stenosis.  L2-3: There is a minimal disc bulge.  There is right greater than left facet joint arthropathy.  There is no spinal canal or significant foraminal stenosis.  L3-4: There is a diffuse disc bulge.  There is facet joint arthropathy with ligamentum flavum thickening.  There is borderline to mild spinal stenosis with mild bilateral foraminal stenosis.  L4-5: There is trace anterolisthesis of L4 on L5.  There is a moderate diffuse disc bulge.  There is moderate to marked facet joint arthropathy with ligamentum flavum thickening.  There is moderate to severe spinal canal and bilateral lateral recess stenosis with moderate right and mild left foraminal stenosis.  L5-S1: There is bilateral facet joint arthropathy.  There is a minimal disc bulge.  There is no spinal canal or significant foraminal " stenosis.     Soft tissues, other: The prevertebral soft tissues are normal.  The aorta is normal in caliber.  There is no hydronephrosis.    Xray lumbar spine 7/9/24  FINDINGS:  Vertebral bodies are normal in height without evidence of fracture.  Slight levoconvex curvature of the lumbar spine.  Lumbar lordosis is maintained.  Stable mild grade 1 anterolisthesis of L4 on L5. No abnormal translation with flexion and extension.  Multilevel mild intervertebral disc space narrowing and anterior marginal osteophyte formation.  Moderate multilevel facet arthropathy.  Aortoiliac calcific atherosclerosis.    Assessment:   Problem List Items Addressed This Visit    None  Visit Diagnoses       Fibromyalgia    -  Primary    Relevant Medications    naltrexone capsule    Lumbar spondylosis                  Nichelle Barone is a 69 y.o. female patient who has a past medical history of Arthritis, Cancer, Coma, COPD (chronic obstructive pulmonary disease), Decreased circulation, Dementia with psychosis, Endometriosis, unspecified, Fracture, femur, Fracture, foot, Heel fracture, Hiatal hernia with GERD, Hypertension, Pulmonary embolism, Respiratory distress, Rheumatoid arthritis, unspecified, Systemic lupus erythematosus, organ or system involvement unspecified, Unspecified osteoarthritis, unspecified site, and Unspecified viral hepatitis C without hepatic coma. She presents with back pain.  She has had chronic lower back pain for more than a year.  She is with her  today who is participating in the history.  She reports her pain is 9 in 10, constant, aching, burning, throbbing, tight, tingling, deep, sharp, shooting radiating from the lower back down both of her legs.  Pain is worse with sitting, standing, lying, bending, coughing, lifting, walking relieved with lying down and medications.  She gets some mild relief with the muscle relaxer.  She is failed to find relief in the past with gabapentin and Lyrica.     9/16/24 - Ms.  Lizabeth returns to the office for follow up.  She is status post L5-S1 STELLA on 07/24/2024 significant relief.  Today she reports lower back pain that extends up the middle of her back.  Her pain is 9/10, constant.  No new numbness or weakness but she reports her pain is severe at times she does not feel she can get out of bed.    - she continues to have pain with axial facet  - I independently reviewed her lumbar MRI again with her and her  today.  She has multilevel bilateral facet arthropathy.  There is moderate to severe central canal narrowing at L4-5  - at this point she has failed to find relief with L4-5 and L5-S1 lumbar STELLA  - she has completed formal physical therapy in the past and she has tried to maintain PT directed home exercise program.  Unfortunately sometimes her pain is so severe she does not feel like she can walk down to where there is a pool to participate in some home aquatic therapy  - she has failed both gabapentin and Lyrica in the past with the neuropathic component of her pain.  She reports she has a known diagnosis of fibromyalgia for over the past 50 years  - I suspect she still has pain related to her facets as she has pain with facet loading however I think there is a strong component of fibromyalgia causing her pain  - I would like to try naltrexone 4.5 mg to take every evening to see how she responds.  I have prescribed this for her today.  She will reach out to me in a couple weeks and if she continues to have lower back pain we can consider facet injections at L4-5 and L5-S1 bilaterally.      : Not applicable    Angel Leon M.D.  Interventional Pain Medicine / Anesthesiology    This note was completed with dictation software and grammatical errors may exist.

## 2024-10-02 ENCOUNTER — TELEPHONE (OUTPATIENT)
Dept: PAIN MEDICINE | Facility: CLINIC | Age: 69
End: 2024-10-02
Payer: MEDICARE

## 2024-10-02 NOTE — TELEPHONE ENCOUNTER
----- Message from Greer sent at 10/2/2024  3:50 PM CDT -----  Regarding: advice  Type:  Needs Medical Advice    Who Called:  michaela Estevze Call Back Number: [phone]      Additional Information: michaela joens that med that dr parsons is not working. Wants a call back to discuss.  please call to discuss.

## 2024-10-03 ENCOUNTER — OFFICE VISIT (OUTPATIENT)
Dept: GASTROENTEROLOGY | Facility: CLINIC | Age: 69
End: 2024-10-03
Payer: MEDICARE

## 2024-10-03 VITALS — HEIGHT: 63 IN | BODY MASS INDEX: 21.21 KG/M2 | WEIGHT: 119.69 LBS

## 2024-10-03 DIAGNOSIS — R19.8 IRREGULAR BOWEL HABITS: ICD-10-CM

## 2024-10-03 DIAGNOSIS — R10.84 GENERALIZED ABDOMINAL PAIN: Primary | ICD-10-CM

## 2024-10-03 DIAGNOSIS — K31.89 GASTRIC WALL THICKENING: ICD-10-CM

## 2024-10-03 DIAGNOSIS — R19.8 TENESMUS: ICD-10-CM

## 2024-10-03 DIAGNOSIS — R93.3 ABNORMAL CT SCAN, GASTROINTESTINAL TRACT: ICD-10-CM

## 2024-10-03 DIAGNOSIS — Z86.19 HISTORY OF HEPATITIS C: ICD-10-CM

## 2024-10-03 DIAGNOSIS — K92.0 HEMATEMESIS OF UNKNOWN ETIOLOGY: ICD-10-CM

## 2024-10-03 DIAGNOSIS — K22.89 ESOPHAGEAL THICKENING: ICD-10-CM

## 2024-10-03 DIAGNOSIS — R11.2 NON-INTRACTABLE VOMITING WITH NAUSEA: ICD-10-CM

## 2024-10-03 DIAGNOSIS — Z87.898 HISTORY OF SHORTNESS OF BREATH: ICD-10-CM

## 2024-10-03 DIAGNOSIS — K52.9 CHRONIC DIARRHEA: ICD-10-CM

## 2024-10-03 DIAGNOSIS — R13.14 PHARYNGOESOPHAGEAL DYSPHAGIA: ICD-10-CM

## 2024-10-03 DIAGNOSIS — Z87.39 HISTORY OF BACK PAIN: ICD-10-CM

## 2024-10-03 DIAGNOSIS — Z83.79 FAMILY HISTORY OF CROHN'S DISEASE: ICD-10-CM

## 2024-10-03 PROBLEM — M54.9 CHRONIC BACK PAIN: Status: ACTIVE | Noted: 2024-10-03

## 2024-10-03 PROBLEM — R10.13 EPIGASTRIC PAIN: Status: ACTIVE | Noted: 2024-10-03

## 2024-10-03 PROBLEM — I21.4 NSTEMI (NON-ST ELEVATED MYOCARDIAL INFARCTION): Status: ACTIVE | Noted: 2024-10-03

## 2024-10-03 PROBLEM — R13.10 DYSPHAGIA: Status: ACTIVE | Noted: 2024-10-03

## 2024-10-03 PROBLEM — G89.29 CHRONIC BACK PAIN: Status: ACTIVE | Noted: 2024-10-03

## 2024-10-03 PROBLEM — E86.9 VOLUME DEPLETION: Status: ACTIVE | Noted: 2024-10-03

## 2024-10-03 PROCEDURE — 99213 OFFICE O/P EST LOW 20 MIN: CPT | Mod: PBBFAC,PO | Performed by: NURSE PRACTITIONER

## 2024-10-03 PROCEDURE — 99999 PR PBB SHADOW E&M-EST. PATIENT-LVL III: CPT | Mod: PBBFAC,,, | Performed by: NURSE PRACTITIONER

## 2024-10-03 NOTE — PROGRESS NOTES
Subjective:       Patient ID: Nichelle Barone is a 69 y.o. female Body mass index is 21.21 kg/m².    Chief Complaint: Abdominal Pain, Constipation, and Diarrhea    This patient is established.     Patient is here with her , whom assisted with history. Patient reports she wanted to go to the ER last night due to severe abdominal pain, nausea and vomiting. Her  called and was able to get this outpatient appointment for today so they did not go to the ER. Patient did not complete EGD as previously recommended. Patient reports she is willing to do any testing needed/indicated including endoscopies.    GI Problem  The primary symptoms include weight loss (not eating much due to pain with eating and vomiting; trying to drink a protein shake a day), fatigue, abdominal pain, nausea (frequent), vomiting and diarrhea (CHIEF COMPLAINT). Primary symptoms do not include fever, melena, hematemesis, jaundice, hematochezia or dysuria.   The abdominal pain began more than 2 days ago (started ~2017). The abdominal pain has been gradually worsening (since starting naltrexone ~3 weeks ago) since its onset. The abdominal pain is generalized (constant; starts to RUQ then radiates to generalized abdomen). The severity of the abdominal pain is 9/10 (currently, but reports gets to 10/10).   The vomiting began more than 2 days ago. Frequency: occurs every other day. The emesis contains bilious material and stomach contents (sputum; pink tinged yesterday).   The diarrhea began more than 1 week ago (started ~2017). The diarrhea is semi-solid and watery. The diarrhea occurs more than 10 times per day. Risk factors: took ivermectin last month for COVID infection; denies recent hospitalization.   The illness is also significant for dysphagia (started over a year ago with everytime she eats, went through the front of her neck for her neck surgery), constipation (has bowel movements daily, tenesmuc; described as straining to pass since  on pain medication), tenesmus and back pain (seeing specialist). The illness does not include chills. Associated symptoms comments: TREATMENT: digestive enzymes BID, colace tid prn  PAST TREATMENT: welchol. Significant associated medical issues include GERD (occasional at night) and liver disease (history of hepatitis C- treated in the past).       Review of Systems   Constitutional:  Positive for appetite change (decreased), fatigue and weight loss (not eating much due to pain with eating and vomiting; trying to drink a protein shake a day). Negative for chills and fever.        Taking Naltrexone   HENT:  Positive for trouble swallowing. Negative for sore throat.    Respiratory:  Positive for cough and shortness of breath (occasional, denies currently; history of COPD). Negative for choking.    Cardiovascular:  Negative for chest pain.   Gastrointestinal:  Positive for abdominal pain, constipation (has bowel movements daily, tenesmuc; described as straining to pass since on pain medication), diarrhea (CHIEF COMPLAINT), dysphagia (started over a year ago with everytime she eats, went through the front of her neck for her neck surgery), nausea (frequent) and vomiting. Negative for anal bleeding, blood in stool, hematemesis, hematochezia, jaundice, melena and rectal pain.   Genitourinary:  Negative for difficulty urinating, dysuria and flank pain.   Musculoskeletal:  Positive for back pain (seeing specialist).   Neurological:  Negative for weakness.         No LMP recorded. Patient has had a hysterectomy.  Past Medical History:   Diagnosis Date    Arthritis     Cancer     not sure    Coma 1990    ? after medication  x11 days    COPD (chronic obstructive pulmonary disease)     Decreased circulation     Dementia with psychosis     Endometriosis, unspecified     Fracture, femur     right    Fracture, foot     right    Heel fracture     left    Hiatal hernia with GERD     Hypertension     Pulmonary embolism      Respiratory distress     Rheumatoid arthritis, unspecified     Systemic lupus erythematosus, organ or system involvement unspecified     Unspecified osteoarthritis, unspecified site     Unspecified viral hepatitis C without hepatic coma      Past Surgical History:   Procedure Laterality Date    ANTERIOR CERVICAL DISCECTOMY W/ FUSION N/A 03/28/2023    Procedure: DISCECTOMY, SPINE, CERVICAL, ANTERIOR APPROACH, WITH FUSION;  Surgeon: Soto Heller DO;  Location: [facility] OR;  Service: Neurosurgery;  Laterality: N/A;  IOM notified 3/27 JG, C-ARM, MICRO, [employer] notified 3/27 JG    COLONOSCOPY      prior to 2018, normal findings per patient report    EPIDURAL STEROID INJECTION INTO LUMBAR SPINE N/A 5/7/2024    Procedure: Injection-steroid-epidural-lumbar;  Surgeon: Shreyas Christopher MD;  Location: [facility] OR;  Service: Anesthesiology;  Laterality: N/A;  L4-5    EPIDURAL STEROID INJECTION INTO LUMBAR SPINE N/A 7/24/2024    Procedure: Injection-steroid-epidural-lumbar  L5/S1;  Surgeon: Angel Leon MD;  Location: [facility] OR;  Service: Pain Management;  Laterality: N/A;    HYSTERECTOMY      TONSILLECTOMY      UPPER GASTROINTESTINAL ENDOSCOPY      prior to 2018, normal findings per patient report     Family History   Problem Relation Name Age of Onset    Crohn's disease Mother      Colon cancer Neg Hx      Celiac disease Neg Hx      Esophageal cancer Neg Hx      Stomach cancer Neg Hx      Ulcerative colitis Neg Hx       Social History     Tobacco Use    Smoking status: Every Day     Current packs/day: 0.50     Average packs/day: 0.5 packs/day for 55.0 years (27.5 ttl pk-yrs)     Types: Cigarettes    Smokeless tobacco: Never   Substance Use Topics    Alcohol use: Not Currently    Drug use: Not Currently     Types: Hydrocodone, Oxycodone     Wt Readings from Last 10 Encounters:   10/03/24 54.3 kg (119 lb 11.4 oz)   09/16/24 55.7 kg (122 lb 11 oz)   07/24/24 56.2 kg (124 lb)   07/09/24 56.4 kg (124 lb 6.5 oz)   04/29/24 57.2 kg  (126 lb 1.7 oz)   01/19/24 57.2 kg (126 lb 1.7 oz)   08/17/23 56.7 kg (125 lb)   07/19/23 56.9 kg (125 lb 7.1 oz)   06/02/23 57.1 kg (125 lb 14.1 oz)   06/01/23 55.3 kg (121 lb 12.9 oz)     Lab Results   Component Value Date    WBC 12.68 07/17/2024    HGB 15.0 07/17/2024    HCT 45.6 07/17/2024    MCV 93 07/17/2024     07/17/2024     CMP  Sodium   Date Value Ref Range Status   07/12/2023 142 136 - 145 mmol/L Final     Potassium   Date Value Ref Range Status   07/12/2023 4.3 3.5 - 5.1 mmol/L Final     Chloride   Date Value Ref Range Status   07/12/2023 104 95 - 110 mmol/L Final     CO2   Date Value Ref Range Status   07/12/2023 28 23 - 29 mmol/L Final     Glucose   Date Value Ref Range Status   07/12/2023 96 70 - 110 mg/dL Final     BUN   Date Value Ref Range Status   07/12/2023 8 8 - 23 mg/dL Final     Creatinine   Date Value Ref Range Status   07/12/2023 1.0 0.5 - 1.4 mg/dL Final     Calcium   Date Value Ref Range Status   07/12/2023 10.1 8.7 - 10.5 mg/dL Final     Total Protein   Date Value Ref Range Status   07/12/2023 7.8 6.0 - 8.4 g/dL Final     Albumin   Date Value Ref Range Status   07/12/2023 4.1 3.5 - 5.2 g/dL Final     Total Bilirubin   Date Value Ref Range Status   07/12/2023 0.5 0.1 - 1.0 mg/dL Final     Comment:     For infants and newborns, interpretation of results should be based  on gestational age, weight and in agreement with clinical  observations.    Premature Infant recommended reference ranges:  Up to 24 hours.............<8.0 mg/dL  Up to 48 hours............<12.0 mg/dL  3-5 days..................<15.0 mg/dL  6-29 days.................<15.0 mg/dL       Alkaline Phosphatase   Date Value Ref Range Status   07/12/2023 82 55 - 135 U/L Final     AST   Date Value Ref Range Status   07/12/2023 24 10 - 40 U/L Final     ALT   Date Value Ref Range Status   07/12/2023 19 10 - 44 U/L Final     Anion Gap   Date Value Ref Range Status   07/12/2023 10 8 - 16 mmol/L Final       Lab Results   Component  Value Date    TSH 0.861 08/05/2022     Lab Results   Component Value Date    HEPCAB Positive (A) 08/05/2022 6/12/2023 stool studies reviewed (occasional yeast, pH 6.0) & celiac serology WNL    Reviewed some records in media from 2018 which showed colitis from an ER visit that indicated colitis.    Reviewed prior medical records including radiology report of 6/12/2023 CT abdomen pelvis.    Objective:      Physical Exam  Vitals and nursing note reviewed.   Constitutional:       General: She is not in acute distress.     Appearance: Normal appearance. She is well-developed. She is ill-appearing. She is not toxic-appearing or diaphoretic.   HENT:      Mouth/Throat:      Lips: Pink. No lesions.      Mouth: Mucous membranes are moist. No oral lesions.      Tongue: No lesions.      Pharynx: Oropharynx is clear. No pharyngeal swelling or posterior oropharyngeal erythema.   Eyes:      General: No scleral icterus.     Conjunctiva/sclera: Conjunctivae normal.   Pulmonary:      Effort: Pulmonary effort is normal. No respiratory distress.   Abdominal:      General: Bowel sounds are normal. There is no distension.      Palpations: Abdomen is soft. Abdomen is not rigid. There is no mass.      Tenderness: There is generalized abdominal tenderness. There is no guarding or rebound.   Skin:     General: Skin is warm and dry.      Coloration: Skin is not jaundiced or pale.      Findings: No erythema or rash.   Neurological:      Mental Status: She is alert and oriented to person, place, and time.   Psychiatric:         Behavior: Behavior normal.         Thought Content: Thought content normal.         Judgment: Judgment normal.         Assessment:       1. Generalized abdominal pain    2. Irregular bowel habits    3. Chronic diarrhea    4. Tenesmus    5. Family history of Crohn's disease    6. Pharyngoesophageal dysphagia    7. Non-intractable vomiting with nausea    8. Hematemesis of unknown etiology    9. Abnormal CT scan,  gastrointestinal tract    10. Esophageal thickening    11. Gastric wall thickening    12. History of hepatitis C    13. History of shortness of breath    14. History of back pain          Plan:     Discussed with patient and her  that I recommend patient go to the ER for further evaluation and management of her symptoms due to severity of abdominal pain along with nausea, vomiting, and not able to keep much food or fluids down. Concerned for dehydration and patient needs emergent evaluation and management of her symptoms. Discussed with patient & her  that we are an outpatient GI clinic and she needs ER evaluation and management at this time and recommend she follow-up with us once she has been discharged from the ER/hospital. They both verbalized understanding and agreed with management plan. We offered ambulance transportation. Patient and her  both declined. Patient's  reports he will drive her to [facility] now. I updated Dr. Martinez on patient case.    Generalized abdominal pain  - recommended ER evaluation and management at this time and follow-up once discharged for continued outpatient management. Patient and her  both verbalized understanding and agreed with management plan.    Irregular bowel habits, Chronic diarrhea, Tenesmus, & Family history of Crohn's disease  - recommended ER evaluation and management at this time and follow-up once discharged for continued outpatient management. Patient and her  both verbalized understanding and agreed with management plan.  - discussed that colonoscopy would be helpful for evaluation of these symptoms/diagnoses. If not done at the hospital, can schedule at follow-up appointment.    Pharyngoesophageal dysphagia  - recommended ER evaluation and management at this time and follow-up once discharged for continued outpatient management. Patient and her  both verbalized understanding and agreed with management  plan.  - will need EGD to further evaluate these findings. If not done at the hospital, will schedule at follow-up appointment.    Non-intractable vomiting with nausea & Hematemesis of unknown etiology  - recommended ER evaluation and management at this time and follow-up once discharged for continued outpatient management. Patient and her  both verbalized understanding and agreed with management plan.  - will need EGD to further evaluate these findings. If not done at the hospital, will schedule at follow-up appointment.    Abnormal CT scan, gastrointestinal tract: Esophageal thickening & Gastric wall thickening  - will need EGD to further evaluate these findings. If not done at the hospital, will schedule at follow-up appointment.    History of hepatitis C    History of shortness of breath  - follow-up with PCP/pulmonology for continued evaluation and management ASAP  - if experiencing symptoms of headache, chest pain, severe/persistent shortness of breath, dizziness, and/or blurred vision, recommend going to ER for further evaluation and management    History of back pain  Recommend follow-up with Primary Care Provider/ortho for continued evaluation and management.    Follow up in about 1 month (around 11/3/2024), or if symptoms worsen or fail to improve.      If no improvement in symptoms or symptoms worsen, call/follow-up at clinic or go to ER.        57 minutes of total time spent on the encounter, which includes face to face time and non-face to face time preparing to see the patient (e.g., review of tests), Obtaining and/or reviewing separately obtained history, Documenting clinical information in the electronic or other health record, Independently interpreting results (not separately reported) and communicating results to the patient/family/caregiver, or Care coordination (not separately reported).

## 2024-10-04 ENCOUNTER — TELEPHONE (OUTPATIENT)
Dept: PAIN MEDICINE | Facility: CLINIC | Age: 69
End: 2024-10-04
Payer: MEDICARE

## 2024-10-04 PROBLEM — I42.9 CARDIOMYOPATHY: Status: ACTIVE | Noted: 2024-10-04

## 2024-10-04 NOTE — TELEPHONE ENCOUNTER
----- Message from Ayana sent at 10/4/2024  9:42 AM CDT -----  Contact:  Lito  Type:  Sooner Appointment Request    Caller is requesting a sooner appointment.  Caller declined first available appointment listed below.  Caller will not accept being placed on the waitlist and is requesting a message be sent to doctor.    Name of Caller:  /Lito  When is the first available appointment?  10/28  Symptoms:  pain/currently in hospital  Would the patient rather a call back or a response via [facility]? call  Best Call Back Number:  [phone]  Additional Information:  wife is in the hospital for the pain, please call for a sooner appt.

## 2024-10-06 PROBLEM — E87.6 HYPOKALEMIA: Status: ACTIVE | Noted: 2024-10-06

## 2024-10-07 PROBLEM — K58.9 IBS (IRRITABLE BOWEL SYNDROME): Status: ACTIVE | Noted: 2023-03-28

## 2024-10-14 ENCOUNTER — TELEPHONE (OUTPATIENT)
Dept: PAIN MEDICINE | Facility: CLINIC | Age: 69
End: 2024-10-14

## 2024-10-14 ENCOUNTER — LAB VISIT (OUTPATIENT)
Dept: LAB | Facility: HOSPITAL | Age: 69
End: 2024-10-14
Payer: MEDICARE

## 2024-10-14 ENCOUNTER — OFFICE VISIT (OUTPATIENT)
Dept: FAMILY MEDICINE | Facility: CLINIC | Age: 69
End: 2024-10-14
Payer: MEDICARE

## 2024-10-14 ENCOUNTER — OFFICE VISIT (OUTPATIENT)
Dept: PAIN MEDICINE | Facility: CLINIC | Age: 69
End: 2024-10-14
Payer: MEDICARE

## 2024-10-14 VITALS
DIASTOLIC BLOOD PRESSURE: 72 MMHG | DIASTOLIC BLOOD PRESSURE: 72 MMHG | WEIGHT: 124.69 LBS | BODY MASS INDEX: 22.35 KG/M2 | SYSTOLIC BLOOD PRESSURE: 133 MMHG | SYSTOLIC BLOOD PRESSURE: 132 MMHG | HEART RATE: 81 BPM | WEIGHT: 126.13 LBS | TEMPERATURE: 98 F | HEIGHT: 63 IN | OXYGEN SATURATION: 98 % | HEART RATE: 78 BPM | HEIGHT: 63 IN | BODY MASS INDEX: 22.09 KG/M2

## 2024-10-14 DIAGNOSIS — M54.9 DORSALGIA: ICD-10-CM

## 2024-10-14 DIAGNOSIS — K58.0 IRRITABLE BOWEL SYNDROME WITH DIARRHEA: ICD-10-CM

## 2024-10-14 DIAGNOSIS — F02.A4 MILD DEMENTIA ASSOCIATED WITH OTHER UNDERLYING DISEASE, WITH ANXIETY: ICD-10-CM

## 2024-10-14 DIAGNOSIS — J44.9 CHRONIC OBSTRUCTIVE PULMONARY DISEASE, UNSPECIFIED COPD TYPE: ICD-10-CM

## 2024-10-14 DIAGNOSIS — Z72.0 NICOTINE ABUSE: ICD-10-CM

## 2024-10-14 DIAGNOSIS — I10 ESSENTIAL HYPERTENSION: ICD-10-CM

## 2024-10-14 DIAGNOSIS — M47.816 LUMBAR SPONDYLOSIS: Primary | ICD-10-CM

## 2024-10-14 DIAGNOSIS — I42.8 NICM (NONISCHEMIC CARDIOMYOPATHY): ICD-10-CM

## 2024-10-14 DIAGNOSIS — Z87.891 PERSONAL HISTORY OF NICOTINE DEPENDENCE: ICD-10-CM

## 2024-10-14 DIAGNOSIS — I42.8 NICM (NONISCHEMIC CARDIOMYOPATHY): Primary | ICD-10-CM

## 2024-10-14 DIAGNOSIS — I25.10 NONOBSTRUCTIVE ATHEROSCLEROSIS OF CORONARY ARTERY: ICD-10-CM

## 2024-10-14 LAB
ANION GAP SERPL CALC-SCNC: 6 MMOL/L (ref 8–16)
BUN SERPL-MCNC: 10 MG/DL (ref 8–23)
CALCIUM SERPL-MCNC: 10 MG/DL (ref 8.7–10.5)
CHLORIDE SERPL-SCNC: 103 MMOL/L (ref 95–110)
CO2 SERPL-SCNC: 29 MMOL/L (ref 23–29)
CREAT SERPL-MCNC: 1.2 MG/DL (ref 0.5–1.4)
EST. GFR  (NO RACE VARIABLE): 49.3 ML/MIN/1.73 M^2
GLUCOSE SERPL-MCNC: 101 MG/DL (ref 70–110)
POTASSIUM SERPL-SCNC: 4.8 MMOL/L (ref 3.5–5.1)
SODIUM SERPL-SCNC: 138 MMOL/L (ref 136–145)

## 2024-10-14 PROCEDURE — 99215 OFFICE O/P EST HI 40 MIN: CPT | Mod: PBBFAC,27,PO | Performed by: FAMILY MEDICINE

## 2024-10-14 PROCEDURE — 99999 PR PBB SHADOW E&M-EST. PATIENT-LVL V: CPT | Mod: PBBFAC,,, | Performed by: FAMILY MEDICINE

## 2024-10-14 PROCEDURE — 80048 BASIC METABOLIC PNL TOTAL CA: CPT

## 2024-10-14 PROCEDURE — 99999 PR PBB SHADOW E&M-EST. PATIENT-LVL IV: CPT | Mod: PBBFAC,,, | Performed by: ANESTHESIOLOGY

## 2024-10-14 PROCEDURE — 36415 COLL VENOUS BLD VENIPUNCTURE: CPT | Mod: PO

## 2024-10-14 PROCEDURE — 99214 OFFICE O/P EST MOD 30 MIN: CPT | Mod: S$PBB,,, | Performed by: ANESTHESIOLOGY

## 2024-10-14 PROCEDURE — 99214 OFFICE O/P EST MOD 30 MIN: CPT | Mod: PBBFAC,PO | Performed by: ANESTHESIOLOGY

## 2024-10-14 NOTE — H&P (VIEW-ONLY)
[facility] Follow Up Evaluation      Referred by: No ref. provider found    PCP:     CC:   Chief Complaint   Patient presents with    Low-back Pain          10/14/2024     2:10 PM 9/16/2024     2:39 PM 7/9/2024     9:25 AM   Last 3 PDI Scores   Pain Disability Index (PDI) 43 27 45       Interval HPI 9/16/24: Ms. Barone returns to the office for follow up.  Today she reports she continues to have axial lower back pain.  She reports she has started taking naltrexone.  She says she had abdominal pain, nausea, vomiting coming body aches following taking this medication.  She denies taking any opioid medications.  She went to the hospital and has been treated for intravascular volume depletion and is currently wearing an external defibrillator.  Today she reports she continues to have axial lower back pain, 8/10, constant, aching.      HPI:   Nichelle Barone is a 69 y.o. female patient who has a past medical history of Arthritis, Cancer, Coma, COPD (chronic obstructive pulmonary disease), Decreased circulation, Dementia with psychosis, Endometriosis, unspecified, Fracture, femur, Fracture, foot, Heel fracture, Hiatal hernia with GERD, Hypertension, Pulmonary embolism, Respiratory distress, Rheumatoid arthritis, unspecified, Systemic lupus erythematosus, organ or system involvement unspecified, Unspecified osteoarthritis, unspecified site, and Unspecified viral hepatitis C without hepatic coma. She presents with back pain.  She has had chronic lower back pain for more than a year.  She is with her  today who is participating in the history.  She reports her pain is 9 in 10, constant, aching, burning, throbbing, tight, tingling, deep, sharp, shooting radiating from the lower back down both of her legs.  Pain is worse with sitting, standing, lying, bending, coughing, lifting, walking relieved with lying down and medications.  She gets some mild relief with the muscle relaxer.  She is failed to find relief  in the past with gabapentin and Lyrica.       Pain Intervention History:  - s/p L4/5 STELLA on 5/7/24  - s/p L5/S1 STELLA on 7/24/24    Past Spine Surgical History:      Past and current medications:  Antineuropathics: gabapentin, lyrica  NSAIDs:  Physical therapy: yes, completed   Antidepressants:  Muscle relaxers: tizanidine, robaxin   Opioids:  Antiplatelets/Anticoagulants:    History:    Current Outpatient Medications:     albuterol-ipratropium (DUO-NEB) 2.5 mg-0.5 mg/3 mL nebulizer solution, Take 3 mLs by nebulization every 8 (eight) hours as needed for Wheezing or Shortness of Breath (Cough). Rescue, Disp: 75 mL, Rfl: 0    aspirin (ECOTRIN) 81 MG EC tablet, Take 1 tablet (81 mg total) by mouth once daily., Disp: 90 tablet, Rfl: 3    atorvastatin (LIPITOR) 40 MG tablet, Take 1 tablet (40 mg total) by mouth every evening., Disp: 90 tablet, Rfl: 3    chol/gl/ser/RNA/phen/prg/hb150 (SHARPER FOCUS ORAL), Take 1 capsule by mouth 2 (two) times a day., Disp: , Rfl:     docusate sodium (COLACE) 100 MG capsule, Take 100 mg by mouth 3 (three) times daily as needed for Constipation., Disp: , Rfl:     enzymes,digestive (DIGESTIVE ENZYMES ORAL), Take 1 tablet by mouth 2 (two) times a day., Disp: , Rfl:     ergocalciferol (ERGOCALCIFEROL) 50,000 unit Cap, Take 50,000 Units by mouth every Tuesday., Disp: , Rfl:     furosemide (LASIX) 20 MG tablet, Take 1 tablet (20 mg total) by mouth daily as needed., Disp: 20 tablet, Rfl: 0    LYCOPENE ORAL, Take 1 capsule by mouth once daily., Disp: , Rfl:     magnesium gluconate 27.5 mg magne- sium (500 mg) Tab, Take 1 tablet by mouth every evening., Disp: , Rfl:     methocarbamoL (ROBAXIN) 750 MG Tab, TAKE 1 TABLET BY MOUTH 3 TIMES A DAY, Disp: 180 tablet, Rfl: 0    metoprolol succinate (TOPROL-XL) 25 MG 24 hr tablet, Take 1 tablet (25 mg total) by mouth 2 (two) times daily., Disp: 180 tablet, Rfl: 3    nicotine (NICODERM CQ) 14 mg/24 hr, Place 1 patch onto the skin once daily., Disp: 30 patch,  Rfl: 0    NON FORMULARY MEDICATION, Take 1 capsule by mouth once daily. **MYRICA SUPPLEMENT**, Disp: , Rfl:     NON FORMULARY MEDICATION, Take 30 mg by mouth once daily. **LITHIUM ORATATE 30MG**, Disp: , Rfl:     OLANZapine (ZYPREXA) 5 MG tablet, Take 1 tablet (5 mg total) by mouth every evening., Disp: 90 tablet, Rfl: 3    pantoprazole (PROTONIX) 40 MG tablet, Take 1 tablet (40 mg total) by mouth 2 (two) times daily., Disp: 60 tablet, Rfl: 0    polyethylene glycol (GLYCOLAX) 17 gram PwPk, Take 8.5 g by mouth once daily., Disp: 10 packet, Rfl: 0    sacubitriL-valsartan (ENTRESTO) 24-26 mg per tablet, Take 1 tablet by mouth 2 (two) times daily., Disp: 180 tablet, Rfl: 3    spironolactone 5 mg/mL Susp, Take 2.5 mLs (12.5 mg total) by mouth once daily., Disp: 225 mL, Rfl: 3    tiotropium-olodateroL (STIOLTO RESPIMAT) 2.5-2.5 mcg/actuation Mist, Inhale 2 puffs into the lungs once daily. Controller, Disp: 4 g, Rfl: 11  No current facility-administered medications for this visit.    Past Medical History:   Diagnosis Date    Arthritis     Cancer     not sure    Coma 1990    ? after medication  x11 days    COPD (chronic obstructive pulmonary disease)     Decreased circulation     Dementia with psychosis     Endometriosis, unspecified     Fracture, femur     right    Fracture, foot     right    Heel fracture     left    Hiatal hernia with GERD     Hypertension     Pulmonary embolism     Respiratory distress     Rheumatoid arthritis, unspecified     Systemic lupus erythematosus, organ or system involvement unspecified     Unspecified osteoarthritis, unspecified site     Unspecified viral hepatitis C without hepatic coma        Past Surgical History:   Procedure Laterality Date    ANGIOGRAM, CORONARY, WITH LEFT HEART CATHETERIZATION  10/4/2024    Procedure: Left Heart Cath Rm 3605;  Surgeon: Artemio Byers MD;  Location: [facility];  Service: Cardiovascular;;    ANTERIOR CERVICAL DISCECTOMY W/ FUSION N/A 03/28/2023     Procedure: DISCECTOMY, SPINE, CERVICAL, ANTERIOR APPROACH, WITH FUSION;  Surgeon: Soto Heller DO;  Location: [facility] OR;  Service: Neurosurgery;  Laterality: N/A;  IOM notified 3/27 JG, C-ARM, MICRO, [employer] notified 3/27 JG    COLONOSCOPY      prior to 2018, normal findings per patient report    EPIDURAL STEROID INJECTION INTO LUMBAR SPINE N/A 5/7/2024    Procedure: Injection-steroid-epidural-lumbar;  Surgeon: Shreyas Christopher MD;  Location: [facility];  Service: Anesthesiology;  Laterality: N/A;  L4-5    EPIDURAL STEROID INJECTION INTO LUMBAR SPINE N/A 7/24/2024    Procedure: Injection-steroid-epidural-lumbar  L5/S1;  Surgeon: Angel Leon MD;  Location: [facility] OR;  Service: Pain Management;  Laterality: N/A;    HYSTERECTOMY      TONSILLECTOMY      UPPER GASTROINTESTINAL ENDOSCOPY      prior to 2018, normal findings per patient report       Family History   Problem Relation Name Age of Onset    Crohn's disease Mother      Colon cancer Neg Hx      Celiac disease Neg Hx      Esophageal cancer Neg Hx      Stomach cancer Neg Hx      Ulcerative colitis Neg Hx         Social History     Socioeconomic History    Marital status:    Tobacco Use    Smoking status: Every Day     Current packs/day: 0.50     Average packs/day: 0.5 packs/day for 55.0 years (27.5 ttl pk-yrs)     Types: Cigarettes    Smokeless tobacco: Never   Substance and Sexual Activity    Alcohol use: Not Currently    Drug use: Not Currently     Types: Hydrocodone, Oxycodone     Social Drivers of Health     Financial Resource Strain: Patient Unable To Answer (10/4/2024)    Overall Financial Resource Strain (CARDIA)     Difficulty of Paying Living Expenses: Patient unable to answer   Food Insecurity: Patient Unable To Answer (10/4/2024)    Hunger Vital Sign     Worried About Running Out of Food in the Last Year: Patient unable to answer     Ran Out of Food in the Last Year: Patient unable to answer   Transportation Needs: Patient Unable To Answer  "(10/4/2024)    TRANSPORTATION NEEDS     Transportation : Patient unable to answer   Stress: Patient Unable To Answer (10/4/2024)    Citizen of [place] of [employer] - Occupational Stress Questionnaire     Feeling of Stress : Patient unable to answer   Housing Stability: Patient Unable To Answer (10/4/2024)    Housing Stability Vital Sign     Unable to Pay for Housing in the Last Year: Patient unable to answer     Homeless in the Last Year: Patient unable to answer       Review of patient's allergies indicates:   Allergen Reactions    Demerol [meperidine] Nausea And Vomiting    Penicillins Nausea And Vomiting and Rash    Plaquenil [hydroxychloroquine] Rash    Sulfa (sulfonamide antibiotics) Rash and Blisters    Bactrim [sulfamethoxazole-trimethoprim]      Not work for infection    Chantix [varenicline] Other (See Comments)     Tongue bleed,  and bleeding ulcer    Iodinated contrast media Itching     No swelling or rash; did good with allergy protocol    Keflex [cephalexin] Other (See Comments)     Works for day/or two, than infection back     Nicotine Dermatitis     Patch     Wellbutrin [bupropion hcl] Other (See Comments)     Tongue bleed, bleeding ulcer        Review of Systems:  Positive for chills, fatigue, rash, itching, vision change, eye pain, ear pain, wheezing, cough, constipation, diarrhea, abdominal pain, nausea, urinary urgency, urinary frequency, joint stiffness, joint swelling, back pain, memory loss, dizziness, difficulty sleeping, anxiety, depression.  Balance of review of systems is negative.    Physical Exam:  Vitals:    10/14/24 1411   BP: 133/72   Pulse: 81   Weight: 56.5 kg (124 lb 10.7 oz)   Height: 5' 3" (1.6 m)   PainSc:   8   PainLoc: Back     Body mass index is 22.08 kg/m².    Gen: NAD  Psych: mood appropriate for given condition  HEENT: eyes anicteric   CV: RRR  HEENT: anicteric   Respiratory: non-labored, no signs of respiratory distress  Abd: non-distended  Skin: warm, dry and " "intact.  Gait: No antalgic gait.     Reproducible pain with lumbar axial facet loading    Sensory:  Intact and symmetrical to light touch in L1-S1 dermatomes bilaterally.    Motor:     Right Left   L2/3 Iliacus Hip flexion  5  5   L3/4 Qudratus Femoris Knee Extension  5  5   L4/5 Tib Anterior Ankle Dorsiflexion   5  5   L5/S1 Extensor Hallicus Longus Great toe extension  5  5   S1/S2 Gastroc/Soleus Plantar Flexion  5  5       Labs:  No results found for: "LABA1C", "HGBA1C"    Lab Results   Component Value Date    WBC 12.10 10/07/2024    HGB 14.1 10/07/2024    HCT 41.1 10/07/2024    MCV 89 10/07/2024     10/07/2024           Imaging:  MRI lumbar spine 3/1/24  FINDINGS:  Vertebral column: The vertebral bodies maintain normal height.  As seen on comparison plain films, there is trace anterolisthesis of L4 on L5.  Alignment is otherwise normal.  The disc spaces are preserved.  Baseline marrow signal is normal.     Spinal canal, conus, epidural space: The spinal canal is developmentally normal.  The conus terminates at the level of L1-2 and is normal in contour and signal intensity.  There is no abnormal epidural soft tissue mass or fluid collection.     Findings by level:     On the sagittal images, the T10-11 and T11-12 levels are normal.  T12-L1: Unremarkable  L1-2: There is a minimal disc bulge and mild facet joint arthropathy.  There is no spinal canal or significant foraminal stenosis.  L2-3: There is a minimal disc bulge.  There is right greater than left facet joint arthropathy.  There is no spinal canal or significant foraminal stenosis.  L3-4: There is a diffuse disc bulge.  There is facet joint arthropathy with ligamentum flavum thickening.  There is borderline to mild spinal stenosis with mild bilateral foraminal stenosis.  L4-5: There is trace anterolisthesis of L4 on L5.  There is a moderate diffuse disc bulge.  There is moderate to marked facet joint arthropathy with ligamentum flavum thickening.  " There is moderate to severe spinal canal and bilateral lateral recess stenosis with moderate right and mild left foraminal stenosis.  L5-S1: There is bilateral facet joint arthropathy.  There is a minimal disc bulge.  There is no spinal canal or significant foraminal stenosis.     Soft tissues, other: The prevertebral soft tissues are normal.  The aorta is normal in caliber.  There is no hydronephrosis.    Xray lumbar spine 7/9/24  FINDINGS:  Vertebral bodies are normal in height without evidence of fracture.  Slight levoconvex curvature of the lumbar spine.  Lumbar lordosis is maintained.  Stable mild grade 1 anterolisthesis of L4 on L5. No abnormal translation with flexion and extension.  Multilevel mild intervertebral disc space narrowing and anterior marginal osteophyte formation.  Moderate multilevel facet arthropathy.  Aortoiliac calcific atherosclerosis.    Assessment:   Problem List Items Addressed This Visit    None  Visit Diagnoses       Lumbar spondylosis    -  Primary    Dorsalgia                    Nicehlle Barone is a 69 y.o. female patient who has a past medical history of Arthritis, Cancer, Coma, COPD (chronic obstructive pulmonary disease), Decreased circulation, Dementia with psychosis, Endometriosis, unspecified, Fracture, femur, Fracture, foot, Heel fracture, Hiatal hernia with GERD, Hypertension, Pulmonary embolism, Respiratory distress, Rheumatoid arthritis, unspecified, Systemic lupus erythematosus, organ or system involvement unspecified, Unspecified osteoarthritis, unspecified site, and Unspecified viral hepatitis C without hepatic coma. She presents with back pain.  She has had chronic lower back pain for more than a year.  She is with her  today who is participating in the history.  She reports her pain is 9 in 10, constant, aching, burning, throbbing, tight, tingling, deep, sharp, shooting radiating from the lower back down both of her legs.  Pain is worse with sitting, standing,  lying, bending, coughing, lifting, walking relieved with lying down and medications.  She gets some mild relief with the muscle relaxer.  She is failed to find relief in the past with gabapentin and Lyrica.       10/14/24 - Ms. Barone returns to the office for follow up.  Today she reports she continues to have axial lower back pain.  She reports she has started taking naltrexone.  She says she had abdominal pain, nausea, vomiting coming body aches following taking this medication.  She denies taking any opioid medications.  She went to the hospital and has been treated for intravascular volume depletion and is currently wearing an external defibrillator.  Today she reports she continues to have axial lower back pain, 8/10, constant, aching.    - on exam she has reproducible pain with lumbar axial facet loading  - I independently reviewed her lumbar MRI again with her today.  She has moderate-to-severe central canal and at L4-5.  She has significant multilevel bilateral facet arthropathy  - at this point she has failed to find relief with L4-5 and L5-S1 lumbar STELLA  - she has completed formal physical therapy in the past and she has tried to maintain PT directed home exercise program.  Unfortunately sometimes her pain is so severe she does not feel like she can walk down to where there is a pool to participate in some home aquatic therapy  - she has failed both gabapentin and Lyrica in the past with the neuropathic component of her pain.  She reports she has a known diagnosis of fibromyalgia for over the past 50 years.  It is unclear if naltrexone cause the symptoms she was experiencing however it is clear that the naltrexone did not improve her pain  - we will schedule for bilateral L4-5 and L5-S1 facet joint injections.  The risks and benefits of this intervention, and alternative therapies were discussed with the patient.  The discussion of risks included infection, bleeding, need for additional procedures or  surgery, nerve damage.  Questions regarding the procedure, risks, expected outcome, and possible side effects were solicited and answered to the patient's satisfaction.  Nichelle Barone wishes to proceed with the injection or procedure.  Written consent was obtained.  - follow up 3-4 weeks post injection      : Not applicable    Angel Leon M.D.  Interventional Pain Medicine / Anesthesiology    This note was completed with dictation software and grammatical errors may exist.

## 2024-10-14 NOTE — PROGRESS NOTES
[facility] Follow Up Evaluation      Referred by: No ref. provider found    PCP:     CC:   Chief Complaint   Patient presents with    Low-back Pain          10/14/2024     2:10 PM 9/16/2024     2:39 PM 7/9/2024     9:25 AM   Last 3 PDI Scores   Pain Disability Index (PDI) 43 [phone] 45       Interval HPI 9/16/24: Ms. Barone returns to the office for follow up.  Today she reports she continues to have axial lower back pain.  She reports she has started taking naltrexone.  She says she had abdominal pain, nausea, vomiting coming body aches following taking this medication.  She denies taking any opioid medications.  She went to the hospital and has been treated for intravascular volume depletion and is currently wearing an external defibrillator.  Today she reports she continues to have axial lower back pain, 8/10, constant, aching.      HPI:   Nichelle Barone is a 69 y.o. female patient who has a past medical history of Arthritis, Cancer, Coma, COPD (chronic obstructive pulmonary disease), Decreased circulation, Dementia with psychosis, Endometriosis, unspecified, Fracture, femur, Fracture, foot, Heel fracture, Hiatal hernia with GERD, Hypertension, Pulmonary embolism, Respiratory distress, Rheumatoid arthritis, unspecified, Systemic lupus erythematosus, organ or system involvement unspecified, Unspecified osteoarthritis, unspecified site, and Unspecified viral hepatitis C without hepatic coma. She presents with back pain.  She has had chronic lower back pain for more than a year.  She is with her  today who is participating in the history.  She reports her pain is 9 in 10, constant, aching, burning, throbbing, tight, tingling, deep, sharp, shooting radiating from the lower back down both of her legs.  Pain is worse with sitting, standing, lying, bending, coughing, lifting, walking relieved with lying down and medications.  She gets some mild relief with the muscle relaxer.  She is failed to find relief  in the past with gabapentin and Lyrica.       Pain Intervention History:  - s/p L4/5 STELLA on 5/7/24  - s/p L5/S1 STELLA on 7/24/24    Past Spine Surgical History:      Past and current medications:  Antineuropathics: gabapentin, lyrica  NSAIDs:  Physical therapy: yes, completed   Antidepressants:  Muscle relaxers: tizanidine, robaxin   Opioids:  Antiplatelets/Anticoagulants:    History:    Current Outpatient Medications:     albuterol-ipratropium (DUO-NEB) 2.5 mg-0.5 mg/3 mL nebulizer solution, Take 3 mLs by nebulization every 8 (eight) hours as needed for Wheezing or Shortness of Breath (Cough). Rescue, Disp: 75 mL, Rfl: 0    aspirin (ECOTRIN) 81 MG EC tablet, Take 1 tablet (81 mg total) by mouth once daily., Disp: 90 tablet, Rfl: 3    atorvastatin (LIPITOR) 40 MG tablet, Take 1 tablet (40 mg total) by mouth every evening., Disp: 90 tablet, Rfl: 3    chol/gl/ser/RNA/phen/prg/hb150 (SHARPER FOCUS ORAL), Take 1 capsule by mouth 2 (two) times a day., Disp: , Rfl:     docusate sodium (COLACE) 100 MG capsule, Take 100 mg by mouth 3 (three) times daily as needed for Constipation., Disp: , Rfl:     enzymes,digestive (DIGESTIVE ENZYMES ORAL), Take 1 tablet by mouth 2 (two) times a day., Disp: , Rfl:     ergocalciferol (ERGOCALCIFEROL) 50,000 unit Cap, Take 50,000 Units by mouth every Tuesday., Disp: , Rfl:     furosemide (LASIX) 20 MG tablet, Take 1 tablet (20 mg total) by mouth daily as needed., Disp: 20 tablet, Rfl: 0    LYCOPENE ORAL, Take 1 capsule by mouth once daily., Disp: , Rfl:     magnesium gluconate 27.5 mg magne- sium (500 mg) Tab, Take 1 tablet by mouth every evening., Disp: , Rfl:     methocarbamoL (ROBAXIN) 750 MG Tab, TAKE 1 TABLET BY MOUTH 3 TIMES A DAY, Disp: 180 tablet, Rfl: 0    metoprolol succinate (TOPROL-XL) 25 MG 24 hr tablet, Take 1 tablet (25 mg total) by mouth 2 (two) times daily., Disp: 180 tablet, Rfl: 3    nicotine (NICODERM CQ) 14 mg/24 hr, Place 1 patch onto the skin once daily., Disp: 30 patch,  Rfl: 0    NON FORMULARY MEDICATION, Take 1 capsule by mouth once daily. **MYRICA SUPPLEMENT**, Disp: , Rfl:     NON FORMULARY MEDICATION, Take 30 mg by mouth once daily. **LITHIUM ORATATE 30MG**, Disp: , Rfl:     OLANZapine (ZYPREXA) 5 MG tablet, Take 1 tablet (5 mg total) by mouth every evening., Disp: 90 tablet, Rfl: 3    pantoprazole (PROTONIX) 40 MG tablet, Take 1 tablet (40 mg total) by mouth 2 (two) times daily., Disp: 60 tablet, Rfl: 0    polyethylene glycol (GLYCOLAX) 17 gram PwPk, Take 8.5 g by mouth once daily., Disp: 10 packet, Rfl: 0    sacubitriL-valsartan (ENTRESTO) 24-26 mg per tablet, Take 1 tablet by mouth 2 (two) times daily., Disp: 180 tablet, Rfl: 3    spironolactone 5 mg/mL Susp, Take 2.5 mLs (12.5 mg total) by mouth once daily., Disp: 225 mL, Rfl: 3    tiotropium-olodateroL (STIOLTO RESPIMAT) 2.5-2.5 mcg/actuation Mist, Inhale 2 puffs into the lungs once daily. Controller, Disp: 4 g, Rfl: 11  No current facility-administered medications for this visit.    Past Medical History:   Diagnosis Date    Arthritis     Cancer     not sure    Coma 1990    ? after medication  x11 days    COPD (chronic obstructive pulmonary disease)     Decreased circulation     Dementia with psychosis     Endometriosis, unspecified     Fracture, femur     right    Fracture, foot     right    Heel fracture     left    Hiatal hernia with GERD     Hypertension     Pulmonary embolism     Respiratory distress     Rheumatoid arthritis, unspecified     Systemic lupus erythematosus, organ or system involvement unspecified     Unspecified osteoarthritis, unspecified site     Unspecified viral hepatitis C without hepatic coma        Past Surgical History:   Procedure Laterality Date    ANGIOGRAM, CORONARY, WITH LEFT HEART CATHETERIZATION  10/4/2024    Procedure: Left Heart Cath Rm 3605;  Surgeon: Artemio Byers MD;  Location: [facility];  Service: Cardiovascular;;    ANTERIOR CERVICAL DISCECTOMY W/ FUSION N/A 03/28/2023     Procedure: DISCECTOMY, SPINE, CERVICAL, ANTERIOR APPROACH, WITH FUSION;  Surgeon: Soto Heller DO;  Location: [facility] OR;  Service: Neurosurgery;  Laterality: N/A;  IOM notified 3/27 JG, C-ARM, MICRO, [employer] notified 3/27 JG    COLONOSCOPY      prior to 2018, normal findings per patient report    EPIDURAL STEROID INJECTION INTO LUMBAR SPINE N/A 5/7/2024    Procedure: Injection-steroid-epidural-lumbar;  Surgeon: Shreyas Christopher MD;  Location: [facility];  Service: Anesthesiology;  Laterality: N/A;  L4-5    EPIDURAL STEROID INJECTION INTO LUMBAR SPINE N/A 7/24/2024    Procedure: Injection-steroid-epidural-lumbar  L5/S1;  Surgeon: Angel Leon MD;  Location: [facility] OR;  Service: Pain Management;  Laterality: N/A;    HYSTERECTOMY      TONSILLECTOMY      UPPER GASTROINTESTINAL ENDOSCOPY      prior to 2018, normal findings per patient report       Family History   Problem Relation Name Age of Onset    Crohn's disease Mother      Colon cancer Neg Hx      Celiac disease Neg Hx      Esophageal cancer Neg Hx      Stomach cancer Neg Hx      Ulcerative colitis Neg Hx         Social History     Socioeconomic History    Marital status:    Tobacco Use    Smoking status: Every Day     Current packs/day: 0.50     Average packs/day: 0.5 packs/day for 55.0 years (27.5 ttl pk-yrs)     Types: Cigarettes    Smokeless tobacco: Never   Substance and Sexual Activity    Alcohol use: Not Currently    Drug use: Not Currently     Types: Hydrocodone, Oxycodone     Social Drivers of Health     Financial Resource Strain: Patient Unable To Answer (10/4/2024)    Overall Financial Resource Strain (CARDIA)     Difficulty of Paying Living Expenses: Patient unable to answer   Food Insecurity: Patient Unable To Answer (10/4/2024)    Hunger Vital Sign     Worried About Running Out of Food in the Last Year: Patient unable to answer     Ran Out of Food in the Last Year: Patient unable to answer   Transportation Needs: Patient Unable To Answer  "(10/4/2024)    TRANSPORTATION NEEDS     Transportation : Patient unable to answer   Stress: Patient Unable To Answer (10/4/2024)    [employer] - Occupational Stress Questionnaire     Feeling of Stress : Patient unable to answer   Housing Stability: Patient Unable To Answer (10/4/2024)    Housing Stability Vital Sign     Unable to Pay for Housing in the Last Year: Patient unable to answer     Homeless in the Last Year: Patient unable to answer       Review of patient's allergies indicates:   Allergen Reactions    Demerol [meperidine] Nausea And Vomiting    Penicillins Nausea And Vomiting and Rash    Plaquenil [hydroxychloroquine] Rash    Sulfa (sulfonamide antibiotics) Rash and Blisters    Bactrim [sulfamethoxazole-trimethoprim]      Not work for infection    Chantix [varenicline] Other (See Comments)     Tongue bleed,  and bleeding ulcer    Iodinated contrast media Itching     No swelling or rash; did good with allergy protocol    Keflex [cephalexin] Other (See Comments)     Works for day/or two, than infection back     Nicotine Dermatitis     Patch     Wellbutrin [bupropion hcl] Other (See Comments)     Tongue bleed, bleeding ulcer        Review of Systems:  Positive for chills, fatigue, rash, itching, vision change, eye pain, ear pain, wheezing, cough, constipation, diarrhea, abdominal pain, nausea, urinary urgency, urinary frequency, joint stiffness, joint swelling, back pain, memory loss, dizziness, difficulty sleeping, anxiety, depression.  Balance of review of systems is negative.    Physical Exam:  Vitals:    10/14/24 1411   BP: 133/72   Pulse: 81   Weight: 56.5 kg (124 lb 10.7 oz)   Height: 5' 3" (1.6 m)   PainSc:   8   PainLoc: Back     Body mass index is 22.08 kg/m².    Gen: NAD  Psych: mood appropriate for given condition  HEENT: eyes anicteric   CV: RRR  HEENT: anicteric   Respiratory: non-labored, no signs of respiratory distress  Abd: non-distended  Skin: warm, dry and " "intact.  Gait: No antalgic gait.     Reproducible pain with lumbar axial facet loading    Sensory:  Intact and symmetrical to light touch in L1-S1 dermatomes bilaterally.    Motor:     Right Left   L2/3 Iliacus Hip flexion  5  5   L3/4 Qudratus Femoris Knee Extension  5  5   L4/5 Tib Anterior Ankle Dorsiflexion   5  5   L5/S1 Extensor Hallicus Longus Great toe extension  5  5   S1/S2 Gastroc/Soleus Plantar Flexion  5  5       Labs:  No results found for: "LABA1C", "HGBA1C"    Lab Results   Component Value Date    WBC 12.10 10/07/2024    HGB 14.1 10/07/2024    HCT 41.1 10/07/2024    MCV 89 10/07/2024     10/07/2024           Imaging:  MRI lumbar spine 3/1/24  FINDINGS:  Vertebral column: The vertebral bodies maintain normal height.  As seen on comparison plain films, there is trace anterolisthesis of L4 on L5.  Alignment is otherwise normal.  The disc spaces are preserved.  Baseline marrow signal is normal.     Spinal canal, conus, epidural space: The spinal canal is developmentally normal.  The conus terminates at the level of L1-2 and is normal in contour and signal intensity.  There is no abnormal epidural soft tissue mass or fluid collection.     Findings by level:     On the sagittal images, the T10-11 and T11-12 levels are normal.  T12-L1: Unremarkable  L1-2: There is a minimal disc bulge and mild facet joint arthropathy.  There is no spinal canal or significant foraminal stenosis.  L2-3: There is a minimal disc bulge.  There is right greater than left facet joint arthropathy.  There is no spinal canal or significant foraminal stenosis.  L3-4: There is a diffuse disc bulge.  There is facet joint arthropathy with ligamentum flavum thickening.  There is borderline to mild spinal stenosis with mild bilateral foraminal stenosis.  L4-5: There is trace anterolisthesis of L4 on L5.  There is a moderate diffuse disc bulge.  There is moderate to marked facet joint arthropathy with ligamentum flavum thickening.  " There is moderate to severe spinal canal and bilateral lateral recess stenosis with moderate right and mild left foraminal stenosis.  L5-S1: There is bilateral facet joint arthropathy.  There is a minimal disc bulge.  There is no spinal canal or significant foraminal stenosis.     Soft tissues, other: The prevertebral soft tissues are normal.  The aorta is normal in caliber.  There is no hydronephrosis.    Xray lumbar spine 7/9/24  FINDINGS:  Vertebral bodies are normal in height without evidence of fracture.  Slight levoconvex curvature of the lumbar spine.  Lumbar lordosis is maintained.  Stable mild grade 1 anterolisthesis of L4 on L5. No abnormal translation with flexion and extension.  Multilevel mild intervertebral disc space narrowing and anterior marginal osteophyte formation.  Moderate multilevel facet arthropathy.  Aortoiliac calcific atherosclerosis.    Assessment:   Problem List Items Addressed This Visit    None  Visit Diagnoses       Lumbar spondylosis    -  Primary    Dorsalgia                    Nichelle Barone is a 69 y.o. female patient who has a past medical history of Arthritis, Cancer, Coma, COPD (chronic obstructive pulmonary disease), Decreased circulation, Dementia with psychosis, Endometriosis, unspecified, Fracture, femur, Fracture, foot, Heel fracture, Hiatal hernia with GERD, Hypertension, Pulmonary embolism, Respiratory distress, Rheumatoid arthritis, unspecified, Systemic lupus erythematosus, organ or system involvement unspecified, Unspecified osteoarthritis, unspecified site, and Unspecified viral hepatitis C without hepatic coma. She presents with back pain.  She has had chronic lower back pain for more than a year.  She is with her  today who is participating in the history.  She reports her pain is 9 in 10, constant, aching, burning, throbbing, tight, tingling, deep, sharp, shooting radiating from the lower back down both of her legs.  Pain is worse with sitting, standing,  lying, bending, coughing, lifting, walking relieved with lying down and medications.  She gets some mild relief with the muscle relaxer.  She is failed to find relief in the past with gabapentin and Lyrica.       10/14/24 - Ms. Barone returns to the office for follow up.  Today she reports she continues to have axial lower back pain.  She reports she has started taking naltrexone.  She says she had abdominal pain, nausea, vomiting coming body aches following taking this medication.  She denies taking any opioid medications.  She went to the hospital and has been treated for intravascular volume depletion and is currently wearing an external defibrillator.  Today she reports she continues to have axial lower back pain, 8/10, constant, aching.    - on exam she has reproducible pain with lumbar axial facet loading  - I independently reviewed her lumbar MRI again with her today.  She has moderate-to-severe central canal and at L4-5.  She has significant multilevel bilateral facet arthropathy  - at this point she has failed to find relief with L4-5 and L5-S1 lumbar STELLA  - she has completed formal physical therapy in the past and she has tried to maintain PT directed home exercise program.  Unfortunately sometimes her pain is so severe she does not feel like she can walk down to where there is a pool to participate in some home aquatic therapy  - she has failed both gabapentin and Lyrica in the past with the neuropathic component of her pain.  She reports she has a known diagnosis of fibromyalgia for over the past 50 years.  It is unclear if naltrexone cause the symptoms she was experiencing however it is clear that the naltrexone did not improve her pain  - we will schedule for bilateral L4-5 and L5-S1 facet joint injections.  The risks and benefits of this intervention, and alternative therapies were discussed with the patient.  The discussion of risks included infection, bleeding, need for additional procedures or  surgery, nerve damage.  Questions regarding the procedure, risks, expected outcome, and possible side effects were solicited and answered to the patient's satisfaction.  Nichelle Barone wishes to proceed with the injection or procedure.  Written consent was obtained.  - follow up 3-4 weeks post injection      : Not applicable    Angel Leon M.D.  Interventional Pain Medicine / Anesthesiology    This note was completed with dictation software and grammatical errors may exist.

## 2024-10-14 NOTE — TELEPHONE ENCOUNTER
Physician - Dr Leon    Type of Procedure/Injection - L4/5 and L5/S1 Lumbar Facet Joint Injections           Laterality - Bilateral      Anxiolysis- Local      Need to hold medication - No      Clearance needed - No      Follow up - 4 week

## 2024-10-14 NOTE — PROGRESS NOTES
"Subjective:       Patient ID: Nichelle Barone is a 69 y.o. female.    Chief Complaint: Hospital Follow Up    Here today to follow up on recent hospitalization.Admitted for chest pain and GI pain on 10/3, troponins were elevated and echocardiogram showed depressed ejection fraction.  She underwent left heart catheterization revealing nonobstructive coronary artery disease.  She was started on GDMT with Entresto, Toprol-XL, spironolactone as well as aspirin and statin and discharged home on 10/7  in stable condition with external wearable defibrillator.  Saw cardiology earlier today.  She has quit smoking and wearing nicotine patch.    HTN:  She was on Benicar in the past.  She is now on Entresto, Toprol and Spironolactone  CAD:  non-obstructive.  Started on Statin in hosptial.  Chronic Diarrhea:  She was on Welchol.    Dementia with psychosis:  Was hospitalized in Aug.  On Zypexa and doing very well.  COPD:  She is on stiolto.        Review of Systems   Constitutional:  Negative for activity change, appetite change, fatigue and fever.   Respiratory:  Negative for cough, shortness of breath and wheezing.    Cardiovascular:  Negative for chest pain and palpitations.   Gastrointestinal:  Negative for abdominal pain, constipation, diarrhea and nausea.   Neurological:  Negative for dizziness, syncope, light-headedness and headaches.       Objective:      Vitals:    10/14/24 1454   BP: 132/72   Pulse: 78   Temp: 98.1 °F (36.7 °C)   TempSrc: Oral   SpO2: 98%   Weight: 57.2 kg (126 lb 1.7 oz)   Height: 5' 3" (1.6 m)      Physical Exam  Constitutional:       General: She is not in acute distress.  Cardiovascular:      Rate and Rhythm: Normal rate and regular rhythm.      Heart sounds: Normal heart sounds.      Comments: Lifevest in place  Pulmonary:      Effort: Pulmonary effort is normal. No respiratory distress.      Breath sounds: Normal breath sounds. Decreased air movement present. No wheezing, rhonchi or rales. "   Neurological:      General: No focal deficit present.      Mental Status: She is alert.   Psychiatric:         Mood and Affect: Mood normal.         Behavior: Behavior normal.         Thought Content: Thought content normal.            Assessment:       1. NICM (nonischemic cardiomyopathy)    2. Nonobstructive atherosclerosis of coronary artery    3. Chronic obstructive pulmonary disease, unspecified COPD type    4. Essential hypertension    5. Mild dementia associated with other underlying disease, with anxiety    6. Irritable bowel syndrome with diarrhea    7. Nicotine abuse    8. Personal history of nicotine dependence        Plan:       NICM (nonischemic cardiomyopathy)  Continue current medications per cardiology.  Discussed monitoring BP and alerting us if it is running low.   Nonobstructive atherosclerosis of coronary artery  Continue Statin.  Discussed the indication for statin therapy  Chronic obstructive pulmonary disease, unspecified COPD type  Stay off Cigarettes.  Continue Inhalers.  Essential hypertension  Continue current medications  Mild dementia associated with other underlying disease, with anxiety    Irritable bowel syndrome with diarrhea    Nicotine abuse  -     CT Chest Lung Screening Low Dose; Future; Expected date: 10/14/2024    Personal history of nicotine dependence  -     CT Chest Lung Screening Low Dose; Future; Expected date: 10/14/2024    Visit today included increased complexity associated with the care of the episodic problem HTN addressed and managing the longitudinal care of the patient due to the serious and/or complex managed problem(s) as above.       Medication List with Changes/Refills   Current Medications    ALBUTEROL-IPRATROPIUM (DUO-NEB) 2.5 MG-0.5 MG/3 ML NEBULIZER SOLUTION    Take 3 mLs by nebulization every 8 (eight) hours as needed for Wheezing or Shortness of Breath (Cough). Rescue    ASPIRIN (ECOTRIN) 81 MG EC TABLET    Take 1 tablet (81 mg total) by mouth once  daily.    ATORVASTATIN (LIPITOR) 40 MG TABLET    Take 1 tablet (40 mg total) by mouth every evening.    CHOL/GL/SER/RNA/PHEN/PRG/ (SHARPER FOCUS ORAL)    Take 1 capsule by mouth 2 (two) times a day.    DOCUSATE SODIUM (COLACE) 100 MG CAPSULE    Take 100 mg by mouth 3 (three) times daily as needed for Constipation.    ENZYMES,DIGESTIVE (DIGESTIVE ENZYMES ORAL)    Take 1 tablet by mouth 2 (two) times a day.    ERGOCALCIFEROL (ERGOCALCIFEROL) 50,000 UNIT CAP    Take 50,000 Units by mouth every Tuesday.    FUROSEMIDE (LASIX) 20 MG TABLET    Take 1 tablet (20 mg total) by mouth daily as needed.    LYCOPENE ORAL    Take 1 capsule by mouth once daily.    MAGNESIUM GLUCONATE 27.5 MG MAGNE- SIUM (500 MG) TAB    Take 1 tablet by mouth every evening.    METHOCARBAMOL (ROBAXIN) 750 MG TAB    TAKE 1 TABLET BY MOUTH 3 TIMES A DAY    METOPROLOL SUCCINATE (TOPROL-XL) 25 MG 24 HR TABLET    Take 1 tablet (25 mg total) by mouth 2 (two) times daily.    NICOTINE (NICODERM CQ) 14 MG/24 HR    Place 1 patch onto the skin once daily.    NON FORMULARY MEDICATION    Take 1 capsule by mouth once daily. **MYRICA SUPPLEMENT**    NON FORMULARY MEDICATION    Take 30 mg by mouth once daily. **LITHIUM ORATATE 30MG**    OLANZAPINE (ZYPREXA) 5 MG TABLET    Take 1 tablet (5 mg total) by mouth every evening.    PANTOPRAZOLE (PROTONIX) 40 MG TABLET    Take 1 tablet (40 mg total) by mouth 2 (two) times daily.    POLYETHYLENE GLYCOL (GLYCOLAX) 17 GRAM PWPK    Take 8.5 g by mouth once daily.    SACUBITRIL-VALSARTAN (ENTRESTO) 24-26 MG PER TABLET    Take 1 tablet by mouth 2 (two) times daily.    SPIRONOLACTONE 5 MG/ML SUSP    Take 2.5 mLs (12.5 mg total) by mouth once daily.    TIOTROPIUM-OLODATEROL (STIOLTO RESPIMAT) 2.5-2.5 MCG/ACTUATION MIST    Inhale 2 puffs into the lungs once daily. Controller

## 2024-10-20 DIAGNOSIS — F02.A4 MILD DEMENTIA ASSOCIATED WITH OTHER UNDERLYING DISEASE, WITH ANXIETY: ICD-10-CM

## 2024-10-20 DIAGNOSIS — F29 PSYCHOSIS, UNSPECIFIED PSYCHOSIS TYPE: ICD-10-CM

## 2024-10-20 NOTE — TELEPHONE ENCOUNTER
No care due was identified.  [facility] Embedded Care Due Messages. Reference number: [id number].   10/20/2024 8:08:57 AM CDT

## 2024-10-21 RX ORDER — OLANZAPINE 5 MG/1
5 TABLET ORAL NIGHTLY
Qty: 90 TABLET | Refills: 3 | Status: SHIPPED | OUTPATIENT
Start: 2024-10-21

## 2024-10-25 ENCOUNTER — TELEPHONE (OUTPATIENT)
Dept: FAMILY MEDICINE | Facility: CLINIC | Age: 69
End: 2024-10-25
Payer: MEDICARE

## 2024-10-25 NOTE — TELEPHONE ENCOUNTER
Pt states nebulizer solution prescriptions needs a DX code.   Cardiologist told pt it needs to come from PCP>   Please advise.

## 2024-10-25 NOTE — TELEPHONE ENCOUNTER
----- Message from M[facility]ley sent at 10/25/2024 11:37 AM CDT -----  Type: Needs Medical Advice    Who Called:  Pt    Best Call Back Number: [phone]    Additional Information: Pt needs to speak with nurse in regards to nebulizer. Please call back to advise, Thanks!

## 2024-10-28 ENCOUNTER — TELEPHONE (OUTPATIENT)
Dept: FAMILY MEDICINE | Facility: CLINIC | Age: 69
End: 2024-10-28
Payer: MEDICARE

## 2024-10-30 ENCOUNTER — HOSPITAL ENCOUNTER (OUTPATIENT)
Dept: RADIOLOGY | Facility: HOSPITAL | Age: 69
Discharge: HOME | End: 2024-10-30
Attending: ANESTHESIOLOGY | Admitting: ANESTHESIOLOGY
Payer: MEDICARE

## 2024-10-30 ENCOUNTER — HOSPITAL ENCOUNTER (OUTPATIENT)
Facility: HOSPITAL | Age: 69
Discharge: HOME | End: 2024-10-30
Attending: ANESTHESIOLOGY | Admitting: ANESTHESIOLOGY
Payer: MEDICARE

## 2024-10-30 DIAGNOSIS — M47.816 LUMBAR SPONDYLOSIS: Primary | ICD-10-CM

## 2024-10-30 DIAGNOSIS — M54.50 LOWER BACK PAIN: ICD-10-CM

## 2024-10-30 PROCEDURE — 64494 INJ PARAVERT F JNT L/S 2 LEV: CPT | Mod: 50,PO | Performed by: ANESTHESIOLOGY

## 2024-10-30 PROCEDURE — 64494 INJ PARAVERT F JNT L/S 2 LEV: CPT | Mod: 50,,, | Performed by: ANESTHESIOLOGY

## 2024-10-30 PROCEDURE — 25000003 PHARM REV CODE 250: Mod: PO | Performed by: ANESTHESIOLOGY

## 2024-10-30 PROCEDURE — 64493 INJ PARAVERT F JNT L/S 1 LEV: CPT | Mod: 50,PO | Performed by: ANESTHESIOLOGY

## 2024-10-30 PROCEDURE — 63600175 PHARM REV CODE 636 W HCPCS: Mod: PO | Performed by: ANESTHESIOLOGY

## 2024-10-30 PROCEDURE — 64493 INJ PARAVERT F JNT L/S 1 LEV: CPT | Mod: 50,,, | Performed by: ANESTHESIOLOGY

## 2024-10-30 RX ORDER — METHYLPREDNISOLONE ACETATE 80 MG/ML
INJECTION, SUSPENSION INTRA-ARTICULAR; INTRALESIONAL; INTRAMUSCULAR; SOFT TISSUE
Status: DISCONTINUED | OUTPATIENT
Start: 2024-10-30 | End: 2024-10-30 | Stop reason: HOSPADM

## 2024-10-30 RX ORDER — ALPRAZOLAM 0.5 MG/1
1 TABLET, ORALLY DISINTEGRATING ORAL ONCE AS NEEDED
Status: COMPLETED | OUTPATIENT
Start: 2024-10-30 | End: 2024-10-30

## 2024-10-30 RX ORDER — BUPIVACAINE HYDROCHLORIDE 2.5 MG/ML
INJECTION, SOLUTION EPIDURAL; INFILTRATION; INTRACAUDAL
Status: DISCONTINUED | OUTPATIENT
Start: 2024-10-30 | End: 2024-10-30 | Stop reason: HOSPADM

## 2024-10-30 RX ORDER — LIDOCAINE HYDROCHLORIDE 10 MG/ML
INJECTION, SOLUTION EPIDURAL; INFILTRATION; INTRACAUDAL; PERINEURAL
Status: DISCONTINUED | OUTPATIENT
Start: 2024-10-30 | End: 2024-10-30 | Stop reason: HOSPADM

## 2024-10-30 RX ADMIN — ALPRAZOLAM 1 MG: 0.5 TABLET, ORALLY DISINTEGRATING ORAL at 02:10

## 2024-10-30 NOTE — DISCHARGE SUMMARY
[facility]  Discharge Note  Short Stay    Admit Date: 10/30/2024    Discharge Date: 10/30/2024    Attending Physician: Angel Leon     Discharge Provider: Angel Leon    Diagnoses:  There are no hospital problems to display for this patient.      Discharged Condition: Good    Final Diagnoses: Lumbar spondylosis [M47.816]    Disposition: Home or Self Care    Hospital Course: No complications, uneventful    Outcome of Hospitalization, Treatment, Procedure, or Surgery:  Patient was admitted for outpatient interventional pain management procedure. The patient tolerated the procedure well with no complications.    Follow up/Patient Instructions:  Follow up as scheduled in Pain Management office in 2-3 weeks.  Patient has received instructions and follow up date and time.    Medications:  Continue previous medications    Discharge Procedure Orders   Notify your health care provider if you experience any of the following:  temperature >100.4     Notify your health care provider if you experience any of the following:  persistent nausea and vomiting or diarrhea     Notify your health care provider if you experience any of the following:  severe uncontrolled pain     Notify your health care provider if you experience any of the following:  redness, tenderness, or signs of infection (pain, swelling, redness, odor or green/yellow discharge around incision site)     Notify your health care provider if you experience any of the following:  difficulty breathing or increased cough     Notify your health care provider if you experience any of the following:  severe persistent headache     Notify your health care provider if you experience any of the following:  worsening rash     Notify your health care provider if you experience any of the following:  persistent dizziness, light-headedness, or visual disturbances     Notify your health care provider if you experience any of the following:  increased confusion or  weakness     Activity as tolerated

## 2024-10-30 NOTE — OP NOTE
PROCEDURE DATE: 10/30/2024    PROCEDURE: bilateral side L4/5 and L5/S1 facet joint injection under fluoroscopy.    Diagnosis: lumbar spondylosis    PHYSICIAN: Angel Leon MD    MEDICATIONS INJECTED:  From a mixture of 3 ml of 0.25% bupivicaine and 80mg of methylprednisone, 0.9ml of solution was injected into each facet joint.    LOCAL ANESTHETIC USED:   Lidocaine 1%, 1 ml per level.    ESTIMATED BLOOD LOSS:  none    COMPLICATIONS:  none    TECHNIQUE:   A time-out was taken to identify the patient and procedure side prior to starting the procedure.  Lying in a prone position, the patient was prepped and draped in the usual sterile fashion using ChloraPrep x2 and fenestrated drape.  The levels were determined under fluoroscopic guidance in the AP view and then rotated into the oblique view to visualize the joint space.  Local anesthetic was given by raising a wheal at the skin and then anesthetizing a tract using a 25-gauge, 1.5inch needle.  A 3.5 in 22-gauge needle was introduced into the L4/5 and L5/S1 facet joints.  Negative pressure applied to make sure that there was no intravascular placement. no contrast was injected to confirm placement and to confirm arthrogram and no vascular uptake.  Medication was then injected slowly.  The patient tolerated the procedure well.    The patient was monitored after the procedure.  Patient was given post procedure and discharge instructions to follow at home. The patient was discharged in a stable condition

## 2024-10-30 NOTE — PLAN OF CARE
Pt meets criteria for discharge. Vital signs stable. Pt without falls. Discharge teaching complete. Questions answered.

## 2024-10-31 VITALS
HEIGHT: 63 IN | WEIGHT: 126 LBS | TEMPERATURE: 98 F | OXYGEN SATURATION: 99 % | SYSTOLIC BLOOD PRESSURE: 148 MMHG | BODY MASS INDEX: 22.32 KG/M2 | RESPIRATION RATE: 17 BRPM | DIASTOLIC BLOOD PRESSURE: 71 MMHG | HEART RATE: 76 BPM

## 2024-11-07 ENCOUNTER — TELEPHONE (OUTPATIENT)
Dept: FAMILY MEDICINE | Facility: CLINIC | Age: 69
End: 2024-11-07
Payer: MEDICARE

## 2024-11-07 RX ORDER — PANTOPRAZOLE SODIUM 40 MG/1
40 TABLET, DELAYED RELEASE ORAL 2 TIMES DAILY
Qty: 60 TABLET | Refills: 0 | Status: CANCELLED | OUTPATIENT
Start: 2024-11-07 | End: 2024-12-07

## 2024-11-07 NOTE — TELEPHONE ENCOUNTER
----- Message from Carin sent at 11/7/2024 11:02 AM CST -----  Contact: Lito  Type: Patient Call          Who Called: Patient's  -Lito      Does the patient know what this is regarding? Requesting a call back to discuss scheduling a double appt for him and his wife together. Someone was suppose to contact him and didn't. Please advise          Does the patient rather a call back or a response via [facility]? call        Best Call Back Number: [phone]         Additional Information:

## 2024-11-07 NOTE — TELEPHONE ENCOUNTER
----- Message from Carin sent at 11/7/2024 10:56 AM CST -----  Contact: Lito  Type: RX Refill Request    Who Called: Patient's  - Lito    Refill or New RX: Refill    RX Name and Strength: pantoprazole (PROTONIX) 40 MG tablet. Pt is currently out and a drNeerajin the ED originally wrote the prescription and the  was told to contact Dr. Hernandez in regards to having this medication refilled. Please advise          Preferred Pharmacy with Phone Number:     [id number] [place] [address] AT [facility] & [address] [address]  Phone: [phone] Fax: [phone]           Best Call Back Number: [phone]       Additional Information:

## 2024-11-08 NOTE — TELEPHONE ENCOUNTER
----- Message from Ririm[place]arena sent at 11/8/2024  3:22 PM CST -----  Regarding: refill  Type:  RX Refill Request    Who Called: pt     Refill or New Rx:refill    RX Name and Strength:pantoprazole (PROTONIX) 40 MG tablet 60 tablet 0 11/7/2024 12/7/2024 No  Sig - Route: Take 1 tablet (40 mg total) by mouth 2 (two) times daily. - Oral  Class: Normal  Order: [id number]  Date/Time Signed: 11/7/2024 11:20            How is the patient currently taking it? (ex. 1XDay):see above    Is this a 30 day or 90 day RX:see above    Preferred Pharmacy with phone number:  [id number] [place] [address] AT [facility] & [address] [address]  Phone: [phone] Fax: [phone]        Local or Mail Order:local     Ordering Provider:cathy Estevez Call Back Number:[phone]      Additional Information: st that pharm has not received script.  Please call to discuss.

## 2024-11-11 RX ORDER — PANTOPRAZOLE SODIUM 40 MG/1
40 TABLET, DELAYED RELEASE ORAL 2 TIMES DAILY
Qty: 180 TABLET | Refills: 3 | Status: SHIPPED | OUTPATIENT
Start: 2024-11-11 | End: 2025-11-11

## 2024-11-11 NOTE — TELEPHONE ENCOUNTER
No care due was identified.  Health [facility] Embedded Care Due Messages. Reference number: [id number].   11/11/2024 3:41:52 PM CST

## 2024-11-11 NOTE — TELEPHONE ENCOUNTER
----- Message from Marlyn sent at 11/11/2024  1:42 PM CST -----  Regarding: medication needed  Name of Who is Calling: Nichelle            What is the request in detail:Pt is requesting a call back because a medication is needed and she would like to request it if possible. pantoprazole (PROTONIX) 40 MG tablet.            Can the clinic reply by [facility]:No            What Number to Call Back if not in [facility]: [phone]

## 2024-11-22 RX ORDER — METHOCARBAMOL 750 MG/1
750 TABLET, FILM COATED ORAL 3 TIMES DAILY
Qty: 180 TABLET | Refills: 0 | Status: SHIPPED | OUTPATIENT
Start: 2024-11-22

## 2024-11-22 NOTE — TELEPHONE ENCOUNTER
Please see the attached refill request. Last ordered by you on 9/10/24 for a 60 day supply, had ACDF C5-7 on 3/28/23, last seen by Dr. Heller in 6/13/24 and is currently a f/u PRN pt.

## 2024-12-18 ENCOUNTER — HOSPITAL ENCOUNTER (OUTPATIENT)
Dept: RADIOLOGY | Facility: HOSPITAL | Age: 69
Discharge: HOME | End: 2024-12-18
Attending: FAMILY MEDICINE
Payer: MEDICARE

## 2024-12-18 DIAGNOSIS — Z87.891 PERSONAL HISTORY OF NICOTINE DEPENDENCE: ICD-10-CM

## 2024-12-18 DIAGNOSIS — Z72.0 NICOTINE ABUSE: ICD-10-CM

## 2024-12-18 PROCEDURE — 71271 CT THORAX LUNG CANCER SCR C-: CPT | Mod: 26,,, | Performed by: RADIOLOGY

## 2024-12-18 PROCEDURE — 71271 CT THORAX LUNG CANCER SCR C-: CPT | Mod: TC,PO

## 2025-01-14 DIAGNOSIS — Z00.00 ENCOUNTER FOR MEDICARE ANNUAL WELLNESS EXAM: ICD-10-CM

## 2025-01-20 PROBLEM — I70.0 AORTIC ATHEROSCLEROSIS: Status: ACTIVE | Noted: 2025-01-20

## 2025-01-27 DIAGNOSIS — J43.2 CENTRILOBULAR EMPHYSEMA: ICD-10-CM

## 2025-01-27 NOTE — TELEPHONE ENCOUNTER
No care due was identified.  [facility] Embedded Care Due Messages. Reference number: [id number].   1/27/2025 3:03:17 PM CST

## 2025-01-28 RX ORDER — IPRATROPIUM BROMIDE AND ALBUTEROL SULFATE 2.5; .5 MG/3ML; MG/3ML
SOLUTION RESPIRATORY (INHALATION)
Qty: 270 ML | Refills: 3 | Status: SHIPPED | OUTPATIENT
Start: 2025-01-28

## 2025-01-28 NOTE — TELEPHONE ENCOUNTER
Refill Decision Note   Nichelle Barone  is requesting a refill authorization.  Brief Assessment and Rationale for Refill:  Approve     Medication Therapy Plan:         Comments:     Note composed:6:44 AM 01/28/2025

## 2025-02-02 DIAGNOSIS — J43.2 CENTRILOBULAR EMPHYSEMA: Primary | ICD-10-CM

## 2025-02-02 NOTE — TELEPHONE ENCOUNTER
No care due was identified.  Health [facility] Embedded Care Due Messages. Reference number: [id number].   2/02/2025 3:52:52 AM CST

## 2025-02-03 RX ORDER — TIOTROPIUM BROMIDE AND OLODATEROL 3.124; 2.736 UG/1; UG/1
2 SPRAY, METERED RESPIRATORY (INHALATION) DAILY
Qty: 12 G | Refills: 2 | Status: SHIPPED | OUTPATIENT
Start: 2025-02-03

## 2025-02-03 NOTE — TELEPHONE ENCOUNTER
Refill Routing Note   Medication(s) are not appropriate for processing by [facility] for the following reason(s):        No active prescription written by provider    [facility] action(s):  Defer               Appointments  past 12m or future 3m with PCP    Date Provider   Last Visit   10/14/2024 Edmundo Amanda MD   Next Visit   3/21/2025 Edmundo Amanda MD   ED visits in past 90 days: 0        Note composed:10:48 PM 02/02/2025

## 2025-02-12 ENCOUNTER — OFFICE VISIT (OUTPATIENT)
Dept: GASTROENTEROLOGY | Facility: CLINIC | Age: 70
End: 2025-02-12
Payer: MEDICARE

## 2025-02-12 VITALS — WEIGHT: 125.44 LBS | HEIGHT: 63 IN | BODY MASS INDEX: 22.23 KG/M2

## 2025-02-12 DIAGNOSIS — R13.14 PHARYNGOESOPHAGEAL DYSPHAGIA: ICD-10-CM

## 2025-02-12 DIAGNOSIS — R19.8 IRREGULAR BOWEL HABITS: ICD-10-CM

## 2025-02-12 DIAGNOSIS — Z79.01 ANTICOAGULANT LONG-TERM USE: ICD-10-CM

## 2025-02-12 DIAGNOSIS — Z87.898 HISTORY OF SHORTNESS OF BREATH: ICD-10-CM

## 2025-02-12 DIAGNOSIS — R93.3 ABNORMAL CT SCAN, GASTROINTESTINAL TRACT: ICD-10-CM

## 2025-02-12 DIAGNOSIS — E65 FAT DEPOSITS: ICD-10-CM

## 2025-02-12 DIAGNOSIS — R11.2 NON-INTRACTABLE VOMITING WITH NAUSEA: Primary | ICD-10-CM

## 2025-02-12 DIAGNOSIS — R10.84 GENERALIZED ABDOMINAL PAIN: ICD-10-CM

## 2025-02-12 PROCEDURE — 99214 OFFICE O/P EST MOD 30 MIN: CPT | Mod: S$PBB,,, | Performed by: NURSE PRACTITIONER

## 2025-02-12 PROCEDURE — 99213 OFFICE O/P EST LOW 20 MIN: CPT | Mod: PBBFAC,PO | Performed by: NURSE PRACTITIONER

## 2025-02-12 PROCEDURE — 99999 PR PBB SHADOW E&M-EST. PATIENT-LVL III: CPT | Mod: PBBFAC,,, | Performed by: NURSE PRACTITIONER

## 2025-02-12 NOTE — PROGRESS NOTES
"Subjective:       Patient ID: Nichelle Barone is a 69 y.o. female Body mass index is 22.22 kg/m².    Chief Complaint: Nausea, Constipation, Diarrhea, and Abdominal Pain    This patient is established.     Reviewed hospital discharge summary: "Admission Date: 10/3/2024  Hospital Length of Stay: 4 days  Discharge Date and Time:  10/07/2024 12:48 PM  Attending Physician: Ilene Garay MD   Discharging Provider: Christine Buck DO  Primary Care Provider: Edmundo Amanda MD  Primary Care Team: Networked reference to record PCT  HPI: The patient is an elderly 70 y/o WF with pmhx as below including hx of IBS symptoms, dysphagia following with GI with 1 month of abdominal pain, loose stools and hx of dysphagia. Patient notes both constipation and diarrhea and was being evaluated by GI in clinic on day of admission for poor po tolerance with dysphagia and epigastric pain. Patient sent to ed with concerns for IVVD and need for further GI eval. Patient with sinus tachycardia on eval in ED with HR in 130s. Given 1 liter IVFs with improvement. Labs showed initial trop of 0.32 and repeat of  0.30 with some twi on EKG. Ddimer normal. CXR clear. CT abd/pelvis without acute findings. Patient seen and examined and case discussed with Dr. Emery in ed as well as with patient and records including NP David note from GI clinic on 10/3 reviewed as well.  Procedure(s): Left Heart Cath Rm 3602  Hospital Course: Patient presented to the hospital from her GI office for concerns of dehydration. Patient came in with one month of nausea, vomiting, diarrhea, and epigastric pain. GI and and cardiology were consulted. Cardiac work up revealed an NSTEMI. Cardiology planned TTE and to follow up outpatient. GI started PPI and recommended miralax to prevent constipation. TTE revealed an EF of 20-25% that was determined to likely be takotsubo cardiomyopathy. Cardiology started entresto and ordered a LifeVest for discharge. Patient " "reported feeling much better with PPI, entresto, and duonebs. GI planning to work up GI issues in an outpatient setting. Patient given two doses of lasix. Patient still has a minor cough on day of discharge and received a breathing treatment before discharge. Patient's pain has improved but is still having some dysphagia with solid foods. Patient is motivated in her smoking cessation. Will follow up with Primary care, Cardiologist, and GI specialist after discharge. Patient received her Assure LifeVest before discharge."    GI Problem  The primary symptoms include fatigue, abdominal pain, nausea (frequent), vomiting and diarrhea. Primary symptoms do not include fever, weight loss, melena, hematemesis, jaundice, hematochezia or dysuria.   The abdominal pain began more than 2 days ago (started ~2017). The abdominal pain has been gradually improving (since starting naltrexone ~3 weeks ago) since its onset. The abdominal pain is generalized (intermittent; starts to RUQ then radiates to generalized abdomen; described a pain or pressure). The severity of the abdominal pain is 0/10 (currently). The abdominal pain is relieved by nothing.   The vomiting began more than 2 days ago. Frequency: occurs every other day. The emesis contains bilious material and stomach contents (sputum; pink tinged yesterday).   The diarrhea began more than 1 week ago (started ~2017). The diarrhea is semi-solid and watery. The diarrhea occurs more than 10 times per day. Risk factors: recent hospitalization.   The illness is also significant for dysphagia (started over a year ago with everytime she eats, went through the front of her neck for her neck surgery), constipation (if she does not take a laxative, she does not have a bowel movement), tenesmus and back pain (seeing specialist). The illness does not include chills. Associated symptoms comments:   TREATMENT: digestive enzymes BID, colace tid prn, glycolax 8.5 grams daily PRN- taking 4-5 " times a week  PAST TREATMENT: welchol. Significant associated medical issues include GERD (frequently throughout the day; PAST TREATMENT: PROTONIX (has not taken since hospital)), liver disease (history of hepatitis C- treated in the past) and irritable bowel syndrome.       Review of Systems   Constitutional:  Positive for appetite change (decreased) and fatigue. Negative for chills, fever and weight loss.   HENT:  Positive for trouble swallowing. Negative for sore throat.    Respiratory:  Positive for cough, choking (denies needing heimlich maneuver or seeking medical assistance to relieve it) and shortness of breath (occasional, denies currently; history of COPD).    Cardiovascular:  Negative for chest pain.   Gastrointestinal:  Positive for abdominal pain, constipation (if she does not take a laxative, she does not have a bowel movement), diarrhea, dysphagia (started over a year ago with everytime she eats, went through the front of her neck for her neck surgery), nausea (frequent) and vomiting. Negative for anal bleeding, blood in stool, hematemesis, hematochezia, jaundice, melena and rectal pain.   Genitourinary:  Negative for difficulty urinating, dysuria and flank pain.   Musculoskeletal:  Positive for back pain (seeing specialist).   Neurological:  Negative for weakness.         No LMP recorded. Patient has had a hysterectomy.  Past Medical History:   Diagnosis Date    Arthritis     Cancer     not sure    Coma 1990    ? after medication  x11 days    COPD (chronic obstructive pulmonary disease)     Decreased circulation     Dementia with psychosis     Endometriosis, unspecified     Fracture, femur     right    Fracture, foot     right    Heel fracture     left    Hepatitis C     Hiatal hernia with GERD     Hypertension     Pulmonary embolism     Respiratory distress     Rheumatoid arthritis, unspecified     Systemic lupus erythematosus, organ or system involvement unspecified     Unspecified osteoarthritis,  unspecified site     Unspecified viral hepatitis C without hepatic coma      Past Surgical History:   Procedure Laterality Date    ANGIOGRAM, CORONARY, WITH LEFT HEART CATHETERIZATION  10/4/2024    Procedure: Left Heart Cath Rm 3605;  Surgeon: Artemio Byers MD;  Location: [facility] CATH;  Service: Cardiovascular;;    ANTERIOR CERVICAL DISCECTOMY W/ FUSION N/A 2023    Procedure: DISCECTOMY, SPINE, CERVICAL, ANTERIOR APPROACH, WITH FUSION;  Surgeon: Soto Heller DO;  Location: [facility] OR;  Service: Neurosurgery;  Laterality: N/A;  IOM notified 3/27 JG, C-ARM, MICRO, [employer] notified 3/27 JG    COLONOSCOPY      prior to , normal findings per patient report    EPIDURAL STEROID INJECTION INTO LUMBAR SPINE N/A 2024    Procedure: Injection-steroid-epidural-lumbar;  Surgeon: Shreyas Christopher MD;  Location: [facility] OR;  Service: Anesthesiology;  Laterality: N/A;  L4-5    EPIDURAL STEROID INJECTION INTO LUMBAR SPINE N/A 2024    Procedure: Injection-steroid-epidural-lumbar  L5/S1;  Surgeon: Angel Leon MD;  Location: [facility] OR;  Service: Pain Management;  Laterality: N/A;    HYSTERECTOMY      INJECTION OF FACET JOINT Bilateral 10/30/2024    Procedure: INJECTION, FACET JOINT L4/5 and L5/S1;  Surgeon: Angel Leon MD;  Location: [facility] OR;  Service: Pain Management;  Laterality: Bilateral;    TONSILLECTOMY      UPPER GASTROINTESTINAL ENDOSCOPY      prior to 2018, normal findings per patient report     Family History   Problem Relation Name Age of Onset    Crohn's disease Mother      Colon cancer Neg Hx      Celiac disease Neg Hx      Esophageal cancer Neg Hx      Stomach cancer Neg Hx      Ulcerative colitis Neg Hx       Social History     Tobacco Use    Smoking status: Former     Current packs/day: 0.00     Average packs/day: 1.3 packs/day for 54.0 years (72.0 ttl pk-yrs)     Types: Cigarettes     Start date:      Quit date:      Years since quittin.1    Smokeless tobacco: Never    Substance Use Topics    Alcohol use: Not Currently    Drug use: Not Currently     Types: Hydrocodone, Oxycodone     Wt Readings from Last 10 Encounters:   02/12/25 56.9 kg (125 lb 7.1 oz)   01/13/25 57.2 kg (126 lb)   01/06/25 57.2 kg (126 lb)   10/25/24 57.2 kg (126 lb)   10/14/24 57.2 kg (126 lb 1.7 oz)   10/14/24 56.5 kg (124 lb 10.7 oz)   10/14/24 56.4 kg (124 lb 6.4 oz)   10/07/24 53.8 kg (118 lb 9.7 oz)   10/03/24 54.3 kg (119 lb 11.4 oz)   09/16/24 55.7 kg (122 lb 11 oz)     Lab Results   Component Value Date    WBC 12.10 10/07/2024    HGB 14.1 10/07/2024    HCT 41.1 10/07/2024    MCV 89 10/07/2024     10/07/2024     CMP  Sodium   Date Value Ref Range Status   10/14/2024 138 136 - 145 mmol/L Final     Potassium   Date Value Ref Range Status   10/14/2024 4.8 3.5 - 5.1 mmol/L Final     Chloride   Date Value Ref Range Status   10/14/2024 103 95 - 110 mmol/L Final     CO2   Date Value Ref Range Status   10/14/2024 29 23 - 29 mmol/L Final     Glucose   Date Value Ref Range Status   10/14/2024 101 70 - 110 mg/dL Final     BUN   Date Value Ref Range Status   10/14/2024 10 8 - 23 mg/dL Final     Creatinine   Date Value Ref Range Status   10/14/2024 1.2 0.5 - 1.4 mg/dL Final     Calcium   Date Value Ref Range Status   10/14/2024 10.0 8.7 - 10.5 mg/dL Final     Total Protein   Date Value Ref Range Status   10/03/2024 8.5 (H) 6.0 - 8.4 g/dL Final     Albumin   Date Value Ref Range Status   10/03/2024 4.8 3.5 - 5.2 g/dL Final     Total Bilirubin   Date Value Ref Range Status   10/03/2024 0.9 0.2 - 1.3 mg/dL Final     Alkaline Phosphatase   Date Value Ref Range Status   10/03/2024 73 38 - 145 U/L Final     AST   Date Value Ref Range Status   10/03/2024 37 (H) 14 - 36 U/L Final     ALT   Date Value Ref Range Status   10/03/2024 21 0 - 35 U/L Final     Anion Gap   Date Value Ref Range Status   10/14/2024 6 (L) 8 - 16 mmol/L Final       Lab Results   Component Value Date    TSH 1.330 10/03/2024     Lab Results    Component Value Date    HEPCAB Positive (A) 08/05/2022     10/7/2024 magnesium WNL  10/5/2024 hepatitis c RNA not detected  6/12/2023 stool studies reviewed (occasional yeast, pH 6.0) & celiac serology WNL    Reviewed some records in media from 2018 which showed colitis from an ER visit that indicated colitis.    Reviewed prior medical records including radiology report of 10/3/2024 CT abdomen pelvis without contrast.    Objective:      Physical Exam  Vitals and nursing note reviewed.   Constitutional:       General: She is not in acute distress.     Appearance: Normal appearance. She is well-developed. She is not ill-appearing, toxic-appearing or diaphoretic.   HENT:      Mouth/Throat:      Lips: Pink. No lesions.      Mouth: Mucous membranes are moist. No oral lesions.      Tongue: No lesions.      Pharynx: Oropharynx is clear. No pharyngeal swelling or posterior oropharyngeal erythema.   Eyes:      General: No scleral icterus.     Conjunctiva/sclera: Conjunctivae normal.   Pulmonary:      Effort: Pulmonary effort is normal. No respiratory distress.   Abdominal:      General: Bowel sounds are normal. There is no distension.      Palpations: Abdomen is soft. Abdomen is not rigid. There is no mass.      Tenderness: There is no abdominal tenderness. There is no guarding or rebound.   Skin:     General: Skin is warm and dry.      Coloration: Skin is not jaundiced or pale.      Findings: No erythema or rash.   Neurological:      Mental Status: She is alert and oriented to person, place, and time.   Psychiatric:         Behavior: Behavior normal.         Thought Content: Thought content normal.         Judgment: Judgment normal.         Assessment:       1. Non-intractable vomiting with nausea    2. Irregular bowel habits    3. Generalized abdominal pain    4. Pharyngoesophageal dysphagia    5. Abnormal CT scan, gastrointestinal tract    6. Fat deposits in colon    7. Anticoagulant long-term use    8. History of  shortness of breath        Plan:       Non-intractable vomiting with nausea  -     CBC Without Differential; Future; Expected date: 02/12/2025  -     Hepatic Function Panel; Future; Expected date: 02/12/2025  -     Lipase; Future; Expected date: 02/12/2025  -     IgA; Future; Expected date: 02/12/2025  -     Tissue Transglutaminase, IgA; Future; Expected date: 02/12/2025  - RESTART PROTONIX 40 MG BID AS PRESCRIBED; patient declined new prescription  - schedule EGD, discussed procedure with patient, including risks and benefits, patient verbalized understanding    Irregular bowel habits  Comments:  constipation predominant  Orders:  -     Stool Exam-Ova,Cysts,Parasites; Future; Expected date: 02/12/2025  -     Giardia / Cryptosporidum, EIA; Future; Expected date: 02/12/2025  -     WBC, Stool; Future; Expected date: 02/12/2025  -     Stool culture; Future; Expected date: 02/12/2025  -     Clostridium difficile EIA; Future; Expected date: 02/12/2025  -     IgA; Future; Expected date: 02/12/2025  -     Tissue Transglutaminase, IgA; Future; Expected date: 02/12/2025  - schedule Colonoscopy, discussed procedure with the patient, including risks and benefits, patient verbalized understanding  - recommended increase fiber in diet, especially soluble fiber since this can help bulk up the stool consistency and may help to slow down how fast the stool goes through the colon and can prevent diarrhea; Recommend high fiber diet (20-30 grams of fiber daily)/OTC fiber supplements daily as directed, such as Benefiber or Metamucil.  - CAN CONTINUE OTC stool softener such as Colace as directed to avoid hard stools and straining with bowel movements PRN  - can continue OTC MiraLax once daily (8.5 g PO) as directed PRN persistent constipation  -If still no improvement with these measures, call/follow-up    Generalized abdominal pain  -     CBC Without Differential; Future; Expected date: 02/12/2025  -     Hepatic Function Panel; Future;  Expected date: 02/12/2025  -     Lipase; Future; Expected date: 02/12/2025  -     IgA; Future; Expected date: 02/12/2025  -     Tissue Transglutaminase, IgA; Future; Expected date: 02/12/2025  - RESTART PROTONIX 40 MG BID AS PRESCRIBED; patient declined new prescription  - schedule EGD, discussed procedure with patient, including risks and benefits, patient verbalized understanding  - schedule Colonoscopy, discussed procedure with the patient, including risks and benefits, patient verbalized understanding  - avoid/minimize use of NSAIDs- since they can cause GI upset, bleeding and/or ulcers. If NSAID must be taken, recommend take with food.    Pharyngoesophageal dysphagia  - schedule EGD, discussed procedure with patient and possible esophageal dilation may be performed during procedure if indicated, patient verbalized understanding  - recommend to eat smaller more frequent meals and to eat slowly and advised to eat a soft diet. Take medications one at a time with a full glass of water.  - possible UGI/esophagram/esophageal manometry if symptoms persist    Abnormal CT scan, gastrointestinal tract: Fat deposits in colon  - schedule Colonoscopy, discussed procedure with the patient, including risks and benefits, patient verbalized understanding    Anticoagulant long-term use  - anticoagulant(s) will likely need to be held for endoscopy, nurse will confirm with endoscopist, cardiologist, and/or PCP.    History of shortness of breath  - follow-up with PCP/pulmonology for continued evaluation and management ASAP  - if experiencing symptoms of headache, chest pain, severe/persistent shortness of breath, dizziness, and/or blurred vision, recommend going to ER for further evaluation and management    Follow up in about 1 month (around 3/12/2025), or if symptoms worsen or fail to improve.      If no improvement in symptoms or symptoms worsen, call/follow-up at clinic or go to ER.        35 minutes of total time spent on the  encounter, which includes face to face time and non-face to face time preparing to see the patient (e.g., review of tests), Obtaining and/or reviewing separately obtained history, Documenting clinical information in the electronic or other health record, Independently interpreting results (not separately reported) and communicating results to the patient/family/caregiver, or Care coordination (not separately reported).

## 2025-02-14 ENCOUNTER — TELEPHONE (OUTPATIENT)
Dept: GASTROENTEROLOGY | Facility: CLINIC | Age: 70
End: 2025-02-14
Payer: MEDICARE

## 2025-02-14 NOTE — TELEPHONE ENCOUNTER
Called and spoke with patient's , patient offered 02/18/2025 per recommendations of Dr. Martinez at [facility], patient and  accepted the date and location.  Dr. Martinez notified of the 7 am start time and approved it at [facility].

## 2025-02-24 ENCOUNTER — RESULTS FOLLOW-UP (OUTPATIENT)
Dept: GASTROENTEROLOGY | Facility: CLINIC | Age: 70
End: 2025-02-24

## 2025-02-24 NOTE — PROGRESS NOTES
Please notify patient that the stomach biopsies were reviewed and showed no bacteria.      The colon polyp pathology was reviewed and is benign and is the adenomatous type which is precancerous/risk factor for cancer.  Repeat colonoscopy recommended in 5 years.  Place reminder in system for repeat colonoscopy.

## 2025-02-26 ENCOUNTER — LAB VISIT (OUTPATIENT)
Dept: LAB | Facility: HOSPITAL | Age: 70
End: 2025-02-26
Attending: FAMILY MEDICINE
Payer: MEDICARE

## 2025-02-26 DIAGNOSIS — R19.8 IRREGULAR BOWEL HABITS: ICD-10-CM

## 2025-02-26 LAB
C DIFF GDH STL QL: NEGATIVE
C DIFF TOX A+B STL QL IA: NEGATIVE

## 2025-02-26 PROCEDURE — 87427 SHIGA-LIKE TOXIN AG IA: CPT | Performed by: NURSE PRACTITIONER

## 2025-02-26 PROCEDURE — 87046 STOOL CULTR AEROBIC BACT EA: CPT | Performed by: NURSE PRACTITIONER

## 2025-02-26 PROCEDURE — 87449 NOS EACH ORGANISM AG IA: CPT | Performed by: NURSE PRACTITIONER

## 2025-02-26 PROCEDURE — 87449 NOS EACH ORGANISM AG IA: CPT | Mod: 91 | Performed by: NURSE PRACTITIONER

## 2025-02-26 PROCEDURE — 89055 LEUKOCYTE ASSESSMENT FECAL: CPT | Performed by: NURSE PRACTITIONER

## 2025-02-26 PROCEDURE — 87328 CRYPTOSPORIDIUM AG IA: CPT | Performed by: NURSE PRACTITIONER

## 2025-02-26 PROCEDURE — 87045 FECES CULTURE AEROBIC BACT: CPT | Performed by: NURSE PRACTITIONER

## 2025-02-27 LAB
CRYPTOSP AG STL QL IA: NEGATIVE
E COLI SXT1 STL QL IA: NEGATIVE
E COLI SXT2 STL QL IA: NEGATIVE
G LAMBLIA AG STL QL IA: NEGATIVE
WBC #/AREA STL HPF: NORMAL /[HPF]

## 2025-03-01 LAB — BACTERIA STL CULT: NORMAL

## 2025-03-06 ENCOUNTER — RESULTS FOLLOW-UP (OUTPATIENT)
Dept: GASTROENTEROLOGY | Facility: CLINIC | Age: 70
End: 2025-03-06

## 2025-03-06 LAB — O+P STL MICRO: NORMAL

## 2025-03-21 ENCOUNTER — OFFICE VISIT (OUTPATIENT)
Dept: FAMILY MEDICINE | Facility: CLINIC | Age: 70
End: 2025-03-21
Payer: MEDICARE

## 2025-03-21 VITALS
TEMPERATURE: 98 F | OXYGEN SATURATION: 96 % | BODY MASS INDEX: 22.61 KG/M2 | DIASTOLIC BLOOD PRESSURE: 70 MMHG | HEIGHT: 63 IN | HEART RATE: 90 BPM | SYSTOLIC BLOOD PRESSURE: 120 MMHG | WEIGHT: 127.63 LBS

## 2025-03-21 DIAGNOSIS — F29 PSYCHOSIS, UNSPECIFIED PSYCHOSIS TYPE: ICD-10-CM

## 2025-03-21 DIAGNOSIS — M48.02 STENOSIS OF CERVICAL SPINE WITH MYELOPATHY: ICD-10-CM

## 2025-03-21 DIAGNOSIS — M50.20 DISPLACEMENT OF CERVICAL INTERVERTEBRAL DISC: ICD-10-CM

## 2025-03-21 DIAGNOSIS — J44.9 CHRONIC OBSTRUCTIVE PULMONARY DISEASE, UNSPECIFIED COPD TYPE: ICD-10-CM

## 2025-03-21 DIAGNOSIS — I25.10 NONOBSTRUCTIVE ATHEROSCLEROSIS OF CORONARY ARTERY: ICD-10-CM

## 2025-03-21 DIAGNOSIS — Z00.00 ANNUAL PHYSICAL EXAM: Primary | ICD-10-CM

## 2025-03-21 DIAGNOSIS — F03.A4 MILD DEMENTIA WITH ANXIETY, UNSPECIFIED DEMENTIA TYPE: ICD-10-CM

## 2025-03-21 DIAGNOSIS — G99.2 STENOSIS OF CERVICAL SPINE WITH MYELOPATHY: ICD-10-CM

## 2025-03-21 DIAGNOSIS — L57.0 AK (ACTINIC KERATOSIS): ICD-10-CM

## 2025-03-21 PROCEDURE — 99999 PR PBB SHADOW E&M-EST. PATIENT-LVL IV: CPT | Mod: PBBFAC,,, | Performed by: FAMILY MEDICINE

## 2025-03-21 PROCEDURE — 99214 OFFICE O/P EST MOD 30 MIN: CPT | Mod: PBBFAC,PO | Performed by: FAMILY MEDICINE

## 2025-03-21 RX ORDER — TIZANIDINE 4 MG/1
4 TABLET ORAL EVERY 8 HOURS PRN
Qty: 30 TABLET | Refills: 0 | Status: SHIPPED | OUTPATIENT
Start: 2025-03-21

## 2025-03-21 NOTE — PROGRESS NOTES
Subjective:       Patient ID: Nichelle Barone is a 70 y.o. female.    Chief Complaint: HTN follow up    Here today for her annual exam.  Here today with her     Immunizations: Due Prevnar 20, Shingrix, RSV and Tdap  Last Lab work: 10/24  Colon Ca screening:  Colonoscopy 2025  Breast Ca Screening: [facility] 2015 - [place] (Jodi Waddell)  Cervical Ca Screening: no longer recommended    HTN:  She was now on Diovan (in place of Entresto), Toprol and Spironolactone  CAD:  non-obstructive.  Started on Statin in hosptial.  She has stopped it.  Chronic Diarrhea:  She was on Welchol.    Dementia with psychosis:  Was hospitalized in Aug.  On Zypexa and doing very well.  COPD:  She is on stiolto.  Restarted smoking      Review of Systems   Constitutional:  Negative for activity change, appetite change, fatigue and fever.   Respiratory:  Negative for cough, shortness of breath and wheezing.    Cardiovascular:  Negative for chest pain and palpitations.   Gastrointestinal:  Negative for abdominal pain, constipation, diarrhea and nausea.   Musculoskeletal:  Positive for neck pain.   Skin:  Negative for pallor and rash.   Neurological:  Negative for dizziness, syncope, light-headedness and headaches.   Psychiatric/Behavioral:  The patient is not nervous/anxious.        Objective:      Vitals:    03/21/25 1316   BP: 120/70      Physical Exam  Constitutional:       General: She is not in acute distress.  Cardiovascular:      Rate and Rhythm: Normal rate and regular rhythm.      Heart sounds: Normal heart sounds. No murmur heard.  Pulmonary:      Effort: Pulmonary effort is normal. No respiratory distress.      Breath sounds: Normal breath sounds. No wheezing, rhonchi or rales.   Skin:     Findings: Lesion (AK on R 5th digit) present.   Neurological:      General: No focal deficit present.      Mental Status: She is alert.   Psychiatric:         Mood and Affect: Mood normal.         Behavior: Behavior normal.         Thought Content:  Thought content normal.       Destruction, Premalignant Lesion    Date/Time: 3/21/2025 1:40 PM    Performed by: Edmundo Amanda MD  Authorized by: Edmundo Amanda MD    Consent Done?:  Yes (Verbal)    Indications:  Actinic keratosis    Location(s):    Upper Extremity:  Hand        Detail:  right small finger  Number of lesions:  1  Destruction method:  Cryotherapy  Bleeding:  None   Patient tolerated the procedure well with no immediate complications.     Results for orders placed or performed in visit on 02/26/25   Stool culture    Collection Time: 02/26/25  1:11 PM    Specimen: Stool   Result Value Ref Range    Stool Culture       No Salmonella,Shigella,Vibrio,Campylobacter,Yersinia isolated.   Clostridium difficile EIA    Collection Time: 02/26/25  1:11 PM    Specimen: Stool   Result Value Ref Range    C. diff Antigen Negative Negative    C difficile Toxins A+B, EIA Negative Negative   E. coli 0157 antigen    Collection Time: 02/26/25  1:11 PM    Specimen: Stool   Result Value Ref Range    Shiga Toxin 1 E.coli Negative     Shiga Toxin 2 E.coli Negative    Stool Exam-Ova,Cysts,Parasites    Collection Time: 02/26/25  1:11 PM   Result Value Ref Range    Stool Exam-Ova,Cysts,Parasites FINAL 03/06/2025 1024    Giardia / Cryptosporidum, EIA    Collection Time: 02/26/25  1:11 PM   Result Value Ref Range    Giardia Antigen - EIA Negative Negative    Cryptosporidium Antigen Negative Negative   WBC, Stool    Collection Time: 02/26/25  1:11 PM   Result Value Ref Range    Stool WBC No neutrophils seen No neutrophils seen      Assessment:       1. Annual physical exam    2. Nonobstructive atherosclerosis of coronary artery    3. Displacement of cervical intervertebral disc    4. Psychosis, unspecified psychosis type    5. Chronic obstructive pulmonary disease, unspecified COPD type    6. Mild dementia with anxiety, unspecified dementia type    7. Stenosis of cervical spine with myelopathy        Plan:       Annual  physical exam  Continue to work on dietary improvements (decrease overall calorie intake, decrease sugar and carb intake, decrease animal protein intake)  Continue to exercise at least 30-40 minutes, 3 times per week  Immunizations were discussed and she has deferred all vaccines at this time  Preventative exams were discussed and up to date   Nonobstructive atherosclerosis of coronary artery  Continue current medication.  I have asked her to restart her statin and she wishes to stay off it.  Displacement of cervical intervertebral disc  -     tiZANidine (ZANAFLEX) 4 MG tablet; Take 1 tablet (4 mg total) by mouth every 8 (eight) hours as needed (pain/spasms).  Dispense: 30 tablet; Refill: 0    Psychosis, unspecified psychosis type  Continue Zyprexa  Chronic obstructive pulmonary disease, unspecified COPD type  Continue inhlaer  Mild dementia with anxiety, unspecified dementia type    Stenosis of cervical spine with myelopathy    Other orders  -     Destruction, Premalignant Lesion    Visit today included increased complexity associated with the care of the episodic problem CAD addressed and managing the longitudinal care of the patient due to the serious and/or complex managed problem(s) as above.       Medication List with Changes/Refills   Current Medications    ALBUTEROL-IPRATROPIUM (DUO-NEB) 2.5 MG-0.5 MG/3 ML NEBULIZER SOLUTION    USE 1 VIAL BY NEBULIZATION EVERY 8 HOURS AS NEEDED FOR WHEEZING OR FOR SHORTNESS OF BREATH    ASPIRIN (ECOTRIN) 81 MG EC TABLET    Take 1 tablet (81 mg total) by mouth once daily.    CHOL/GL/SER/RNA/PHEN/PRG/ (SHARPER FOCUS ORAL)    Take 1 capsule by mouth 2 (two) times a day.    DOCUSATE SODIUM (COLACE) 100 MG CAPSULE    Take 100 mg by mouth 3 (three) times daily as needed for Constipation.    ENZYMES,DIGESTIVE (DIGESTIVE ENZYMES ORAL)    Take 1 tablet by mouth 2 (two) times a day.    ERGOCALCIFEROL (ERGOCALCIFEROL) 50,000 UNIT CAP    Take 50,000 Units by mouth every Tuesday.     FUROSEMIDE (LASIX) 20 MG TABLET    Take 1 tablet (20 mg total) by mouth daily as needed.    LYCOPENE ORAL    Take 1 capsule by mouth once daily.    MAGNESIUM GLUCONATE 27.5 MG MAGNE- SIUM (500 MG) TAB    Take 1 tablet by mouth every evening.    METOPROLOL SUCCINATE (TOPROL-XL) 25 MG 24 HR TABLET    Take 1 tablet (25 mg total) by mouth 2 (two) times daily.    NICOTINE (NICODERM CQ) 14 MG/24 HR    Place 1 patch onto the skin once daily.    NON FORMULARY MEDICATION    Take 30 mg by mouth once daily. **LITHIUM ORATATE 30MG**    OLANZAPINE (ZYPREXA) 5 MG TABLET    Take 1 tablet (5 mg total) by mouth every evening.    POLYETHYLENE GLYCOL (GLYCOLAX) 17 GRAM PWPK    Take 8.5 g by mouth once daily.    SPIRONOLACTONE 5 MG/ML SUSP    Take 2.5 mLs (12.5 mg total) by mouth once daily.    TIOTROPIUM-OLODATEROL (STIOLTO RESPIMAT) 2.5-2.5 MCG/ACTUATION MIST    Inhale 2 puffs into the lungs once daily.    VALSARTAN (DIOVAN) 40 MG TABLET    Take 1 tablet (40 mg total) by mouth once daily.   Changed and/or Refilled Medications    Modified Medication Previous Medication    TIZANIDINE (ZANAFLEX) 4 MG TABLET tiZANidine (ZANAFLEX) 4 MG tablet       Take 1 tablet (4 mg total) by mouth every 8 (eight) hours as needed (pain/spasms).    Take 1 tablet (4 mg total) by mouth every 8 (eight) hours. for 20 days   Discontinued Medications    ATORVASTATIN (LIPITOR) 40 MG TABLET    Take 1 tablet (40 mg total) by mouth every evening.    METHOCARBAMOL (ROBAXIN) 750 MG TAB    TAKE 1 TABLET BY MOUTH 3 TIMES A DAY    NON FORMULARY MEDICATION    Take 1 capsule by mouth once daily. **MYRICA SUPPLEMENT**    PANTOPRAZOLE (PROTONIX) 40 MG TABLET    Take 1 tablet (40 mg total) by mouth 2 (two) times daily.

## 2025-03-28 DIAGNOSIS — M50.20 DISPLACEMENT OF CERVICAL INTERVERTEBRAL DISC: ICD-10-CM

## 2025-03-28 RX ORDER — TIZANIDINE 4 MG/1
TABLET ORAL
Qty: 30 TABLET | Refills: 0 | Status: SHIPPED | OUTPATIENT
Start: 2025-03-28

## 2025-03-28 NOTE — TELEPHONE ENCOUNTER
No care due was identified.  [facility] Embedded Care Due Messages. Reference number: [id number].   3/28/2025 3:47:21 AM CDT

## 2025-04-09 DIAGNOSIS — M50.20 DISPLACEMENT OF CERVICAL INTERVERTEBRAL DISC: ICD-10-CM

## 2025-04-09 RX ORDER — TIZANIDINE 4 MG/1
TABLET ORAL
Qty: 30 TABLET | Refills: 3 | Status: SHIPPED | OUTPATIENT
Start: 2025-04-09

## 2025-04-09 NOTE — TELEPHONE ENCOUNTER
No care due was identified.  [facility] Embedded Care Due Messages. Reference number: [id number].   4/09/2025 7:07:42 AM CDT

## 2025-07-02 DIAGNOSIS — Z78.0 MENOPAUSE: ICD-10-CM

## 2025-08-25 ENCOUNTER — HOSPITAL ENCOUNTER (OUTPATIENT)
Dept: RADIOLOGY | Facility: HOSPITAL | Age: 70
Discharge: HOME | End: 2025-08-25
Attending: FAMILY MEDICINE
Payer: MEDICARE

## 2025-08-25 DIAGNOSIS — Z78.0 MENOPAUSE: ICD-10-CM

## 2025-08-25 PROCEDURE — 77092 TBS I&R FX RSK QHP: CPT | Mod: ,,, | Performed by: RADIOLOGY

## 2025-08-25 PROCEDURE — 77091 TBS TECHL CALCULATION ONLY: CPT | Mod: PO

## 2025-08-25 PROCEDURE — 77080 DXA BONE DENSITY AXIAL: CPT | Mod: 26,,, | Performed by: RADIOLOGY

## 2025-08-28 ENCOUNTER — HOSPITAL ENCOUNTER (OUTPATIENT)
Dept: RADIOLOGY | Facility: HOSPITAL | Age: 70
Discharge: HOME | End: 2025-08-28
Attending: ANESTHESIOLOGY
Payer: MEDICARE

## 2025-08-28 ENCOUNTER — OFFICE VISIT (OUTPATIENT)
Dept: PAIN MEDICINE | Facility: CLINIC | Age: 70
End: 2025-08-28
Payer: MEDICARE

## 2025-08-28 VITALS
SYSTOLIC BLOOD PRESSURE: 139 MMHG | HEART RATE: 77 BPM | HEIGHT: 63 IN | BODY MASS INDEX: 22.61 KG/M2 | WEIGHT: 127.63 LBS | DIASTOLIC BLOOD PRESSURE: 74 MMHG

## 2025-08-28 DIAGNOSIS — M54.9 DORSALGIA: ICD-10-CM

## 2025-08-28 DIAGNOSIS — M54.9 DORSALGIA: Primary | ICD-10-CM

## 2025-08-28 DIAGNOSIS — M54.9 DORSALGIA, UNSPECIFIED: ICD-10-CM

## 2025-08-28 PROCEDURE — 99999 PR PBB SHADOW E&M-EST. PATIENT-LVL IV: CPT | Mod: PBBFAC,,, | Performed by: ANESTHESIOLOGY

## 2025-08-28 PROCEDURE — 72114 X-RAY EXAM L-S SPINE BENDING: CPT | Mod: 26,,, | Performed by: RADIOLOGY

## 2025-08-28 PROCEDURE — 99214 OFFICE O/P EST MOD 30 MIN: CPT | Mod: PBBFAC,25,PO | Performed by: ANESTHESIOLOGY

## 2025-08-28 PROCEDURE — 72114 X-RAY EXAM L-S SPINE BENDING: CPT | Mod: TC,PO

## 2025-08-28 PROCEDURE — 99214 OFFICE O/P EST MOD 30 MIN: CPT | Mod: S$PBB,,, | Performed by: ANESTHESIOLOGY

## 2025-08-28 RX ORDER — HYDROCODONE BITARTRATE AND ACETAMINOPHEN 5; 325 MG/1; MG/1
1 TABLET ORAL EVERY 12 HOURS PRN
Qty: 20 TABLET | Refills: 0 | Status: SHIPPED | OUTPATIENT
Start: 2025-08-28

## (undated) DEVICE — GOWN SURG. AERO CHROME XXL

## (undated) DEVICE — UNDERGLOVE BIOGEL MICRO BLUE SZ 8.5

## (undated) DEVICE — BLADE ELECTRODE EDGE INSULATED 4  INCH

## (undated) DEVICE — SUTURE VICRYL 1 OS6 27 J-535H

## (undated) DEVICE — BLADE ELECTRODE EDGE INSULATED 6 INCH

## (undated) DEVICE — LOTION PREP REMOVER 5OZ IODOPHOR

## (undated) DEVICE — GLOVE 7.5 PROTEXIS PI MICRO

## (undated) DEVICE — TAPE SURGICAL W2INXL10YD SOFT CLOTH WATER RESISTANT MEDIPORE

## (undated) DEVICE — GLOVE BIOGEL PI   GOLD SIZE 8

## (undated) DEVICE — COVER MAYO STAND XLG 89602

## (undated) DEVICE — SYS LABEL CORRECT MED

## (undated) DEVICE — BLADE CLIPPER NEURO SURGICAL

## (undated) DEVICE — SYRINGE HYPODERMC LL 22G 1.5 ECLIPSE 30

## (undated) DEVICE — DRAIN ROUND 100CC 1/8 0073310

## (undated) DEVICE — TIP BOVIE ENT 2.75      E1455

## (undated) DEVICE — SOL SOD CHLORIDE 0.9% 10ML

## (undated) DEVICE — WAX BONE 2.5G (YELLOW)

## (undated) DEVICE — NEEDLE SAFETY ECLIPSE 18G 1-1/2

## (undated) DEVICE — NDL TUOHY EPIDURAL 20G X 3.5

## (undated) DEVICE — NEEDLE SPINAL 18GA 3 1/2 333350

## (undated) DEVICE — POUCH INSTRUMENT 1018

## (undated) DEVICE — HEMOSTATIC FLOSEAL 5ML

## (undated) DEVICE — TAPE POROUS CURITY STANDARD 4 INCH

## (undated) DEVICE — SOLUTION DURAPREP 8630

## (undated) DEVICE — YANKAUER SUCTION REGULAR FLEXIBLE W/O VENT 18FR.

## (undated) DEVICE — SYR GLASS 5CC LUER LOK

## (undated) DEVICE — COVER XRAY 24.5X24IN

## (undated) DEVICE — COUNTER NEEDLE POP 1840 BLK 31142311

## (undated) DEVICE — Device

## (undated) DEVICE — CHLORAPREP 10.5 ML APPLICATOR

## (undated) DEVICE — TUBING SUCTION 12' ST2003

## (undated) DEVICE — DRAPE C-ARM (FITS NEW C-ARM) 07-CA108

## (undated) DEVICE — APPLICATOR CHLORAPREP CLR 10.5

## (undated) DEVICE — TRAY NERVE BLOCK

## (undated) DEVICE — DRAPE IOBAN 23X17 6650EZ

## (undated) DEVICE — BULB DRAIN 100CC 70740

## (undated) DEVICE — CORD FORCRP 12FT E0512

## (undated) DEVICE — ALCOHOL ISOPROPYL 70% 4OZ

## (undated) DEVICE — PAD BOVIE ADULT

## (undated) DEVICE — PROTECTOR ULNAR NERVE PURPLE FOAM HOOK LOOP STRAP ANATOMICAL

## (undated) DEVICE — ADHESIVE MASTISOL VIAL 0523-48

## (undated) DEVICE — COLLAR MIAMI J STOUT

## (undated) DEVICE — DRAPE MICROSCOPE FOR ZEISS 54 X 150

## (undated) DEVICE — SPONGE SURGIFOAM 8X12.5 1974

## (undated) DEVICE — DRAPE PEDIATRIC LAPAROTOMY 72X124

## (undated) DEVICE — TUBING MINIBORE EXTENSION

## (undated) DEVICE — STRIP SKIN CLOSURE W1XL5IN WHITE NONWOVEN BACKING ADHESIVE

## (undated) DEVICE — PATTIE 1/2X1/2 801400

## (undated) DEVICE — DRAPE 3/4

## (undated) DEVICE — TRAY ACF SLIDELL MEMORIAL HOSPITAL

## (undated) DEVICE — TAPE SILK DURAPORE 3 1538-3

## (undated) DEVICE — COLLECTOR SURGICAL FOR AUTOLOGOUS BONE DUST BONE

## (undated) DEVICE — CONTAINER SPECIMEN 4 OZ STERILE 1053

## (undated) DEVICE — GLOVE SENSICARE PI GRN 7.5

## (undated) DEVICE — GOWN SURGICAL BASIC LG

## (undated) DEVICE — SPONGE KERLIX SUPER   NON25854

## (undated) DEVICE — SUTURE VICRYL 3-0 SH 27 VCP416H

## (undated) DEVICE — STAPLER SKIN NON ROTATE PXW35